# Patient Record
Sex: MALE | Race: WHITE | NOT HISPANIC OR LATINO | Employment: OTHER | URBAN - METROPOLITAN AREA
[De-identification: names, ages, dates, MRNs, and addresses within clinical notes are randomized per-mention and may not be internally consistent; named-entity substitution may affect disease eponyms.]

---

## 2018-05-08 ENCOUNTER — OFFICE VISIT CONVERTED (OUTPATIENT)
Dept: CARDIOLOGY | Facility: CLINIC | Age: 73
End: 2018-05-08
Attending: NURSE PRACTITIONER

## 2018-11-16 ENCOUNTER — OFFICE VISIT CONVERTED (OUTPATIENT)
Dept: CARDIOLOGY | Facility: CLINIC | Age: 73
End: 2018-11-16
Attending: NURSE PRACTITIONER

## 2019-03-06 ENCOUNTER — HOSPITAL ENCOUNTER (OUTPATIENT)
Dept: GENERAL RADIOLOGY | Facility: HOSPITAL | Age: 74
Discharge: HOME | End: 2019-03-06

## 2019-04-03 ENCOUNTER — HOSPITAL ENCOUNTER (OUTPATIENT)
Dept: GENERAL RADIOLOGY | Facility: HOSPITAL | Age: 74
Discharge: HOME | End: 2019-04-03

## 2019-04-04 ENCOUNTER — HOSPITAL ENCOUNTER (OUTPATIENT)
Dept: GENERAL RADIOLOGY | Facility: HOSPITAL | Age: 74
Discharge: HOME | End: 2019-04-04
Attending: PHYSICIAN ASSISTANT

## 2019-05-02 ENCOUNTER — HOSPITAL ENCOUNTER (OUTPATIENT)
Dept: CT IMAGING | Facility: HOSPITAL | Age: 74
Discharge: HOME | End: 2019-05-02

## 2019-05-02 LAB
CREAT BLD-MCNC: 1.3 MG/DL (ref 0.6–1.4)
GFR SERPLBLD BASED ON 1.73 SQ M-ARVRAT: 54 ML/MIN/{1.73_M2}

## 2019-05-21 ENCOUNTER — OFFICE VISIT CONVERTED (OUTPATIENT)
Dept: CARDIOLOGY | Facility: CLINIC | Age: 74
End: 2019-05-21
Attending: NURSE PRACTITIONER

## 2019-05-21 ENCOUNTER — CONVERSION ENCOUNTER (OUTPATIENT)
Dept: CARDIOLOGY | Facility: CLINIC | Age: 74
End: 2019-05-21
Attending: INTERNAL MEDICINE

## 2019-06-07 ENCOUNTER — OFFICE VISIT CONVERTED (OUTPATIENT)
Dept: PULMONOLOGY | Facility: CLINIC | Age: 74
End: 2019-06-07
Attending: INTERNAL MEDICINE

## 2019-07-17 ENCOUNTER — HOSPITAL ENCOUNTER (OUTPATIENT)
Dept: GENERAL RADIOLOGY | Facility: HOSPITAL | Age: 74
Discharge: HOME | End: 2019-07-17
Attending: OTOLARYNGOLOGY

## 2019-07-17 LAB
ANION GAP SERPL CALC-SCNC: 17 MMOL/L (ref 8–19)
APTT BLD: 24.8 S (ref 22.2–34.2)
BASOPHILS # BLD AUTO: 0.03 10*3/UL (ref 0–0.2)
BASOPHILS NFR BLD AUTO: 0.3 % (ref 0–3)
BUN SERPL-MCNC: 17 MG/DL (ref 5–25)
BUN/CREAT SERPL: 13 {RATIO} (ref 6–20)
CALCIUM SERPL-MCNC: 9.3 MG/DL (ref 8.7–10.4)
CHLORIDE SERPL-SCNC: 101 MMOL/L (ref 99–111)
CONV ABS IMM GRAN: 0.04 10*3/UL (ref 0–0.2)
CONV CO2: 26 MMOL/L (ref 22–32)
CONV IMMATURE GRAN: 0.4 % (ref 0–1.8)
CREAT UR-MCNC: 1.35 MG/DL (ref 0.7–1.2)
DEPRECATED RDW RBC AUTO: 43.3 FL (ref 35.1–43.9)
EOSINOPHIL # BLD AUTO: 0.6 10*3/UL (ref 0–0.7)
EOSINOPHIL # BLD AUTO: 6.3 % (ref 0–7)
ERYTHROCYTE [DISTWIDTH] IN BLOOD BY AUTOMATED COUNT: 12.9 % (ref 11.6–14.4)
GFR SERPLBLD BASED ON 1.73 SQ M-ARVRAT: 52 ML/MIN/{1.73_M2}
GLUCOSE SERPL-MCNC: 127 MG/DL (ref 70–99)
HBA1C MFR BLD: 13.7 G/DL (ref 14–18)
HCT VFR BLD AUTO: 42.1 % (ref 42–52)
INR PPP: 0.9 (ref 2–3)
LYMPHOCYTES # BLD AUTO: 2.09 10*3/UL (ref 1–5)
MCH RBC QN AUTO: 29.8 PG (ref 27–31)
MCHC RBC AUTO-ENTMCNC: 32.5 G/DL (ref 33–37)
MCV RBC AUTO: 91.5 FL (ref 80–96)
MONOCYTES # BLD AUTO: 0.66 10*3/UL (ref 0.2–1.2)
MONOCYTES NFR BLD AUTO: 7 % (ref 3–10)
NEUTROPHILS # BLD AUTO: 6.03 10*3/UL (ref 2–8)
NEUTROPHILS NFR BLD AUTO: 63.9 % (ref 30–85)
NRBC CBCN: 0 % (ref 0–0.7)
OSMOLALITY SERPL CALC.SUM OF ELEC: 293 MOSM/KG (ref 273–304)
PLATELET # BLD AUTO: 180 10*3/UL (ref 130–400)
PMV BLD AUTO: 10.8 FL (ref 9.4–12.4)
POTASSIUM SERPL-SCNC: 4.1 MMOL/L (ref 3.5–5.3)
PROTHROMBIN TIME: 9.6 S (ref 9.4–12)
RBC # BLD AUTO: 4.6 10*6/UL (ref 4.7–6.1)
SODIUM SERPL-SCNC: 140 MMOL/L (ref 135–147)
VARIANT LYMPHS NFR BLD MANUAL: 22.1 % (ref 20–45)
WBC # BLD AUTO: 9.45 10*3/UL (ref 4.8–10.8)

## 2019-07-23 ENCOUNTER — HOSPITAL ENCOUNTER (OUTPATIENT)
Dept: PERIOP | Facility: HOSPITAL | Age: 74
Setting detail: HOSPITAL OUTPATIENT SURGERY
Discharge: HOME | End: 2019-07-23
Attending: OTOLARYNGOLOGY

## 2019-07-23 LAB — GLUCOSE BLD-MCNC: 182 MG/DL (ref 70–99)

## 2019-08-07 ENCOUNTER — OFFICE VISIT CONVERTED (OUTPATIENT)
Dept: PULMONOLOGY | Facility: CLINIC | Age: 74
End: 2019-08-07
Attending: INTERNAL MEDICINE

## 2019-09-04 ENCOUNTER — HOSPITAL ENCOUNTER (OUTPATIENT)
Dept: SLEEP MEDICINE | Facility: HOSPITAL | Age: 74
Discharge: HOME | End: 2019-09-04
Attending: PSYCHIATRY & NEUROLOGY

## 2019-09-06 ENCOUNTER — OFFICE VISIT CONVERTED (OUTPATIENT)
Dept: CARDIOLOGY | Facility: CLINIC | Age: 74
End: 2019-09-06
Attending: INTERNAL MEDICINE

## 2019-12-03 ENCOUNTER — OFFICE VISIT CONVERTED (OUTPATIENT)
Dept: CARDIOLOGY | Facility: CLINIC | Age: 74
End: 2019-12-03
Attending: NURSE PRACTITIONER

## 2020-01-20 ENCOUNTER — HOSPITAL ENCOUNTER (OUTPATIENT)
Dept: GENERAL RADIOLOGY | Facility: HOSPITAL | Age: 75
Discharge: HOME | End: 2020-01-20
Attending: GENERAL PRACTICE

## 2020-02-12 ENCOUNTER — OFFICE VISIT CONVERTED (OUTPATIENT)
Dept: PULMONOLOGY | Facility: CLINIC | Age: 75
End: 2020-02-12
Attending: INTERNAL MEDICINE

## 2020-04-20 ENCOUNTER — HOSPITAL ENCOUNTER (OUTPATIENT)
Dept: OTHER | Facility: HOSPITAL | Age: 75
Discharge: HOME | End: 2020-04-20
Attending: OTOLARYNGOLOGY

## 2020-04-22 LAB
EYE OR EAR CULTURE: ABNORMAL
EYE OR EAR CULTURE: ABNORMAL

## 2020-06-12 ENCOUNTER — OFFICE VISIT CONVERTED (OUTPATIENT)
Dept: CARDIOLOGY | Facility: CLINIC | Age: 75
End: 2020-06-12
Attending: NURSE PRACTITIONER

## 2020-09-09 ENCOUNTER — HOSPITAL ENCOUNTER (OUTPATIENT)
Dept: SLEEP MEDICINE | Facility: HOSPITAL | Age: 75
Discharge: HOME | End: 2020-09-09
Attending: PSYCHIATRY & NEUROLOGY

## 2020-09-23 ENCOUNTER — HOSPITAL ENCOUNTER (OUTPATIENT)
Dept: ULTRASOUND IMAGING | Facility: HOSPITAL | Age: 75
Discharge: HOME | End: 2020-09-23
Attending: NURSE PRACTITIONER

## 2020-10-29 ENCOUNTER — HOSPITAL ENCOUNTER (OUTPATIENT)
Dept: URGENT CARE | Facility: CLINIC | Age: 75
Discharge: HOME | End: 2020-10-29

## 2021-01-07 ENCOUNTER — HOSPITAL ENCOUNTER (OUTPATIENT)
Dept: OTHER | Facility: HOSPITAL | Age: 76
Discharge: HOME | End: 2021-01-07
Attending: OTOLARYNGOLOGY

## 2021-01-11 LAB
CASPOFUNGIN SUSC ISLT: 0.25
EYE OR EAR CULTURE: ABNORMAL
FLUCONAZOLE SUSC ISLT: <=0.5
MICAFUNGIN [SUSCEPTIBILITY]: 0.5
VORICONAZOLE SUSC ISLT: <=0.12

## 2021-02-25 ENCOUNTER — HOSPITAL ENCOUNTER (OUTPATIENT)
Dept: GENERAL RADIOLOGY | Facility: HOSPITAL | Age: 76
Discharge: HOME | End: 2021-02-25
Attending: OTOLARYNGOLOGY

## 2021-02-25 ENCOUNTER — HOSPITAL ENCOUNTER (OUTPATIENT)
Dept: LAB | Facility: HOSPITAL | Age: 76
Discharge: HOME | End: 2021-02-25
Attending: NURSE PRACTITIONER

## 2021-02-25 LAB
ANION GAP SERPL CALC-SCNC: 15 MMOL/L (ref 8–19)
APPEARANCE UR: CLEAR
BASOPHILS # BLD AUTO: 0.03 10*3/UL (ref 0–0.2)
BASOPHILS NFR BLD AUTO: 0.3 % (ref 0–3)
BILIRUB UR QL: NEGATIVE
BUN SERPL-MCNC: 15 MG/DL (ref 5–25)
BUN/CREAT SERPL: 12 {RATIO} (ref 6–20)
CALCIUM SERPL-MCNC: 9.6 MG/DL (ref 8.7–10.4)
CHLORIDE SERPL-SCNC: 102 MMOL/L (ref 99–111)
COLOR UR: YELLOW
CONV ABS IMM GRAN: 0.07 10*3/UL (ref 0–0.2)
CONV CO2: 25 MMOL/L (ref 22–32)
CONV COLLECTION SOURCE (UA): NORMAL
CONV CREATININE URINE, RANDOM: 39.7 MG/DL (ref 10–300)
CONV IMMATURE GRAN: 0.6 % (ref 0–1.8)
CONV UROBILINOGEN IN URINE BY AUTOMATED TEST STRIP: 0.2 {EHRLICHU}/DL (ref 0.1–1)
CREAT UR-MCNC: 1.23 MG/DL (ref 0.7–1.2)
DEPRECATED RDW RBC AUTO: 42.9 FL (ref 35.1–43.9)
EOSINOPHIL # BLD AUTO: 0.38 10*3/UL (ref 0–0.7)
EOSINOPHIL # BLD AUTO: 3.4 % (ref 0–7)
ERYTHROCYTE [DISTWIDTH] IN BLOOD BY AUTOMATED COUNT: 13.3 % (ref 11.6–14.4)
GFR SERPLBLD BASED ON 1.73 SQ M-ARVRAT: 58 ML/MIN/{1.73_M2}
GLUCOSE SERPL-MCNC: 132 MG/DL (ref 70–99)
GLUCOSE UR QL: NEGATIVE MG/DL
HCT VFR BLD AUTO: 47 % (ref 42–52)
HGB BLD-MCNC: 15.4 G/DL (ref 14–18)
HGB UR QL STRIP: NEGATIVE
KETONES UR QL STRIP: NEGATIVE MG/DL
LEUKOCYTE ESTERASE UR QL STRIP: NEGATIVE
LYMPHOCYTES # BLD AUTO: 2.37 10*3/UL (ref 1–5)
LYMPHOCYTES NFR BLD AUTO: 21.3 % (ref 20–45)
MCH RBC QN AUTO: 29.3 PG (ref 27–31)
MCHC RBC AUTO-ENTMCNC: 32.8 G/DL (ref 33–37)
MCV RBC AUTO: 89.4 FL (ref 80–96)
MONOCYTES # BLD AUTO: 0.55 10*3/UL (ref 0.2–1.2)
MONOCYTES NFR BLD AUTO: 4.9 % (ref 3–10)
NEUTROPHILS # BLD AUTO: 7.72 10*3/UL (ref 2–8)
NEUTROPHILS NFR BLD AUTO: 69.5 % (ref 30–85)
NITRITE UR QL STRIP: NEGATIVE
NRBC CBCN: 0 % (ref 0–0.7)
OSMOLALITY SERPL CALC.SUM OF ELEC: 289 MOSM/KG (ref 273–304)
PH UR STRIP.AUTO: 7 [PH] (ref 5–8)
PHOSPHATE SERPL-MCNC: 2.8 MG/DL (ref 2.4–4.5)
PLATELET # BLD AUTO: 201 10*3/UL (ref 130–400)
PMV BLD AUTO: 11 FL (ref 9.4–12.4)
POTASSIUM SERPL-SCNC: 4.1 MMOL/L (ref 3.5–5.3)
PROT UR QL: NEGATIVE MG/DL
PROT UR-MCNC: <4 MG/DL
PTH-INTACT SERPL-MCNC: 41.1 PG/ML (ref 11.1–79.5)
RBC # BLD AUTO: 5.26 10*6/UL (ref 4.7–6.1)
SODIUM SERPL-SCNC: 138 MMOL/L (ref 135–147)
SP GR UR: 1.01 (ref 1–1.03)
WBC # BLD AUTO: 11.12 10*3/UL (ref 4.8–10.8)

## 2021-03-10 ENCOUNTER — OFFICE VISIT CONVERTED (OUTPATIENT)
Dept: PULMONOLOGY | Facility: CLINIC | Age: 76
End: 2021-03-10
Attending: SPECIALIST

## 2021-03-19 ENCOUNTER — OFFICE VISIT CONVERTED (OUTPATIENT)
Dept: CARDIOLOGY | Facility: CLINIC | Age: 76
End: 2021-03-19
Attending: NURSE PRACTITIONER

## 2021-03-19 ENCOUNTER — CONVERSION ENCOUNTER (OUTPATIENT)
Dept: CARDIOLOGY | Facility: CLINIC | Age: 76
End: 2021-03-19

## 2021-04-30 ENCOUNTER — HOSPITAL ENCOUNTER (OUTPATIENT)
Dept: GENERAL RADIOLOGY | Facility: HOSPITAL | Age: 76
Discharge: HOME | End: 2021-04-30
Attending: NURSE PRACTITIONER

## 2021-05-04 ENCOUNTER — OFFICE VISIT CONVERTED (OUTPATIENT)
Dept: PULMONOLOGY | Facility: CLINIC | Age: 76
End: 2021-05-04
Attending: INTERNAL MEDICINE

## 2021-05-13 NOTE — PROGRESS NOTES
Progress Note      Patient Name: Abdi Gomez   Patient ID: [id number]   Sex: Male   YOB: 1945    Primary Care Provider: Sarika BRAUN   Referring Provider: Sarika BRAUN    Visit Date: June 12, 2020    Provider: APARNA Decker   Location: [facility]   Location Address: [address] [place] [address], [facility]   JONNA Oliveira  [id number]   Location Phone: [phone]          Chief Complaint  · Follow-up on his coronary artery disease       History Of Present Illness  REFERRING PROVIDER: Jaimie BRAUN   Abdi Gomez is a 75 year old male who denies any chest pain or pressure. No palpitations, shortness of breath, swelling, dizziness, syncope, PND or orthopnea. Cardiac-wise he is feeling very well. He has lost about 3 pounds since his last visit. He admits he does not get any regular exercise, but he said his cholesterols are much better and diabetes is controlled. Blood pressure when he checked it this morning was 136/76, and he said it is in a good range at home, but he did not bring the readings.   PAST MEDICAL HISTORY: 1) Coronary artery disease, status post angioplasty and stent placement in 1999 (details not available); 2) Hypertension; 3) Hyperlipidemia; 4) Diabetes mellitus; 5) Chronic sinus problems.   FAMILY HISTORY: Negative for diabetes mellitus, hypertension, or heart disease.   PSYCHOSOCIAL HISTORY: Denies mood changes or depression. Denies alcohol use. Previously smoked, but quit.   CURRENT MEDICATIONS: include Famotidine 20 mg 2 tablets daily; Metformin 500 mg b.i.d.; Cetirizine 18 mg daily; Singulair 10 mg daily; Flonase; Trelegy; Ventolin; Vitamin B12 1000 mg daily; Vitamin D 2000 units daily; Olmesartan/HCTZ 40/12.5 mg daily; ASA 81 mg daily; Rosuvastatin 10 mg every other day; Ezetimibe 10 mg every other day. The dosage and frequency of the medications were reviewed with the patient.       Review of Systems  · Cardiovascular  o Denies  o :  "palpitations (fast, fluttering, or skipping beats), swelling (feet, ankles, hands), shortness of breath while walking or lying flat, chest pain or angina pectoris   · Respiratory  o Denies  o : chronic or frequent cough, asthma or wheezing      Vitals  Date Time BP Position Site L\R Cuff Size HR RR TEMP (F) WT  HT  BMI kg/m2 BSA m2 O2 Sat HC       06/12/2020 09:32 /86 Sitting    56 - R   271lbs 0oz 5'  11\" 37.8 2.48     06/12/2020 09:32 /88 Sitting                     Physical Examination  · Constitutional  o Appearance  o : Awake, alert, in no acute distress, obese male.  · Eyes  o Conjunctivae  o : Conjunctivae normal.  · Ears, Nose, Mouth and Throat  o Oral Cavity  o :   § Oral Mucosa  § : Normal.  · Neck  o Jugular Veins  o : No JVD. Good carotid upstroke. No bruits noted.  · Respiratory  o Respiratory  o : Good respiratory effort. Clear to percussion and auscultation.  · Cardiovascular  o Heart  o : PMI is normal. S1, S2 normal. No S3. No S4. Negative systolic/diastolic murmur.  o Peripheral Vascular System  o :   § Extremities  § : Good femoral and pedal pulses. No pedal edema.  · Gastrointestinal  o Abdominal Examination  o : Soft with central obesity. Nontender. No tenderness or masses felt. No hepatosplenomegaly. Abdominal aorta is not palpable. Bowel sounds heard in all four quadrants.     We are trying to obtain labs from his PCP.           Assessment     1.  Hypertension, uncertain control.  2.  Coronary artery disease with previous angioplasty and stent, without angina.  3.  Hyperlipidemia, unknown control.  4.  Myalgias from statins stable.       Plan     1.  Instructed him to do a blood pressure log, and we will adjust hypertensive medications if needed.  2.  Will follow up in 6 months with labs from his PCP.    Brook zavala/ren           This note was transcribed by Josephine Huitron.  ren/lucas  The above service was transcribed by Josephine Huitron, and I attest to the accuracy of the note.  " JF                 Electronically Signed by: Josephine Huitron-, -Author on June 17, 2020 10:07:11 AM  Electronically Co-signed by: APARNA Decker -Reviewer on June 23, 2020 01:38:47 PM

## 2021-05-14 VITALS
HEIGHT: 71 IN | SYSTOLIC BLOOD PRESSURE: 144 MMHG | WEIGHT: 284 LBS | BODY MASS INDEX: 39.76 KG/M2 | DIASTOLIC BLOOD PRESSURE: 82 MMHG | HEART RATE: 98 BPM

## 2021-05-14 NOTE — PROGRESS NOTES
Progress Note      Patient Name: Abdi Gomez   Patient ID: [id number]   Sex: Male   YOB: 1945    Primary Care Provider: Sarika BRAUN   Referring Provider: Sarika BRAUN    Visit Date: March 19, 2021    Provider: APARNA Decker   Location: [facility]   Location Address: [address] [place] [address], [address]   JONNA Oliveira  [id number]   Location Phone: [phone]          Chief Complaint     Evaluate his coronary artery disease.       History Of Present Illness  REFERRING PROVIDER: Sarika BRAUN   Abdi Gomez is a 75 year old /White male who denies any chest pain or pressure. No palpitations, shortness of breath, swelling, dizziness, syncope, PND, or orthopnea. Cardiac-wise, he is feeling very well. Home blood pressures range between 123/70 to 132/70 when he takes his medications. It is higher in the morning with the highest being 158/90 and that is before his meds. He was on a beta-blocker at one time, but he continues to have allergy shots on a weekly basis and they do not want him on a beta-blocker. He has not had a COVID vaccine yet and contemplated not getting it, but now, he has changed his mind. He is looking for a place to put his name on the list. He has gained 13 pounds since his last visit, and he admits his diet has been poor over the wintertime.   PAST MEDICAL HISTORY: 1) Coronary artery disease, status post angioplasty and stent placement in 1999 (details not available); 2) Hypertension; 3) Hyperlipidemia; 4) Diabetes mellitus; 5) Chronic sinus problems.   PSYCHOSOCIAL HISTORY: Previously smoked, but quit. Denies alcohol use.   CURRENT MEDICATIONS: Glipizide 2.5 mg b.i.d.; Trelegy daily; rosuvastatin 10 mg every other day; olmesartan 40 mg daily; hydrochlorothiazide 12.5 mg every other day; aspirin 81 mg daily; ezetimibe 10 mg every other day; fluticasone nasal spray; montelukast 10 mg daily; vitamin B12 daily; vitamin D daily; famotidine 20 mg  "b.i.d.      ALLERGIES: Keflex.       Review of Systems  · Cardiovascular  o Denies  o : palpitations (fast, fluttering, or skipping beats), swelling (feet, ankles, hands), shortness of breath while walking or lying flat, chest pain or angina pectoris   · Respiratory  o Denies  o : chronic or frequent cough      Vitals  Date Time BP Position Site L\R Cuff Size HR RR TEMP (F) WT  HT  BMI kg/m2 BSA m2 O2 Sat FR L/min FiO2 HC       03/19/2021 10:10 /82 Sitting    98 - R   284lbs 0oz 5'  11\" 39.61 2.54       03/19/2021 10:10 /80 Sitting    96 - R                   Physical Examination  · Constitutional  o Appearance  o : Awake, alert, obese male, in no acute distress.  · Eyes  o Conjunctivae  o : Conjunctivae normal.  · Ears, Nose, Mouth and Throat  o Oral Cavity  o :   § Oral Mucosa  § : Normal.  · Neck  o Jugular Veins  o : No JVD. Good carotid upstroke. No bruits noted.  · Respiratory  o Respiratory  o : Good respiratory effort. Clear to percussion and auscultation.  · Cardiovascular  o Heart  o : PMI is normal. S1, S2 normal. No S3. No S4. Negative systolic/diastolic murmur.  o Peripheral Vascular System  o :   § Extremities  § : Good femoral and pedal pulses. No pedal edema.  · Gastrointestinal  o Abdominal Examination  o : Soft with central obesity. Nontender. No tenderness or masses felt. No hepatosplenomegaly. Abdominal aorta is not palpable. Bowel sounds heard in all four quadrants.   · Labs  o Labs  o : [facility], total cholesterol 147, triglycerides 104, HDL 37, LDL up to 89.          Assessment     1.  Hypertension with degree of white coat syndrome and good readings at home.  2.  Hyperlipidemia with LDL worsening.  3.  Coronary artery disease with previous angioplasty and stent without angina.  4.  Diabetes mellitus.       Plan     1.  Counseled regarding weight loss and low-fat, low-cholesterol diet to help with his LDL.  If does not improve,       we will consider adding " Nexletol to his rosuvastatin and Zetia.  2.  We are trying to investigate what happened to the olmesartan, more for secondary prevention but also        better blood pressure control, and restart it in the future.    3.  Ideally, we would like to have a beta-blocker in view of his slightly fast heart rates, but cannot at this time        because of the allergy shots.    4.  Continue hydrochlorothiazide in view of hypertension.  5.  Follow up in 6-8 months with labs or earlier if needed.    ADDENDUM:  Patient is taking olmesartan 40 mg once a day.        APARNA Russo  JF:vm             Electronically Signed by: Radha Garcia-, Other -Author on March 31, 2021 02:19:30 PM  Electronically Co-signed by: APARNA Decker -Reviewer on April 1, 2021 09:08:42 AM

## 2021-05-15 VITALS
WEIGHT: 270 LBS | BODY MASS INDEX: 37.8 KG/M2 | DIASTOLIC BLOOD PRESSURE: 96 MMHG | SYSTOLIC BLOOD PRESSURE: 150 MMHG | HEART RATE: 46 BPM | HEIGHT: 71 IN

## 2021-05-15 VITALS
WEIGHT: 279 LBS | HEART RATE: 50 BPM | HEIGHT: 71 IN | BODY MASS INDEX: 39.06 KG/M2 | DIASTOLIC BLOOD PRESSURE: 88 MMHG | SYSTOLIC BLOOD PRESSURE: 152 MMHG

## 2021-05-15 VITALS
HEART RATE: 56 BPM | WEIGHT: 271 LBS | BODY MASS INDEX: 37.94 KG/M2 | SYSTOLIC BLOOD PRESSURE: 154 MMHG | DIASTOLIC BLOOD PRESSURE: 86 MMHG | HEIGHT: 71 IN

## 2021-05-15 VITALS
WEIGHT: 273 LBS | BODY MASS INDEX: 38.22 KG/M2 | HEIGHT: 71 IN | DIASTOLIC BLOOD PRESSURE: 80 MMHG | HEART RATE: 80 BPM | SYSTOLIC BLOOD PRESSURE: 150 MMHG

## 2021-05-16 VITALS
BODY MASS INDEX: 38.22 KG/M2 | WEIGHT: 273 LBS | SYSTOLIC BLOOD PRESSURE: 132 MMHG | DIASTOLIC BLOOD PRESSURE: 90 MMHG | HEART RATE: 80 BPM | HEIGHT: 71 IN

## 2021-05-16 VITALS
DIASTOLIC BLOOD PRESSURE: 96 MMHG | HEART RATE: 88 BPM | SYSTOLIC BLOOD PRESSURE: 160 MMHG | HEIGHT: 71 IN | WEIGHT: 274 LBS | BODY MASS INDEX: 38.36 KG/M2

## 2021-05-19 ENCOUNTER — HOSPITAL ENCOUNTER (OUTPATIENT)
Dept: SLEEP MEDICINE | Facility: HOSPITAL | Age: 76
Discharge: HOME | End: 2021-05-19
Attending: PSYCHIATRY & NEUROLOGY

## 2021-05-23 ENCOUNTER — TRANSCRIBE ORDERS (OUTPATIENT)
Dept: PULMONOLOGY | Facility: CLINIC | Age: 76
End: 2021-05-23

## 2021-05-23 DIAGNOSIS — J44.9 CHRONIC OBSTRUCTIVE PULMONARY DISEASE, UNSPECIFIED COPD TYPE (HCC): Primary | ICD-10-CM

## 2021-05-28 VITALS
WEIGHT: 265 LBS | SYSTOLIC BLOOD PRESSURE: 146 MMHG | DIASTOLIC BLOOD PRESSURE: 88 MMHG | BODY MASS INDEX: 38.84 KG/M2 | HEIGHT: 71 IN | WEIGHT: 273.12 LBS | DIASTOLIC BLOOD PRESSURE: 86 MMHG | RESPIRATION RATE: 14 BRPM | TEMPERATURE: 98.7 F | BODY MASS INDEX: 38.24 KG/M2 | HEIGHT: 71 IN | OXYGEN SATURATION: 98 % | WEIGHT: 277.43 LBS | TEMPERATURE: 98.7 F | SYSTOLIC BLOOD PRESSURE: 151 MMHG | OXYGEN SATURATION: 95 % | TEMPERATURE: 98.3 F | HEART RATE: 74 BPM | HEART RATE: 95 BPM | BODY MASS INDEX: 37.1 KG/M2 | HEART RATE: 94 BPM | DIASTOLIC BLOOD PRESSURE: 82 MMHG | RESPIRATION RATE: 14 BRPM | SYSTOLIC BLOOD PRESSURE: 176 MMHG | OXYGEN SATURATION: 95 % | RESPIRATION RATE: 14 BRPM | HEIGHT: 71 IN

## 2021-05-28 VITALS
WEIGHT: 280 LBS | TEMPERATURE: 98.6 F | HEIGHT: 71 IN | DIASTOLIC BLOOD PRESSURE: 81 MMHG | BODY MASS INDEX: 39.2 KG/M2 | SYSTOLIC BLOOD PRESSURE: 156 MMHG | HEART RATE: 92 BPM | RESPIRATION RATE: 16 BRPM | OXYGEN SATURATION: 96 %

## 2021-05-28 VITALS
HEIGHT: 71 IN | WEIGHT: 280 LBS | SYSTOLIC BLOOD PRESSURE: 150 MMHG | OXYGEN SATURATION: 99 % | TEMPERATURE: 98 F | BODY MASS INDEX: 39.2 KG/M2 | RESPIRATION RATE: 18 BRPM | HEART RATE: 85 BPM | DIASTOLIC BLOOD PRESSURE: 83 MMHG

## 2021-05-28 NOTE — PROGRESS NOTES
Patient: IMANI ARELLANO     Acct: [id number]     Report: #[id number]  UNIT #: [id number]     : 1945    Encounter Date:2019  PRIMARY CARE: JOSE CHRIS  ***Signed***  --------------------------------------------------------------------------------------------------------------------  Chief Complaint      Encounter Date      2019            Primary Care Provider            larry norman            Referring Provider            larry norman            Patient Complaint      Patient is complaining of      abnormal chest xray            VITALS      Height 5 ft 11 in / 180.34 cm      Weight 277 lbs 7 oz / 125.311473 kg      BSA 2.42 m2      BMI 38.7 kg/m2      Temperature 98.7 F / 37.06 C - Oral      Pulse 74      Respirations 14      Blood Pressure 176/82 Sitting, Right Arm      Pulse Oximetry 95%, roomair      Initial Exhaled Nitrous Oxide      Date:  2019      Exhaled Nitrous Oxide Results:  49            HPI      The patient is a very pleasant 74 year old obese  male former cigarette    smoker who quit in  for 45 pack years here for progressive dyspnea.  She has    had progressive shortness of breath for the last three years. It is worse with     exertion and relieved with rest.  He also had pneumonia in early  which made    things worse.  He has now recovered back to his baseline. He can walk about 600     feet or up one flight of steps before having to stop because of shortness of     breath. It is moderate in severity, worse with exertion and relieved with rest.     He has an intermittent cough that is dry with no sputum production or     hemoptysis. He does have some wheezing. All of his symptoms are worse when his     allergies are exacerbated.  He takes Singulair and Zyrtec and still has     occasional nasal congestion, but denies any itchy-scratchy throat, watery eyes     or nasal congestion.  He has seen Dr. Hopper who is planning on doing sinus surgery     as well as allergy testing. He did have a chest CT done recently showing severe     apical emphysema with some bibasilar pulmonary fibrosis.  PFTs were done showing    a mixed obstructive and restrictive lung defect consistent with moderate COPD,     obesity and pulmonary fibrosis.  He did previously work for [employer] and was     exposed to occupational inhalants, chemicals and fumes for about 25 years. She     is able to perform her ADLs without difficulty.  Denies any swollen glands or     lymph nodes of the head and neck.  Denies any nausea, vomiting, fevers, chills,     headaches, chest pain or hemoptysis.            I have personally reviewed the review of systems, past family, social, surgical     and medical histories and I agree with the findings.            ROS      Constitutional:  Denies: Fatigue, Fever, Weight gain, Weight loss, Chills,     Insomnia, Other      Respiratory/Breathing:  Complains of: Shortness of air, Wheezing, Cough; Denies:    Hemoptysis, Pleuritic pain, Other      Endocrine:  Denies: Polydipsia, Polyuria, Heat/cold intolerance, Diabetes, Other      Eyes:  Denies: Blurred vision, Vision Changes, Other      Ears, nose, mouth, throat:  Denies: Mouth lesions, Thrush, Throat pain,     Hoarseness, Allergies/Hay Fever, Post Nasal Drip, Headaches, Recent Head Injury,    Nose Bleeding, Neck Stiffness, Thyroid Mass, Hearing Loss, Ear Fullness, Dry     Mouth, Nasal or Sinus Pain, Dry Lips, Nasal discharge, Nasal congestion, Other      Cardiovascular:  Denies: Palpitations, Syncope, Claudication, Chest Pain, Wake     up Gasping for air, Leg Swelling, Irregular Heart Rate, Cyanosis, Dyspnea on     Exertion, Other      Gastrointestinal:  Denies: Nausea, Constipation, Diarrhea, Abdominal pain,     Vomiting, Difficulty Swallowing, Reflux/Heartburn, Dysphagia, Jaundice,     Bloating, Melena, Bloody stools, Other      Genitourinary:  Denies: Urinary frequency, Incontinence, Hematuria, Urgency,      Nocturia, Dysuria, Testicular problems, Other      Musculoskeletal:  Denies: Joint Pain, Joint Stiffness, Joint Swelling, Myalgias,    Other      Hematologic/lymphatic:  DENIES: Lymphadenopathy, Bruising, Bleeding tendencies,     Other      Neurological:  Denies: Headache, Numbness, Weakness, Seizures, Other      Psychiatric:  Denies: Anxiety, Appropriate Effect, Depression, Other      Sleep:  No: Excessive daytime sleep, Morning Headache?, Snoring, Insomnia?, Stop    breathing at sleep?, Other      Integumentary:  Denies: Rash, Dry skin, Skin Warm to Touch, Other      Immunologic/Allergic:  Denies: Latex allergy, Seasonal allergies, Asthma,     Urticaria, Eczema, Other      Immunization status:  No: Up to date            FAMILY/SOCIAL/MEDICAL HX      Surgical History:  Yes: Bowel Surgery (COLONOSCOPY WITH POLYP REMOVAL), Head     Surgery (BILAT CATARACT WITH IMPLANTS), Oral Surgery (T   (RIGHT TKR)      Diabetes - Family Hx:  Grandparent      Cancer/Type - Family Hx:  Father (lung)      Is Father Still Living?:  No      Is Mother Still Living?:  No       Family History:  Yes      Social History:  No Tobacco Use, No Alcohol Use, No Recreational Drug use      Smoking status:  Former smoker (1.5-2 ppd x 30 y quit 1999)      Medical History:  Yes: Arthritis (GENERALIZED ), Diabetes (TYPE II NIDDM,     DOESN'T CHECK OFTEN), Hemorrhoids/Rectal Prob (ACID REFLUX ), High Blood     Pressure (MED CONTROLLED), Shortness Of Breath (SLEEP APNEA ); No: Blood     Disease, Chemotherapy/Cancer, Deafness or Ringing Ears, Miscellaneous     Medical/oth      Psychiatric History      none            PREVENTION      Hx Influenza Vaccination:  Yes      Date Influenza Vaccine Given:  Nov 1, 2018      2 or More Falls Past Year?:  No      Fall Past Year with Injury?:  No      Hx Pneumococcal Vaccination:  No      Encouraged to follow-up with:  PCP regarding preventative exams.      Chart initiated by      katerin/ ma           "  ALLERGIES/MEDICATIONS      Allergies:        Coded Allergies:             CEFAZOLIN (Verified  Allergy, Unknown, HIVES, 6/7/19)           CEPHALEXIN (Verified  Allergy, Unknown, HIVES, 6/7/19)           CEPHALOSPORINS (Verified  Allergy, Unknown, HIVES, 6/7/19)           HYDROCODONE (Verified  Adverse Reaction, Unknown, \"MAKES ME MEAN\", 6/7/19)      Medications    Last Reconciled on 6/7/19 09:02 by VINICIO FIERRO MD      Fluticasone/Umeclidin/Vilanter (Trelegy Ellipta 100-62.5-25) 1 Each Blst.w.dev      1 PUFF INH RTQDAY, #1 INH         Prov: VINICIO FIERRO         6/7/19       Ezetimide (Zetia) 10 Mg Tablet      10 MG PO QDAY, #30 TAB 0 Refills         Reported         6/7/19       Metoprolol Tartrate (Metoprolol Tartrate*) 25 Mg Tablet      12.5 MG PO QDAY, #30 TAB 0 Refills         Reported         6/7/19       Aspirin EC (Aspirin EC) 81 Mg Tablet.dr      81 MG PO QDAY, #30 TAB 0 Refills         Reported         6/7/19       MDI-Albuterol (Ventolin HFA) 18 Gm Hfa.aer.ad      2 PUFFS INH Q4H PRN for SHORTNESS OF BREATH, #1 INH 0 Refills         Reported         6/7/19       Raudel-Fluticasone (Fluticasone 50 mcg) 16 Gm Spray.susp      2 PUFFS NARE EACH QDAY, #1 BOTTLE 0 Refills         Reported         6/7/19       Montelukast Sodium (Singulair*) 10 Mg Tablet      10 MG PO QDAY, #30 TAB 0 Refills         Reported         6/7/19       Cetirizine Hcl (ZyrTEC) 10 Mg Tablet      10 MG PO QDAY, #30 TAB 0 Refills         Reported         6/7/19       Atorvastatin (Atorvastatin) 20 Mg Tablet      20 MG PO HS, #30 TAB 0 Refills         Reported         1/23/17       Metformin Hcl (Metformin Hcl) 500 Mg Tablet      1000 MG PO BID, #120 TAB 0 Refills         Reported         1/23/17       Olmesartan/Hydrochlorothiazide (Benicar Hct 40/12.5 Mg) 1 Tab Tablet      1 TAB PO QDAY         Reported         11/13/13       Famotidine (Famotidine) 20 Mg Tablet      20 MG PO BID, TAB         Reported         11/13/13      Current " Medications      Current Medications Reviewed 19            EXAM      Vital Signs Reviewed.      General:  WDWN, Alert, NAD.      HEENT: PERRL, EOMI.  OP, nares clear, no sinus tenderness.      Neck: Supple, no JVD, no thyromegaly.      Lymph: No axillary, cervical, supraclavicular lymphadenopathy noted bilaterally.      Chest: Good aeration, clear to auscultation bilaterally, tympanic to percussion     bilaterally, no work of breathing noted.      CV: RRR, no MGR, pulses 2+, equal.        Abd: Obese, soft, NT, ND, +BS, no HSM.      EXT: No clubbing, no cyanosis, no edema, no joint tenderness.        Neuro:  A  Skin: No rashes or lesions.      Vtials      Vitals:             Height 5 ft 11 in / 180.34 cm           Weight 277 lbs 7 oz / 125.402549 kg           BSA 2.42 m2           BMI 38.7 kg/m2           Temperature 98.7 F / 37.06 C - Oral           Pulse 74           Respirations 14           Blood Pressure 176/82 Sitting, Right Arm           Pulse Oximetry 95%, roomair            REVIEW      Results Reviewed      PCCS Results Reviewed?:  Yes Prev Lab Results, Yes Prev Radiology Results, Yes     [facility]      Lab Results      I reviewed office notes from referring provider.  I reviewed chest CT and chest     x-rays, both from 2019. I personally reviewed PFTs from 2019.      Radiographic Results               [facility]                PACS RADIOLOGY REPORT            Patient: IMANI ARELLANO   Acct: #[id number]   Report: #[id number]            UNIT #: [id number]    DOS: 19 1008      INSURANCE:MEDICARE PART A   LOCATION:CT     : 1945            PROVIDERS      ADMITTING:     ATTENDING: CHRISTINA CARDONA      FAMILY:  CHRISTINA CARDONA   ORDERING:  CHRISTINA CARDONA         OTHER:    DICTATING:  Van Kaur MD            REQ #:[id number]   EXAM:CHW - CT CHEST with CONTRAST      REASON FOR EXAM:  R91.8      REASON FOR VISIT:   R91.8            *******Signed******         PROCEDURE:   CT CHEST W/ CONTRAST             COMPARISON:   [facility], CR, CHEST PA/AP   4/03/2019, 10:21.        [facility], CT, CHEST W/ CONTRAST, 4/06/2015, 7:39.             INDICATIONS:   abn chest xray, hx recent pneumonia             TECHNIQUE:   After obtaining the patient's consent, CT images were obtained with    non-ionic       intravenous contrast material.               PROTOCOL:     Standard imaging protocol performed                RADIATION:     DLP: 846mGy*cm          Automated exposure control was utilized to minimize radiation dose.       CONTRAST:   100cc Isovue 370 I.V.      LABS:     eGFR: 54ml/min/1.73m2             FINDINGS:          There is extensive bilateral predominantly upper lobe centrilobular emphysema.     There is mild       prominence of the axial pulmonary interstitium in the upper lobes.  There is     also mild prominence       of the subpleural pulmonary interstitium in the left upper lobe.  More     inferiorly there is       prominence of the axial and subpleural pulmonary interstitium in the lung bases     with minimal       subpleural honeycombing posteriorly in the right lower lobe and to a greater     degree posteriorly in       the region of the superior segment of the left lower lobe.  There is bilateral     hilar adenopathy as       well as mediastinal adenopathy.  There is a lymph node just to the right of the     lower portion of       the trachea measuring 2.4 x 1.5 cm in dimension which is unchanged from the     previous study.  There       are multiple bilateral hilar lymph nodes some of which measure up to 2.7 cm in     greatest dimension       and are not felt to be significantly changed.  Other smaller subcentimeter     mediastinal lymph nodes       are stable.  There are no suspicious appearing pulmonary parenchymal nodules.      There are       hypertrophic degenerative  changes of the thoracic spine.             CONCLUSION:                1. Overall, no significant change when compared to the previous CT scan of     2015.             2. Findings consistent with predominantly upper lobe emphysema.  Changes     predominantly in the lung       bases are more suggestive of chronic pulmonary fibrosis.             3. Stable mediastinal and hilar adenopathy most likely reactive or post reactive    in nature given       the appearance of the lungs.                GIANNI PADILLA MD             Electronically Signed and Approved By: GIANNI PADILLA MD on 2019 at 12:25                           Until signed, this is an unconfirmed preliminary report that may contain      errors and is subject to change.                                              SCHJ1:      D:19 1225      PFT Results      Patient: ABDI ARELLANOA   Acct: #[id number]   Report: #[id number]            UNIT #: [id number]    DOS:       LOCATION:CT     : 1945            PROVIDERS      ADMITTING:     FAMILY:  CHRISTINA CARDONA         OTHER:       DICTATING:  VINICIO FIERRO MD            REASON FOR VISIT:  R91.8            *******Signed******                                    [facility]                          Health Information Management Services                            [place], [place]  [address]               __________________________________________________________________________             Patient Name:                   Attending Physician:      Abdi Arellano             Patient Visit # MR #            Admit Date  Disch Date     Location      [id number]    [id number]      2019                 CT- -             Date of Birth      1945      __________________________________________________________________________      08 - DIAGNOSTIC REPORT             PULMONARY FUNCTION TEST             DATE OF STUDY:      2019.              SPIROMETRY:      Moderate airflow obstruction present.      Post bronchodilator FEV1 is 2.24 L, 68% predicted.      No bronchodilator response present.      Flow volume loop shows mixed defect.             LUNG VOLUMES:      Moderate restrictive lung defect is present.      Total lung capacity is 4.96 L, 68% predicted.             DIFFUSION CAPACITY:      Diffusion capacity is moderately reduced at 49% predicted.             OVERALL IMPRESSION:      1.  Moderate mixed obstructive and restrictive lung defect without          bronchodilator response present.      2.  Diffusion capacity is moderately reduced.      3.  Could be seen in interstitial lung disease overlapping with emphysema or          potentially pulmonary edema.  Please clinically correlate.             To be electronically signed in [email or web address]      [id number] VINICIO FIERRO M.D.             AM:rt      D:  05/06/2019 00:10      T:  05/06/2019 08:52      #[id number]             Until signed, this is an unconfirmed preliminary report that may contain      errors and is subject to change.                   Until signed, this is an unconfirmed preliminary report that may contain      errors and is subject to change.                     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            FILOMENA (obstructive sleep apnea) - G47.33            Notes      New Medications      * CETIRIZINE HCL (ZyrTEC) 10 MG TABLET: 10 MG PO QDAY #30      * MONTELUKAST SODIUM (Singulair*) 10 MG TABLET: 10 MG PO QDAY #30      *  Raudel-Fluticasone (Fluticasone 50 mcg) 16 GM SPRAY.SUSP: 2 PUFFS NARE EACH QDAY       #1      * MDI-Albuterol (Ventolin HFA) 18 GM HFA.AER.AD: 2 PUFFS INH Q4H PRN SHORTNESS       OF BREATH #1      * Aspirin EC 81 MG TABLET.DR: 81 MG PO QDAY #30      * Metoprolol Tartrate 25 MG TABLET: 12.5 MG PO QDAY #30      * Ezetimide (Zetia) 10 MG TABLET: 10 MG PO QDAY #30      * Fluticasone/Umeclidin/Vilanter (Trelegy Ellipta 100-62.5-25) 1 EACH       BLST.W.DEV: 1 PUFF INH RTQDAY #1      Discontinued Medications      * oxyCODONE-Acetaminophen 5-325 MG 1 EACH TABLET: 1-2 TAB PO Q4-6H PRN PAIN #30      * oxyCODONE-Acetaminophen 5-325 Mg 1 EACH TABLET: 1-2 TAB PO Q4-6H PRN PAIN #60      * Azithromycin Z-Tommy (Zithromax Z-Tommy) 250 MG TABLET: 250 MG PO ASDIR #1         Instructions: Take 500 mg (two tablets) by mouth the first day, then 250 mg        (one tablet) daily until all taken.      * Promethazine Hcl (Phenergan*) 25 MG SUPP.RECT: 25 MG RECTAL Q4-6H PRN NAUSEA       AND/OR VOMITING #2      New Diagnostics      * Alpha 1 Antitrypsin , Month         Dx: Emphysema lung - J43.9      New Office Procedures      * Prevnar-13, As Soon As Possible         Pneumoc 13-Alejandra Conj-Dip CRm/Pf (Prevnar 13 Syringe) 0.5 ML SYRINGE: 0.5        MILLILITER INTRAMUSCULARLY Qty 1 SYRINGE         Dx: Emphysema lung - J43.9      IMPRESSION:      1.  Dyspnea on exertion.      2. Cough.      3.  Wheeze.      4.  Seasonal allergies, well-controlled.      5. Chronic sinusitis.      6. Obesity, BMI 30.7.      7. Moderate COPD with FEV1 68% of predicted.      8.  Restrictive lung defect secondary to chronic pulmonary fibrosis and obesity.      9. Chronic pulmonary fibrosis.               PLAN:      1.  I performed exhale nitric oxide testing in the office today.  Level was 49     indicative of a moderate to high degree of eosinophilic airway inflammation.      2. Continue Trelegy as patient had significant benefit with this.  Trelegy one     puff daily  recommended. Inhaler education provided today.      3.  PFTs and chest CT are consistent with combined pulmonary fibrosis with     emphysema.  Luckily the patient has stopped smoking.      4. We will do six minute walk test in the office today.      5. We will check alpha 1 antitrypsin level and genotype.      6. Not a candidate for lung cancer screening.      7. Continue Singulair and Zyrtec and agree with allergy testing from Dr. Hopper.       8.  Agree with evaluation for possible sinus surgery by Dr. Hopper.      9. Four minutes were spent today doing diet and exercise counseling.      Recommended 30 minutes of daily exercise as well as an 1800 calorie a day low     fat diet.      10. He is up-to-date with flu vaccine.  We will give Prevnar today and Pneumovax    one year.      11. Follow up with me in 2-3 months.            Patient Education      ACO BMI High above 25:  Counseling Given, Encouraged weight loss, Encourage     dietary changes      Patient Education Provided:  COPD, How to use an Inhaler                 Disclaimer: Converted document may not contain table formatting or lab diagrams. Please see [employer] for the authenticated document.

## 2021-05-28 NOTE — PROGRESS NOTES
Patient: IMANI ARELLANO     Acct: [id number]     Report: #[id number]  UNIT #: [id number]     : 1945    Encounter Date:03/10/2021  PRIMARY CARE: JOSE CHRIS  ***Signed***  --------------------------------------------------------------------------------------------------------------------  Chief Complaint      Encounter Date      Mar 10, 2021            Primary Care Provider      JOSE CHRIS            Referring Provider      JOSE CHRIS            Patient Complaint      Patient is complaining of      PT here for 1 year F/U Med refill. Hypertension, COPD, Emphysema, Pulmonary     Fibrosis, Dyspnea, FILOMENA            VITALS      Height 5 ft 11 in / 180.34 cm      Weight 280 lbs  / 127.777896 kg      BSA 2.43 m2      BMI 39.1 kg/m2      Temperature 98.6 F / 37 C - Temporal      Pulse 92      Respirations 16      Blood Pressure 156/81 Sitting, Left Arm      Pulse Oximetry 96%, room air      Initial Exhaled Nitrous Oxide      Date:  2019            HPI      PT here for 1 year F/U Med refill. Hypertension, COPD, Emphysema, Pulmonary     Fibrosis, Dyspnea, FILOMENA       CT scan of the chest report:      FINDINGS:          There is extensive bilateral predominantly upper lobe centrilobular emphysema.     There is mild       prominence of the axial pulmonary interstitium in the upper lobes.  There is     also mild prominence       of the subpleural pulmonary interstitium in the left upper lobe.  More     inferiorly there is       prominence of the axial and subpleural pulmonary interstitium in the lung bases     with minimal       subpleural honeycombing posteriorly in the right lower lobe and to a greater     degree posteriorly in       the region of the superior segment of the left lower lobe.  There is bilateral     hilar adenopathy as       well as mediastinal adenopathy.  There is a lymph node just to the right of the     lower portion of       the trachea measuring 2.4 x 1.5 cm in dimension  which is unchanged from the     previous study.  There       are multiple bilateral hilar lymph nodes some of which measure up to 2.7 cm in     greatest dimension       and are not felt to be significantly changed.  Other smaller subcentimeter     mediastinal lymph nodes       are stable.  There are no suspicious appearing pulmonary parenchymal nodules.      There are       hypertrophic degenerative changes of the thoracic spine.       CONCLUSION:          1. Overall, no significant change when compared to the previous CT scan of     4/6/2015.       2. Findings consistent with predominantly upper lobe emphysema.  Changes     predominantly in the lung       bases are more suggestive of chronic pulmonary fibrosis.       3. Stable mediastinal and hilar adenopathy most likely reactive or post reactive    in nature given the appearance of the lungs.                 GIANNI PADILLA MD             Electronically Signed and Approved By: GIANNI PADILLA MD on 5/02/2019 at 12:25             ========    ================================================================================      ======================              PULMONARY FUNCTION TEST             DATE OF STUDY:      05/02/2019.             SPIROMETRY:      Moderate airflow obstruction present.      Post bronchodilator FEV1 is 2.24 L, 68% predicted.      No bronchodilator response present.      Flow volume loop shows mixed defect.             LUNG VOLUMES:      Moderate restrictive lung defect is present.      Total lung capacity is 4.96 L, 68% predicted.             DIFFUSION CAPACITY:      Diffusion capacity is moderately reduced at 49% predicted.             OVERALL IMPRESSION:      1.  Moderate mixed obstructive and restrictive lung defect without          bronchodilator response present.      2.  Diffusion capacity is moderately reduced.      3.  Could be seen in interstitial lung disease overlapping with emphysema or          potentially pulmonary edema.   Please clinically correlate.       To be electronically signed in [facility]      [id number] VINICIO FIERRO M.D.       AM:rt      D:  05/06/2019 00:10      T:  05/06/2019 08:52      Patient is very happy with the present medical therapy and not having any     problem. He had a Ramiro done in 2019 which is 36. He is on the CPAP since 2013     and follows with the appropriate physician. He is a retired      no pets. And a former smoker. Denies any fever chills sweating or any exposure     to anybody who is sick around him. No recent travel history.      Patient present medical management, family history, review of the systems and     social history is reviewed and as documented            ROS      Constitutional:  Denies: Fatigue, Fever, Weight gain, Weight loss, Chills,     Insomnia, Other      Respiratory/Breathing:  Denies: Shortness of air, Wheezing, Cough, Hemoptysis,     Pleuritic pain, Other      Endocrine:  Denies: Polydipsia, Polyuria, Heat/cold intolerance, Diabetes, Other      Eyes:  Denies: Blurred vision, Vision Changes, Other      Ears, nose, mouth, throat:  Denies: Congestion, Dysphagia, Hearing Changes, Nose    Bleeding, Nasal Discharge, Throat pain, Tinnitus, Other      Cardiovascular:  Denies: Chest Pain, Exertional dyspnea, Peripheral Edema,     Palpitations, Syncope, Wake up Gasping for air, Orthopnea, Tachycardia, Other      Gastrointestinal:  Denies: Abdominal pain/cramping, Bloody stools, Constipation,    Diarrhea, Melena, Nausea, Vomiting, Other      Genitourinary:  Denies: Dysuria, Urinary frequency, Incontinence, Hematuria,     Urgency, Other      Musculoskeletal:  Denies: Joint Pain, Joint Stiffness, Joint Swelling, Myalgias,    Other      Hematologic/lymphatic:  DENIES: Lymphadenopathy, Bruising, Bleeding tendencies,     Other      Neurologic:  Denies: Headache, Numbness, Weakness, Seizures, Other      Psychiatric:  Denies: Anxiety, Appropriate Effect, Depression, Other      Sleep:  " No: Excessive daytime sleep, Morning Headache?, Snoring, Insomnia?, Stop    breathing at sleep?, Other      Integumentary:  Denies: Rash, Dry skin, Skin Warm to Touch, Other            FAMILY/SOCIAL/MEDICAL HX      Surgical History:  Yes: Bowel Surgery (COLONOSCOPY WITH POLYP REMOVAL), Head     Surgery (BILAT CATARACT WITH IMPLANTS/ sinus), Oral Surgery (T   Surgery (RIGHT TKR)      Diabetes - Family Hx:  Grandparent      Cancer/Type - Family Hx:  Father      Smoking status:  Former smoker (1.5-2ppd x30 yrs quit 1999)      Medical History:  Yes: Arthritis (GENERALIZED ), Diabetes (TYPE II NIDDM, BG     RUNS AROUND 130-136 IN AM), Hemorrhoids/Rectal Prob (ACID REFLUX ), High Blood     Pressure (ON MEDICATIONS ); No: Blood Disease, Chemotherapy/Cancer, Deafness or     Ringing Ears, Shortness Of Breath, Sinus Trouble, Miscellaneous Medical/oth      Psychiatric History      None            PREVENTION      Hx Influenza Vaccination:  No      Date Influenza Vaccine Given:  Nov 1, 2019      Influenza Vaccine Declined:  No      2 or More Falls in Past Year?:  No      Fall Past Year with Injury?:  No      Hx Pneumococcal Vaccination:  Yes      Encouraged to follow-up with:  PCP regarding preventative exams.      Chart initiated by      Rosa Mora MA            ALLERGIES/MEDICATIONS      Allergies:        Coded Allergies:             CEFAZOLIN (Verified  Allergy, Unknown, HIVES, 3/10/21)           CEPHALEXIN (Verified  Allergy, Unknown, HIVES, 3/10/21)           CEPHALOSPORINS (Verified  Allergy, Unknown, HIVES, 3/10/21)           HYDROCODONE (Verified  Adverse Reaction, Unknown, \"MAKES ME MEAN\", 3/10/21)                  PT REPORTED HE CAN TAKE TYLENOL      Medications    Last Reconciled on 2/12/20 09:36 by VINICIO FIERRO MD      Aspirin Chew (Aspirin Baby) 81 Mg Tab.chew      81 MG PO QDAY, #30 TAB.CHEW 0 Refills         Reported         3/10/21       Hctz (hydroCHLOROthiazide) 12.5 Mg Capsule      12.5 MG PO QDAY, " #30 CAP 0 Refills         Reported         3/10/21       MDI-Albuterol (Ventolin HFA) 18 Gm Hfa.aer.ad      2 PUFFS INH Q6H PRN for SHORTNESS OF BREATH, #1 MDI 0 Refills         Reported         3/10/21       Famotidine (Famotidine) 20 Mg Tablet      20 MG PO BID for 30 Days, #60 TAB 0 Refills         Reported         3/10/21       Montelukast Sodium (Montelukast*) 10 Mg Tablet      10 MG PO QDAY, TAB         Reported         3/10/21       Ezetimibe (Zetia) 10 Mg Tablet      10 MG PO QDAY, #30 TAB 0 Refills         Reported         3/10/21       Raudel-Fluticasone (Fluticasone 50 mcg) 16 Gm Spray.susp      1 PUFFS NARE EACH QDAY, #16 GM 0 Refills         Reported         3/10/21       Fluticasone/Umeclidin/Vilanter (Trelegy Ellipta 100-62.5-25) 1 Each Blst.w.dev      1 PUFF INH QDAY for 30 Days, #1 INH         Prov: VINICIO FIERRO         3/3/21       Ergocalciferol (Vitamin D2) 50,000 Unit Capsule      85701 UNITS PO Q7D, #4 CAP         Reported         10/29/20       Cyanocobalamin (Vitamin B-12*) 500 Mcg Tablet      500 MCG PO QDAY, TAB         Reported         10/29/20       glipiZIDE (glipiZIDE) 10 Mg Tablet      10 MG PO QDAY, #30 TAB 0 Refills         Reported         10/29/20       Rosuvastatin Calcium (Crestor*) 10 Mg Tablet      10 MG PO HS, #30 TAB 0 Refills         Reported         7/18/19       Olmesartan/Hydrochlorothiazide (Benicar Hct 40/12.5 Mg) 1 Tab Tablet      1 TAB PO QDAY         Reported         11/13/13      Current Medications      Current Medications Reviewed 3/10/21            EXAM      Vitals      Vitals:             Height 5 ft 11 in / 180.34 cm           Weight 280 lbs  / 127.361640 kg           BSA 2.43 m2           BMI 39.1 kg/m2           Temperature 98.6 F / 37 C - Temporal           Pulse 92           Respirations 16           Blood Pressure 156/81 Sitting, Left Arm           Pulse Oximetry 96%, room air            Constitutional      General appearance:  Comfortable            Eyes       Conjunctiva:  Bilateral: Normal            Neck      JVP:  Normal      Trachea:  Midline            Respiratory      Respiratory effort:  normal      Auscultation:  Bilateral: Normal            Cardiovascular      Rhythm:  regular      Heart sounds:  NORMAL: S1, S2            Gastrointestinal      Abdomen description:  Normal      Hepatosplenomegaly:  none      Mass:  None            Skin      General color:  Normal            Lymphatic      Bilateral: None            Psychiatric      Normal      Alert and Oriented x3            Assessment      Notes      Present medical history, medication is reviewed and discussed with the patient.     Patient also had a history of obstructive sleep apnea and uses CPAP.      Will renew the present medical therapy.      We will get the spirometry and follow-up in 1 year with the primary team     physician Dr. Jensen.      New Medications      * Raudel-Fluticasone (Fluticasone 50 mcg) 16 GM SPRAY.SUSP: 1 PUFFS NARE EACH QDAY       #16      * Ezetimibe (Zetia) 10 MG TABLET: 10 MG PO QDAY #30      * MONTELUKAST SODIUM (Montelukast*) 10 MG TABLET: 10 MG PO QDAY      * Famotidine 20 MG TABLET: 20 MG PO BID 30 Days #60      * MDI-Albuterol (Ventolin HFA) 18 GM HFA.AER.AD: 2 PUFFS INH Q6H PRN SHORTNESS       OF BREATH #1      * HCTZ (hydroCHLOROthiazide) 12.5 MG CAPSULE: 12.5 MG PO QDAY #30      * Aspirin Chew (Aspirin Baby) 81 MG TAB.CHEW: 81 MG PO QDAY #30      Renewed Medications      * Fluticasone/Umeclidin/Vilanter (Trelegy Ellipta 100-62.5-25) 1 EACH       BLST.W.DEV:         1 PUFF INH QDAY 30 Days #1      New Diagnostics      * PFT-Comp, PrePost,DLCO,BodyBox         Dx: COPD (chronic obstructive pulmonary disease) - J44.9            Electronically signed by NICKOLAS JOHNS  03/11/2021 14:18       Disclaimer: Converted document may not contain table formatting or lab diagrams. Please see [employer] for the authenticated document.

## 2021-05-28 NOTE — PROGRESS NOTES
Patient: IMANI ARELLANO     Acct: [id number]     Report: #[id number]  UNIT #: [id number]     : 1945    Encounter Date:2021  PRIMARY CARE: JOSE CHRIS  ***Signed***  --------------------------------------------------------------------------------------------------------------------  Chief Complaint      Encounter Date      May 4, 2021            Primary Care Provider      JOSE CHRIS            Referring Provider      JOSE CHRIS            Patient Complaint      Patient is complaining of      PT here for F/U, FILOMENA, Emphysema, Pulmonary Fibrosis, COPD            VITALS      Height 5 ft 11 in / 180.34 cm      Weight 280 lbs  / 127.049417 kg      BSA 2.43 m2      BMI 39.1 kg/m2      Temperature 98 F / 36.67 C - Tympanic      Pulse 85      Respirations 18      Blood Pressure 150/83 Sitting, Right Arm      Pulse Oximetry 99%, room air      Initial Exhaled Nitrous Oxide      Date:  2019            HPI      The patient is a 76 year old  male with chronic obstructive pulmonary     disease here for follow up today. He is doing well on trelegy ellipta inhaler.     He had a chronic obstructive pulmonary disease exacerbation last week and had     unchanged xrays and is currently on a Z-pack. Rescue inhaler use is about once a    day. He gets short of breath walking about 700 feet. Dyspnea is moderate in     severity, worse with exertion, improved with rest. He denies any coughing,     wheezing, headaches and hemoptysis, chest pain or weight loss. He is able to     perform his ADL's without difficulty and denies any swollen glands in his lymph     nodes, head or neck.            I personally reviewed Review of Systems, family, social, surgical and medical     history and agree with their findings.            ROS      Constitutional:  Complains of: Fatigue; Denies: Fever, Weight gain, Weight loss,    Chills, Insomnia, Other      Respiratory/Breathing:  Complains of: Shortness of  air (with ambulation);     Denies: Wheezing, Cough, Hemoptysis, Pleuritic pain, Other      Endocrine:  Denies: Polydipsia, Polyuria, Heat/cold intolerance, Diabetes, Other      Eyes:  Denies: Blurred vision, Vision Changes, Other      Ears, nose, mouth, throat:  Denies: Congestion, Dysphagia, Hearing Changes, Nose    Bleeding, Nasal Discharge, Throat pain, Tinnitus, Other      Cardiovascular:  Denies: Chest Pain, Exertional dyspnea, Peripheral Edema,     Palpitations, Syncope, Wake up Gasping for air, Orthopnea, Tachycardia, Other      Gastrointestinal:  Denies: Abdominal pain/cramping, Bloody stools, Constipation,    Diarrhea, Melena, Nausea, Vomiting, Other      Genitourinary:  Denies: Dysuria, Urinary frequency, Incontinence, Hematuria,     Urgency, Other      Musculoskeletal:  Denies: Joint Pain, Joint Stiffness, Joint Swelling, Myalgias,    Other      Hematologic/lymphatic:  DENIES: Lymphadenopathy, Bruising, Bleeding tendencies,     Other      Neurologic:  Denies: Headache, Numbness, Weakness, Seizures, Other      Psychiatric:  Denies: Anxiety, Appropriate Effect, Depression, Other      Sleep:  No: Excessive daytime sleep, Morning Headache?, Snoring, Insomnia?, Stop    breathing at sleep?, Other      Integumentary:  Denies: Rash, Dry skin, Skin Warm to Touch, Other            FAMILY/SOCIAL/MEDICAL HX      Surgical History:  Yes: Bowel Surgery (COLONOSCOPY WITH POLYP REMOVAL), Head     Surgery (BILAT CATARACT WITH IMPLANTS/ sinus), Oral Surgery (T   Surgery (RIGHT TKR)      Diabetes - Family Hx:  Grandparent      Cancer/Type - Family Hx:  Father      Is Father Still Living?:  No      Is Mother Still Living?:  No      Social History:  No Tobacco Use, No Alcohol Use, No Recreational Drug use      Smoking status:  Former smoker (1.5-2ppd x30 yrs quit 1999)      Anticoagulation Therapy:  Yes (aspirin)      Antibiotic Prophylaxis:  Yes      Medical History:  Yes: Arthritis (GENERALIZED ), Diabetes (TYPE II NIDDM, BG  "    RUNS AROUND 130-136 IN AM), Hemorrhoids/Rectal Prob (ACID REFLUX ), High Blood     Pressure (ON MEDICATIONS ); No: Blood Disease, Chemotherapy/Cancer, Deafness or     Ringing Ears, Shortness Of Breath, Sinus Trouble, Miscellaneous Medical/oth      Psychiatric History      None            PREVENTION      Hx Influenza Vaccination:  No      Date Influenza Vaccine Given:  Nov 1, 2019      Influenza Vaccine Declined:  No      2 or More Falls in Past Year?:  No      Fall Past Year with Injury?:  No      Hx Pneumococcal Vaccination:  Yes      Encouraged to follow-up with:  PCP regarding preventative exams.      Chart initiated by      Ariane Borja CMA            ALLERGIES/MEDICATIONS      Allergies:        Coded Allergies:             CEFAZOLIN (Verified  Allergy, Unknown, HIVES, 5/4/21)           CEPHALEXIN (Verified  Allergy, Unknown, HIVES, 5/4/21)           CEPHALOSPORINS (Verified  Allergy, Unknown, HIVES, 5/4/21)           HYDROCODONE (Verified  Adverse Reaction, Unknown, \"MAKES ME MEAN\", 5/4/21)                  PT REPORTED HE CAN TAKE TYLENOL      Medications    Last Reconciled on 5/4/21 12:17 by VINICIO FIERRO MD      Fluticasone/Umeclidin/Vilanter (Trelegy Ellipta 100-62.5-25) 1 Each Blst.w.dev      1 PUFF INH QDAY for 30 Days, #1 INH 11 Refills         Prov: VINICIO FIERRO         5/4/21       Aspirin Chew (Aspirin Baby) 81 Mg Tab.chew      81 MG PO QDAY, #30 TAB.CHEW 0 Refills         Reported         3/10/21       Hctz (hydroCHLOROthiazide) 12.5 Mg Capsule      12.5 MG PO QDAY, #30 CAP 0 Refills         Reported         3/10/21       MDI-Albuterol (Ventolin HFA) 18 Gm Hfa.aer.ad      2 PUFFS INH Q6H PRN for SHORTNESS OF BREATH, #1 MDI 0 Refills         Reported         3/10/21       Famotidine (Famotidine) 20 Mg Tablet      20 MG PO BID for 30 Days, #60 TAB 0 Refills         Reported         3/10/21       Montelukast Sodium (Montelukast*) 10 Mg Tablet      10 MG PO QDAY, TAB         Reported         3/10/21 "       Ezetimibe (Zetia) 10 Mg Tablet      10 MG PO QDAY, #30 TAB 0 Refills         Reported         3/10/21       Raudel-Fluticasone (Fluticasone 50 mcg) 16 Gm Spray.susp      1 PUFFS NARE EACH QDAY, #16 GM 0 Refills         Reported         3/10/21       Ergocalciferol (Vitamin D2) 50,000 Unit Capsule      18458 UNITS PO Q7D, #4 CAP         Reported         10/29/20       Cyanocobalamin (Vitamin B-12*) 500 Mcg Tablet      500 MCG PO QDAY, TAB         Reported         10/29/20       glipiZIDE (glipiZIDE) 10 Mg Tablet      10 MG PO QDAY, #30 TAB 0 Refills         Reported         10/29/20       Rosuvastatin Calcium (Crestor*) 10 Mg Tablet      10 MG PO HS, #30 TAB 0 Refills         Reported         7/18/19       Olmesartan/Hydrochlorothiazide (Benicar Hct 40/12.5 Mg) 1 Tab Tablet      1 TAB PO QDAY         Reported         11/13/13      Current Medications      Current Medications Reviewed 5/4/21            EXAM      Vital Signs Reviewed      Gen: WDWN, Alert, NAD.        HEENT:  PERRL, EOMI.  OP, nares clear, no sinus tenderness.      Chest:  Good aeration, clear to auscultation bilaterally, tympanic to percussion    bilaterally, no work of breathing noted.      CV:  RRR, no MGR, pulses 2+, equal.      Abd:  Soft, NT, ND, + BS, no HSM. Obese.       EXT:  No clubbing, no cyanosis, no edema.       Neuro:  A  Skin: No rashes or lesions.      Vitals      Vitals:             Height 5 ft 11 in / 180.34 cm           Weight 280 lbs  / 127.432881 kg           BSA 2.43 m2           BMI 39.1 kg/m2           Temperature 98 F / 36.67 C - Tympanic           Pulse 85           Respirations 18           Blood Pressure 150/83 Sitting, Right Arm           Pulse Oximetry 99%, room air            REVIEW      Results Reviewed      PCCS Results Reviewed?:  Yes Prev Radiology Results, Yes Previous Mecial Records      Lab Results      I personally reviewed Dr. Coronado's last office visit notes.      Radiographic Results                [facility] Diagnostic Img                PACS RADIOLOGY REPORT            Patient: IMANI ARELLANO   Acct: #[id number]   Report: #[id number]            UNIT #: [id number]    DOS: 21 1105      INSURANCE:MEDICARE PART A   LOCATION:[facility]     : 1945            PROVIDERS      ADMITTING:     ATTENDING: JOSE CHRIS      FAMILY:  JOSE CHRIS   ORDERING:  JOSE CHRIS         OTHER: VINICIO FIERRO   DICTATING:  SABRINA SANDOVAL MD            REQ #:[id number]   EXAM:CXR2 - CHEST 2V AP PA LAT      REASON FOR EXAM:        REASON FOR VISIT:  DYSPNEA            *******Signed******         PROCEDURE:   CHEST AP/PA AND LATERAL             [facility]ST [facility]/AP   11:18.             INDICATIONS:   SHORTNESS OF BREATH FOR 2 MONTHS, COUGH, COPD, ASTHMA             FINDINGS:         Heart size within normal limits.  No dense consolidation.  Interstitial     coarsening is similar.  No       pleural fluid or pneumothorax.             CONCLUSION:   No acute change from 2020.             Mild interstitial coarsening is similar to the previous study and favored to     reflect chronic       changes.              SABRINA SANDOVAL MD             Electronically Signed and Approved By: SABRINA SANDOVAL MD on 2021 at 11:55                               Until signed, this is an unconfirmed preliminary report that may contain      errors and is subject to change.                                              KARANER:      D:21 1155            Assessment      Notes      Renewed Medications      * Fluticasone/Umeclidin/Vilanter (Trelegy Ellipta 100-62.5-25) 1 EACH       BLST.W.DEV: 1 PUFF INH QDAY 30 Days #1      ASSESSMENT:      1. Chronic dyspnea at baseline.       2. Acute exacerbation of chronic obstructive pulmonary disease clinically     improved with azithromycin.       3. Pulmonary fibrosis with emphysema. FEV1 61% and doing well on  trelegy ellipta    inhaler. Chronic obstructive pulmonary disease assessment test score is now     improved to 5 today signifying great control of underlying disease on current     therapies.       4. Known obesity with BMI 39.1.            PLAN:      1. Finish Z-pack.       2. Continue trelegy ellipta inhaler with albuterol as needed.       3. Pulmonary fibrosis due to years of occupational exposures, no indication for     anti-fibrotics, it has been stable for years.       4. Not eligible for lung cancer screening.       5. Continue Singulair, Zyrtec and allergy immunotherapy.       6.  I spent 4 minutes discussing diet and exercise counseling. I recommend 30     minutes of daily exercise as well as 1800 calorie low fat diet.      7. Up to date with  flu, Prevnar and Pneumovax and COVID-19 vaccine.       8. See me annually.            Patient Education      ACO BMI High above 25:  Counseling Given, Encouraged weight loss, Encourage     dietary changes            Electronically signed by VINICIO FIERRO  05/05/2021 16:28       Disclaimer: Converted document may not contain table formatting or lab diagrams. Please see [employer] for the authenticated document.

## 2021-05-28 NOTE — PROGRESS NOTES
Patient: IMANI ARELLANO     Acct: [id number]     Report: #[id number]  UNIT #: [id number]     : 1945    Encounter Date:2019  PRIMARY CARE: JOSE CHRIS  ***Signed***  --------------------------------------------------------------------------------------------------------------------  Chief Complaint      Encounter Date      Aug 7, 2019            Primary Care Provider            larry norman            Referring Provider            larry norman            Patient Complaint      Patient is complaining of      f/u emphysema            VITALS      Height 5 ft 11 in / 180.34 cm      Weight 273 lbs 2 oz / 123.772167 kg      BSA 2.41 m2      BMI 38.1 kg/m2      Temperature 98.3 F / 36.83 C - Oral      Pulse 95      Respirations 14      Blood Pressure 151/86 Sitting, Right Arm      Pulse Oximetry 95%, roomair      Initial Exhaled Nitrous Oxide      Date:  2019      Exhaled Nitrous Oxide Results:  49            Repeated Exhaled Nitrous Oxide      Date:  Aug 7, 2019      Repeated Nitrous Oxide Results:  36            HPI      The patient is a very pleasant 74 year old obese  male former cigarette    smoker here for follow up.  Since last visit, Dr. Hopper has done his sinus surgery    and he actually feels that this helped his breathlessness and congestion some.      He can walk about 1000 feet before having to stop because of shortness of     breath.  He can go up two flights of steps without stopping.  It is mild to     moderate in severity, worse with exertion, relieved with rest. He denies any     coughing, wheezing, headache, chest pain or hemoptysis.  He is on Singulair and     Zyrtec.  He denies any recent itchy-scratchy throat, watery eyes or nasal     congestion. He is on Trelegy for his COPD and does have some mild atelectasis     and some possible pulmonary fibrosis at the bases.  He has a history of     occupational exposure working at [employer], was exposed to  occupational     inhalants  chemicals and fumes for over 30 years which explains the pulmonary     fibrosis.  He is able to perform his ADLs without difficulty.  He denies any     swollen glands or lymph nodes of the head and neck.            I have personally reviewed the review of systems, past family, social, surgical     and medical histories and I agree with the findings.            ROS      Constitutional:  Denies: Fatigue, Fever, Weight gain, Weight loss, Chills,     Insomnia, Other      Respiratory/Breathing:  Complains of: Shortness of air; Denies: Wheezing, Cough,    Hemoptysis, Pleuritic pain, Other      Endocrine:  Denies: Polydipsia, Polyuria, Heat/cold intolerance, Diabetes, Other      Eyes:  Denies: Blurred vision, Vision Changes, Other      Ears, nose, mouth, throat:  Denies: Mouth lesions, Thrush, Throat pain,     Hoarseness, Allergies/Hay Fever, Post Nasal Drip, Headaches, Recent Head Injury,    Nose Bleeding, Neck Stiffness, Thyroid Mass, Hearing Loss, Ear Fullness, Dry     Mouth, Nasal or Sinus Pain, Dry Lips, Nasal discharge, Nasal congestion, Other      Cardiovascular:  Denies: Palpitations, Syncope, Claudication, Chest Pain, Wake     up Gasping for air, Leg Swelling, Irregular Heart Rate, Cyanosis, Dyspnea on     Exertion, Other      Gastrointestinal:  Denies: Nausea, Constipation, Diarrhea, Abdominal pain,     Vomiting, Difficulty Swallowing, Reflux/Heartburn, Dysphagia, Jaundice,     Bloating, Melena, Bloody stools, Other      Genitourinary:  Denies: Urinary frequency, Incontinence, Hematuria, Urgency,     Nocturia, Dysuria, Testicular problems, Other      Hematologic/lymphatic:  DENIES: Lymphadenopathy, Bruising, Bleeding tendencies,     Other      Neurological:  Denies: Headache, Numbness, Weakness, Seizures, Other      Psychiatric:  Denies: Anxiety, Appropriate Effect, Depression, Other      Sleep:  No: Excessive daytime sleep, Morning Headache?, Snoring, Insomnia?, Stop    breathing at  "sleep?, Other      Integumentary:  Denies: Rash, Dry skin, Skin Warm to Touch, Other      Immunologic/Allergic:  Denies: Latex allergy, Seasonal allergies, Asthma,     Urticaria, Eczema, Other      Immunization status:  No: Up to date            FAMILY/SOCIAL/MEDICAL HX      Surgical History:  Yes: Bowel Surgery (COLONOSCOPY WITH POLYP REMOVAL), Head     Surgery (BILAT CATARACT WITH IMPLANTS/ sinus), Oral Surgery (T   Surgery (RIGHT TKR)      Diabetes - Family Hx:  Grandparent      Cancer/Type - Family Hx:  Father      Is Father Still Living?:  No      Is Mother Still Living?:  No       Family History:  Yes      Social History:  No Tobacco Use, No Alcohol Use, No Recreational Drug use      Smoking status:  Former smoker      Medical History:  Yes: Arthritis (GENERALIZED ), Diabetes (TYPE II NIDDM, BG     RUNS AROUND 130-136 IN AM), Hemorrhoids/Rectal Prob (ACID REFLUX ), High Blood     Pressure (ON MEDICATIONS ); No: Blood Disease, Chemotherapy/Cancer, Deafness or     Ringing Ears, Shortness Of Breath, Sinus Trouble, Miscellaneous Medical/oth      Psychiatric History      none            PREVENTION      Hx Influenza Vaccination:  Yes      Date Influenza Vaccine Given:  Nov 1, 2018      2 or More Falls Past Year?:  No      Fall Past Year with Injury?:  No      Hx Pneumococcal Vaccination:  Yes      Encouraged to follow-up with:  PCP regarding preventative exams.      Chart initiated by      katerin/ ma            ALLERGIES/MEDICATIONS      Allergies:        Coded Allergies:             CEFAZOLIN (Verified  Allergy, Unknown, HIVES, 8/7/19)           CEPHALEXIN (Verified  Allergy, Unknown, HIVES, 8/7/19)           CEPHALOSPORINS (Verified  Allergy, Unknown, HIVES, 8/7/19)           HYDROCODONE (Verified  Adverse Reaction, Unknown, \"MAKES ME MEAN\", 8/7/19)                  PT REPORTED HE CAN TAKE TYLENOL      Medications    Last Reconciled on 8/7/19 10:11 by VINICIO FIERRO MD      Ofloxacin (Floxin 0.3% Otic " Soln) 5 Ml Drops      5 DROPS EAR EACH BID, #1 BOTTLE 3 Refills         Prov: ALEJANDRINA Hopper         7/23/19       Azithromycin (Zithromax Z-Tommy) 250 Mg Tablet      250 MG PO ASDIR, #1 PACKET         Prov: ALEJANDRINA Hopper         7/23/19       Sodium Chloride (Ocean Nasal Spray) 104 Ml Spray      2 SPRAYS NARE EACH Q1H WA, #1 BOTTLE 5 Refills         Prov: ALEJANDRINA Hopper         7/23/19       oxyCODONE-Acetaminophen 7.5-325 Mg (oxyCODONE-Acetaminophen 7.5-325 Mg) 1 Each     Tablet      1 TAB PO Q4-6H PRN for PAIN, #40 TAB 0 Refills         Prov: ALEJANDRINA Hopper         7/23/19       Promethazine Hcl (Phenergan*) 25 Mg Supp.rect      25 MG RECTAL Q4H PRN for NAUSEA AND/OR VOMITING, #2 SUPP 0 Refills         Prov: ALEJANDRINA Hopper         7/23/19       Rosuvastatin Calcium (Crestor*) 10 Mg Tablet      10 MG PO HS, #30 TAB 0 Refills         Reported         7/18/19       Fluticasone/Umeclidin/Vilanter (Trelegy Ellipta 100-62.5-25) 1 Each Blst.w.dev      1 PUFF INH RTQDAY, #1 INH 3 Refills         Prov: MICHAELAJACOBVINICIO CASTILLO         7/1/19       Ezetimide (Zetia) 10 Mg Tablet      10 MG PO HS, #30 TAB 0 Refills         Reported         6/7/19       Metoprolol Tartrate (Metoprolol Tartrate) 25 Mg Tablet      12.5 MG PO QDAY, #30 TAB 0 Refills         Reported         6/7/19       MDI-Albuterol (Ventolin HFA) 18 Gm Hfa.aer.ad      2 PUFFS INH Q4H PRN for SHORTNESS OF BREATH, #1 INH 0 Refills         Reported         6/7/19       Montelukast Sodium (Singulair*) 10 Mg Tablet      10 MG PO HS, #30 TAB 0 Refills         Reported         6/7/19       Metformin Hcl (Metformin Hcl) 500 Mg Tablet      500 MG PO BID, #120 TAB 0 Refills         Reported         1/23/17       Olmesartan/Hydrochlorothiazide (Benicar Hct 40/12.5 Mg) 1 Tab Tablet      1 TAB PO QDAY         Reported         11/13/13       Famotidine (Famotidine) 20 Mg Tablet      20 MG PO BID, TAB         Reported         11/13/13      Current Medications      Current Medications Reviewed 8/7/19             EXAM      Vital Signs Reviewed.      General:  WDWN, Alert, NAD.      HEENT: PERRL, EOMI.  OP, nares clear, no sinus tenderness.      Neck: Supple, no JVD, no thyromegaly.      Chest: Good aeration, clear to auscultation bilaterally, tympanic to percussion     bilaterally, no work of breathing noted.      CV: RRR, no MGR, pulses 2+, equal.        Abd: Obese, soft, NT, ND, +BS, no HSM.      EXT: No clubbing, no cyanosis, no edema.        Neuro:  A  Skin: No rashes or lesions.      Vtials      Vitals:             Height 5 ft 11 in / 180.34 cm           Weight 273 lbs 2 oz / 123.256728 kg           BSA 2.41 m2           BMI 38.1 kg/m2           Temperature 98.3 F / 36.83 C - Oral           Pulse 95           Respirations 14           Blood Pressure 151/86 Sitting, Right Arm           Pulse Oximetry 95%, roomair            REVIEW      Results Reviewed      PCCS Results Reviewed?:  Yes Prev Lab Results, Yes Prev Radiology Results, Yes     Previous [facility] Records      Lab Results      I reviewed my last office note. I reviewed alpha 1 antitrypsin with normal     genotype MM.  I reviewed labs from 07/2019 showing 600 peripheral eosinophils as    well as no evidence of chronic hypercapnic respiratory failure.  I also reviewed    operative notes from Dr. Hopper in 07/2019.  I also reviewed the patient's pat    hology report from sinus surgery showing fragments of respiratory mucosa with     fibrosis and chronic inflammation as well as sinonasal polyps. Pathology reports    do show a number of eosinophils in some of the samples.            Assessment      IMPRESSION:      1.  Dyspnea on exertion, improved.      2.  Cough, improved.      3.  Wheeze, improved.      4.  Chronic sinusitis, improved after sinus surgery.      5.  Combined pulmonary fibrosis and emphysema.      6. Pulmonary fibrosis secondary to occupational exposures to inhaled chemicals     while working at [employer].  Appears chronic and stable.      7.   Moderate COPD, FEV1 68% of predicted. Doing well on Trelegy with COPD     assessment test score 6 today.      8.  Obesity with BMI 38.1.               PLAN:      1.  Appreciate Dr. Hopper doing sinus surgery and patient is having clinical     improvement in sinus and respiratory disease.      2.  Continue Trelegy.        3. Occupational exposures are likely the etiology of the patient's stable     pulmonary fibrosis.  No indication to treat at this time, just monitor.      4. Alpha 1 antitrypsin testing unremarkable.      5. No indication for oxygen.      6. No indication for lung cancer screening.      7. Continue Singulair, Zyrtec and allergy immunotherapy workup per Dr. Hopper.      8. Encouraged diet and weight loss.      9.  Up-to-date with flu vaccine and Prevnar.  Pneumovax will be due in early     2020.        10.  I performed exhale nitric oxide testing in the office today.  Level was     decreased from 49 to 36 showing an improvement in eosinophilic airway     inflammation, likely a result of his surgery for chronic sinusitis.        10. Follow up with me in six months.                 Disclaimer: Converted document may not contain table formatting or lab diagrams. Please see [employer] for the authenticated document.

## 2021-05-28 NOTE — PROGRESS NOTES
Patient: IMANI ARELLANO     Acct: [id number]     Report: #[id number]  UNIT #: [id number]     : 1945    Encounter Date:2020  PRIMARY CARE: JOSE CHRIS  ***Signed***  --------------------------------------------------------------------------------------------------------------------  Chief Complaint      Encounter Date      2020            Primary Care Provider            larry norman            Referring Provider            larry norman            Patient Complaint      Patient is complaining of      f/u emphysema            VITALS      Height 5 ft 11 in / 180.34 cm      Weight 265 lbs 0 oz / 120.048486 kg      BSA 2.38 m2      BMI 37.0 kg/m2      Temperature 98.7 F / 37.06 C - Oral      Pulse 94      Respirations 14      Blood Pressure 146/88 Sitting, Right Arm      Pulse Oximetry 98%, roomair      Initial Exhaled Nitrous Oxide      Date:  2019      Exhaled Nitrous Oxide Results:  49            HPI      The patient is a very pleasant 74 year old  male former cigarettes     smoker here for follow up today.             Since his last office visit he has been doing well, he is on trelegy ellipta     inhaler, Singulair and zyrtec. He has had no sinus issues. He gets short of     breath walking about 1500 feet and can go up about 2 flights of steps without     stopping. He denies any coughing, wheezing, headaches and hemoptysis or chest     pain. He does have some chronic pulmonary fibrosis secondary to exposures     working for [employer] for about 30 years. He is able to perform his ADL's     without difficulty and denies any swollen glands in his lymph nodes, head or     neck. He denies any itchy, scratchy throat and watery eyes and nasal congestion.                 I personally reviewed Review of Systems, family, social, surgical and medical     history and agree with their findings.            ROS      Constitutional:  Denies: Fatigue, Fever, Weight gain,  Weight loss, Chills,     Insomnia, Other      Respiratory/Breathing:  Denies: Shortness of air, Wheezing, Cough, Hemoptysis,     Pleuritic pain, Other      Endocrine:  Denies: Polydipsia, Polyuria, Heat/cold intolerance, Diabetes, Other      Eyes:  Denies: Blurred vision, Vision Changes, Other      Ears, nose, mouth, throat:  Denies: Mouth lesions, Thrush, Throat pain,     Hoarseness, Allergies/Hay Fever, Post Nasal Drip, Headaches, Recent Head Injury,    Nose Bleeding, Neck Stiffness, Thyroid Mass, Hearing Loss, Ear Fullness, Dry     Mouth, Nasal or Sinus Pain, Dry Lips, Nasal discharge, Nasal congestion, Other      Cardiovascular:  Denies: Palpitations, Syncope, Claudication, Chest Pain, Wake     up Gasping for air, Leg Swelling, Irregular Heart Rate, Cyanosis, Dyspnea on     Exertion, Other      Gastrointestinal:  Denies: Nausea, Constipation, Diarrhea, Abdominal pain,     Vomiting, Difficulty Swallowing, Reflux/Heartburn, Dysphagia, Jaundice,     Bloating, Melena, Bloody stools, Other      Genitourinary:  Denies: Urinary frequency, Incontinence, Hematuria, Urgency,     Nocturia, Dysuria, Testicular problems, Other      Musculoskeletal:  Denies: Joint Pain, Joint Stiffness, Joint Swelling, Myalgias,    Other      Hematologic/lymphatic:  DENIES: Lymphadenopathy, Bruising, Bleeding tendencies,     Other      Neurological:  Denies: Headache, Numbness, Weakness, Seizures, Other      Psychiatric:  Denies: Anxiety, Appropriate Effect, Depression, Other      Sleep:  No: Excessive daytime sleep, Morning Headache?, Snoring, Insomnia?, Stop    breathing at sleep?, Other      Integumentary:  Denies: Rash, Dry skin, Skin Warm to Touch, Other      Immunologic/Allergic:  Denies: Latex allergy, Seasonal allergies, Asthma,     Urticaria, Eczema, Other      Immunization status:  No: Up to date            FAMILY/SOCIAL/MEDICAL HX      Surgical History:  Yes: Bowel Surgery (COLONOSCOPY WITH POLYP REMOVAL), Head     Surgery (BILAT  "CATARACT WITH IMPLANTS/ sinus), Oral Surgery (T   Surgery (RIGHT TKR)      Diabetes - Family Hx:  Grandparent      Cancer/Type - Family Hx:  Father      Is Father Still Living?:  No      Is Mother Still Living?:  No       Family History:  Yes      Social History:  No Tobacco Use, No Alcohol Use, No Recreational Drug use      Smoking status:  Former smoker      Medical History:  Yes: Arthritis (GENERALIZED ), Diabetes (TYPE II NIDDM, BG     RUNS AROUND 130-136 IN AM), Hemorrhoids/Rectal Prob (ACID REFLUX ), High Blood     Pressure (ON MEDICATIONS ); No: Blood Disease, Chemotherapy/Cancer, Deafness or     Ringing Ears, Shortness Of Breath, Sinus Trouble, Miscellaneous Medical/oth      Psychiatric History      none            PREVENTION      Hx Influenza Vaccination:  Yes      Date Influenza Vaccine Given:  Nov 1, 2019      Hx Pneumococcal Vaccination:  Yes      Encouraged to follow-up with:  PCP regarding preventative exams.      Chart initiated by      jose j solo/ ma            ALLERGIES/MEDICATIONS      Allergies:        Coded Allergies:             CEFAZOLIN (Verified  Allergy, Unknown, HIVES, 8/7/19)           CEPHALEXIN (Verified  Allergy, Unknown, HIVES, 8/7/19)           CEPHALOSPORINS (Verified  Allergy, Unknown, HIVES, 8/7/19)           HYDROCODONE (Verified  Adverse Reaction, Unknown, \"MAKES ME MEAN\", 8/7/19)                  PT REPORTED HE CAN TAKE TYLENOL      Medications    Last Reconciled on 2/12/20 09:36 by VINICIO FIERRO MD      Fluticasone/Umeclidin/Vilanter (Trelegy Ellipta 100-62.5-25) 1 Each Blst.w.dev      1 PUFF INH RTQDAY, #1 INH 5 Refills         Prov: VINICIO FIERRO         11/27/19       Ofloxacin (Floxin 0.3% Otic Soln) 5 Ml Drops      5 DROPS EAR EACH BID, #1 BOTTLE 3 Refills         Prov: ALEJANDRINA Hopper         7/23/19       Azithromycin (Zithromax Z-Tommy) 250 Mg Tablet      250 MG PO ASDIR, #1 PACKET         Prov: ALEJANDRINA Hopper         7/23/19       Sodium Chloride (Ocean Nasal Spray) 104 Ml " Spray      2 SPRAYS NARE EACH Q1H WA, #1 BOTTLE 5 Refills         Prov: ALEJANDRINA Hopper         7/23/19       oxyCODONE-Acetaminophen 7.5-325 Mg (oxyCODONE-Acetaminophen 7.5-325 Mg) 1 Each     Tablet      1 TAB PO Q4-6H PRN for PAIN, #40 TAB 0 Refills         Prov: ALEJANDRINA Hopper         7/23/19       Promethazine Hcl (Phenergan*) 25 Mg Supp.rect      25 MG RECTAL Q4H PRN for NAUSEA AND/OR VOMITING, #2 SUPP 0 Refills         Prov: ALEJANDRINA Hopper         7/23/19       Rosuvastatin Calcium (Crestor*) 10 Mg Tablet      10 MG PO HS, #30 TAB 0 Refills         Reported         7/18/19       Ezetimide (Zetia) 10 Mg Tablet      10 MG PO HS, #30 TAB 0 Refills         Reported         6/7/19       Metoprolol Tartrate (Metoprolol Tartrate) 25 Mg Tablet      12.5 MG PO QDAY, #30 TAB 0 Refills         Reported         6/7/19       MDI-Albuterol (Ventolin HFA) 18 Gm Hfa.aer.ad      2 PUFFS INH Q4H PRN for SHORTNESS OF BREATH, #1 INH 0 Refills         Reported         6/7/19       Montelukast Sodium (Singulair*) 10 Mg Tablet      10 MG PO HS, #30 TAB 0 Refills         Reported         6/7/19       metFORMIN HCl (metFORMIN HCl) 500 Mg Tablet      500 MG PO BID, #120 TAB 0 Refills         Reported         1/23/17       Olmesartan/Hydrochlorothiazide (Benicar Hct 40/12.5 Mg) 1 Tab Tablet      1 TAB PO QDAY         Reported         11/13/13       Famotidine (Famotidine) 20 Mg Tablet      20 MG PO BID, TAB         Reported         11/13/13      Current Medications      Current Medications Reviewed 2/12/20            EXAM      Vital Signs Reviewed.      General:  WDWN, Alert, NAD.      HEENT: PERRL, EOMI.  OP, nares clear, no sinus tenderness.      Neck: Supple, no JVD, no thyromegaly.      Chest: Good aeration, clear to auscultation bilaterally, tympanic to percussion     bilaterally, no work of breathing noted.      CV: RRR, no MGR, pulses 2+, equal.        Abd: Obese, soft, NT, ND, +BS, no HSM.      EXT: No clubbing, no cyanosis, no edema.         Neuro:  A  Skin: No rashes or lesions.      Vtials      Vitals:             Height 5 ft 11 in / 180.34 cm           Weight 265 lbs 0 oz / 120.454171 kg           BSA 2.38 m2           BMI 37.0 kg/m2           Temperature 98.7 F / 37.06 C - Oral           Pulse 94           Respirations 14           Blood Pressure 146/88 Sitting, Right Arm           Pulse Oximetry 98%, roomair            REVIEW      Results Reviewed      PCCS Results Reviewed?:  Yes Prev Radiology Results, Yes Previous [facility] Records      Radiographic Results               [facility]                PACS RADIOLOGY REPORT            Patient: IMANI ARELLANO   Acct: #[id number]   Report: #[id number]            UNIT #: [id number]    DOS: 20 1107      INSURANCE:MEDICARE PART A   LOCATION:[facility]     : 1945            PROVIDERS      ADMITTING:     ATTENDING: TRISTIN ELIZABETH      FAMILY:  CHRISTINA CARDONA   ORDERING:  TRISTIN ELIZABETH         OTHER:    DICTATING:  Fidencio Emanuel MD, IV            REQ #:[id number]   EXAM:CXR2 - CHEST 2V AP PA LAT      REASON FOR EXAM:  ACUTE BRONCHITIS/SHORTNESS OF BREATH/WHEEZING      REASON FOR VISIT:  J20.8/R06.02/R06.2            *******Signed******         PROCEDURE:   CHEST AP/PA AND LATERAL             COMPARISON:   [facility], CR, CHEST PA/AP   2018, 9:03.        [facility], CR, CHEST PA/AP   [facility], CR, CHEST PA/AP          INDICATIONS:   SHORTNESS OF BREATH WITH COUGH             FINDINGS:                The lungs are well-expanded. The heart and pulmonary vasculature are within     normal limits. No       pleural effusions are identified. There are no active appearing infiltrates.             IMPRESSION: No active disease.             FIDENCIO EMANUEL MD             Electronically Signed and Approved By: FIDENCIO EMAUNEL MD on 2020 at 11:36                            Until signed, this is an unconfirmed preliminary report that may contain      errors and is subject to change.                                              LINDENJE:      D:01/20/20 1136            Assessment      Pulmonary fibrosis - J84.10            FILOMENA (obstructive sleep apnea) - G47.33            Notes      New Office Procedures      * Pneumovax-23, As Soon As Possible         Pneumococcal Vaccine Polyvalen (Pneumovax-23) 25 MCG/0.5 ML VIAL: 25        MICROGRAM INTRAMUSCULARLY Qty 25 MCG         Dx: Pulmonary fibrosis - J84.10      IMPRESSION:      1. Chronic dyspnea.       2. Cough resolved.       3. Chronic sinusitis improved after sinus surgery.       4. Component of pulmonary fibrosis with emphysema. FEV1 61% on trelegy ellipta     inhaler. Chronic obstructive pulmonary disease assessment test score is 2 today     and the patient is doing great.       5. Obesity with BMI 37.0.            PLAN:      1. Continue to follow up with Dr. Hopper after sinus surgery.       2. Continue trelegy ellipta inhaler with albuterol as needed.       3. Pulmonary fibrosis stable likely secondary to to years of occupational     exposures. No indication for antifibrotics.       4. No indication for lung cancer screening.       5. Continue Singulair and zyrtec and allergy immunotherapy.       6.  I spent 5 minutes discussing diet and exercise counseling. I recommend 30     minutes of daily exercise as well as 1800 calorie low fat diet.        7. Up to date with flu and Prevnar, I will give him Pneumovax today.       8. Follow up with us annually.            Patient Education      ACO BMI High above 25:  Counseling Given, Encouraged weight loss, Encourage     dietary changes            Electronically signed by VINICIO FIERRO  02/19/2020 16:03       Disclaimer: Converted document may not contain table formatting or lab diagrams. Please see [employer] for the authenticated document.

## 2021-06-03 NOTE — CONSULTS
Patient: IMANI GOMEZ     Acct: [id number]     Report: #[id number]  MR #:  [id number]     DOS: 2021 0726     : 1945  DICTATING: Mery Badillo  ***Signed***  --------------------------------------------------------------------------------------------------------------------                              [employer]                          [place], [place]  [address]           __________________________________________________________________________         Patient Name:                   Attending Physician:    Imani Gomez M.D.         Patient Visit # MR #            Admit Date  Disch Date     Location    [id number]    [id number]      2021                 SLEEP- -         Date of Birth    1945    __________________________________________________________________________    0814 - SLEEP FOLLOW UP VISIT         DATE OF SERVICE:  2021         HISTORY:    The patient is a 76 year-old male who is returning for a follow-up visit.         REASON FOR FOLLOW-UP VISIT:    Obstructive sleep apnea.      The patient is needing a new CPAP machine.         HISTORY OF PRESENT ILLNESS:    He has severe obstructive sleep apnea diagnosed in  with an AHI of 86.2    events per hour total sleep time.         He has been treated with CPAP and has been compliant with CPAP use.  He has    benefited from CPAP treatment in that his apneic episodes and snoring have    improved.  The patient called recently because he says that his current CPAP    machine is not working as well.  It is not giving him the regular air    pressure that he is used to.  In addition, when he was seen last year and the    CPAP machine was adjusted to 16 cm water, because of residual events, it did    not re-set.         He has tried to use his CPAP machine as best he could.  He says that he wakes    up with a  dry mouth even if his humidifier is set at 5.         He is currently using a full face mask.  Denies any problem with device    settings.  His [employer] company sends him his supplies on a fairly regular basis.         He gets approximately 8 hours of sleep during the night.  He mostly stays    asleep once he is asleep.         REVIEW OF SYSTEMS:    No significant weight changes since last follow-up visit.  No recent cardiac    complaints.  No history of stroke.  No recent hospitalizations or surgeries,    injuries to the nose or surgeries to the nose/throat area.         REVIEW OF SYSTEMS:    No significant weight changes.  He has allergies.  He gets allergy shots once    a week.         PAST MEDICAL HISTORY:    Positive for:    1. Diabetes.    2. Hypertension.    3. Allergies.    4. Coronary artery disease (stents in 1999).         CURRENT MEDICATIONS:    1. Glipizide.    2. Trelegy.    3. Rosuvastatin.    4. Olmesartan.    5. Fluticasone.    6. Ezetimibe.    7. Montelukast.    8. Vitamin B12.    9. Vitamin D.    10. Famotidine.    11. Ventolin inhaler.    12. Aspirin.    13. Hydrochlorothiazide.         ALLERGIES:    Keflex.         SOCIAL HISTORY:    Does not smoke or consume alcohol.  Caffeine intake:  Two cups of coffee    during the day.         COMPLIANCE INFORMATION:    Compliance information was reviewed from 02/18/2021 to 05/18/2021 showing    100% days with device usage.  Average usage for days used:  8 hours 4    minutes.  Percent of days with usage equal to or greater than 4 hours:    98.9%.  Average AHI is 8.9.  Current pressure is 15 cm water.  Average    vibratory snore index 103.6 (the patient reports that his wife tells him that    he is still snoring even with his CPAP unit/mask on).         PHYSICAL EXAMINATION:    GENERAL:  He is a friendly, well-developed, well-nourished, cooperative male,    who did not appear to be in any acute distress.    VITAL SIGNS:  Blood pressure 141/79, heart rate 97 per  minute, O2 saturation    95% on room air, temperature 97.1 degrees Fahrenheit, weight 275 pounds,    height 5 feet 11 inches, BMI 38.    NECK:  Short neck.  No masses noted.  No carotid bruits.    HEART:  Regular rate and rhythm.    LUNGS:  Clear to auscultation.  His breathing is also audible.  He has    bilateral basilar crackles.    NEURO/PSYCH:  He is alert.  He is oriented.  His responses are coherent and    relevant.  Mood and affect appeared appropriate.  [place] score 2/24.         IMPRESSION:    Obstructive sleep apnea.  The patient has benefited and is compliant with    CPAP use.  The patient is requesting a new CPAP machine as his current one    doesn't appear to be working as well.         PLAN:    1. I will order a new CPAP machine for him.  In the meantime, while waiting    for a new CPAP unit, we will try to adjust (again) his CPAP settings to 16-20    cm water pressure for residual events and vibratory snores.    2. He is going to return for face-to-face compliance visit after he gets a    new CPAP unit.         To be electronically signed in [facility]    [id number] MERY PIERSON M.D.         EG:robbin    D:  05/20/2021 21:29    T:  05/22/2021 07:03    #[id number]         CC: SLEEP LAB        APARNA NINO         Until signed, this is an unconfirmed preliminary report that may contain    errors and is subject to change.         Electronically signed by Mery Pierson  05/23/2021 20:09     Disclaimer: Converted hospital document may not contain table formatting or lab diagrams. Please see [facility] for authenticated document.

## 2021-07-07 RX ORDER — ROSUVASTATIN CALCIUM 10 MG/1
TABLET, COATED ORAL
Qty: 15 TABLET | Refills: 7 | Status: SHIPPED | OUTPATIENT
Start: 2021-07-07 | End: 2022-05-31 | Stop reason: ALTCHOICE

## 2021-08-17 ENCOUNTER — OFFICE VISIT (OUTPATIENT)
Dept: SLEEP MEDICINE | Facility: HOSPITAL | Age: 76
End: 2021-08-17

## 2021-08-17 VITALS
WEIGHT: 274 LBS | SYSTOLIC BLOOD PRESSURE: 158 MMHG | HEIGHT: 71 IN | OXYGEN SATURATION: 100 % | TEMPERATURE: 98.1 F | HEART RATE: 89 BPM | DIASTOLIC BLOOD PRESSURE: 90 MMHG | BODY MASS INDEX: 38.36 KG/M2

## 2021-08-17 DIAGNOSIS — G47.33 OSA (OBSTRUCTIVE SLEEP APNEA): Primary | ICD-10-CM

## 2021-08-17 PROCEDURE — G0463 HOSPITAL OUTPT CLINIC VISIT: HCPCS

## 2021-08-18 NOTE — PROGRESS NOTES
"  [facility]    SLEEP CLINIC FOLLOW UP PROGRESS NOTE.    Abdi Gomez  1945  76 y.o.  male      PCP: Jaimie Stallworth APRN      Date of visit: 8/17/2021    Patient is returning today for follow-up  Reason for follow-up visit: Obstructive sleep apnea and face-to-face compliance    HPI:  This is a 76 y.o. years old patient who has a history of obstructive sleep apnea.  He is here is here for a face-to-face compliance follow-up.  He recently obtained a new CPAP machine. Sleep apnea is severe in  with a AHI of 86.2/hr. sleep study was done in 2013 patient is using positive airway pressure therapy with CPAP and the symptoms of snoring, non-restorative sleep and daytime excessive sleepiness have improved significantly on the therapy.   He normally  goes to bed at 10 PM and wakes up at 6:30 AM getting approximately 8 hours of sleep during the night.  He mostly stays asleep.  Feels refreshed after waking up.  Patient also denies headaches and nasal congestion.   He is using his CPAP machine regularly.  He denies any problems with device settings.  The only problem he has with the new unit is that it \" kicks on too quickly\".    Mediactions and allergies are reviewed by me and documented in the encounter.     SOCIAL ( habits pertaining to sleep medicine)  History of smoking:No   History of alcohol use: 0 per week  Caffeine use: 1 cup of coffee    REVIEW OF SYSTEMS:   [place] Sleepiness Scale :Total score: 0   Nsaal congestion: No  Dry mouth/nose: No  Post nasal drip; no  Acid reflux/Heartburn: Yes  Abd bloating: No  Morining headache: No  Anxiety: No  Depression: No    Physical Exam :  CONSTITUTIONAL: He is awake and alert.  He is not in any form of distress  Vitals:    08/17/21 1100   BP: 158/90   Pulse: 89   Temp: 98.1 °F (36.7 °C)   SpO2: 100%   Weight: 124 kg (274 lb)   Height: 180.3 cm (71\")    Body mass index is 38.22 kg/m².   Neck: No masses noted.  No carotid bruit  RESP SYSTEM: Breath " sounds are normal, no wheezes or crackles  CARDIOVASULAR: Heart rate is regular without murmur. No edema  Neuropsych: Is alert and oriented.  Responses are coherent and relevant.  Mood and affect appeared appropriate.  [place] score 0/24.    Data reviewed: Smartcard download was relayed to me by Manuel as download was not available in print.  The Smart card downloaded on 8/17/2021 has been reviewed independently by me for compliance and discussed the data with the patient.   Compliance; 100%  More than 4 hr use, 100%  Average use of the device 8 hours per night  Residual AHI: 15 .6 /hr (goal < 5.0 /hr)  Mask type: Nasal  DME:.      ASSESSMENT AND PLAN:  · Obstructive sleep apnea ( G 47.33).  The symptoms of sleep apnea have improved with the device and the treatment.  Patient's compliance with the device is excellent for treatment of sleep apnea.  I have independently reviewed the smart card down load and discussed with the patient the download data and encouraged  the patient to continue to use the device.The residual AHI is acceptable. The patient is also instructed to get the supplies from the DME company and and change them on a regular basis.  A prescription for supplies has been sent to the DME company.  I have also discussed the good sleep hygiene habits and adequate amount of sleep needed for good health.  · We will adjust the ramp time to 20 minutes and start ramp pressure at 5 cmH2O.  · Obesity BMI 38.2  · Patient will return for follow-up visit in 1 year.. Patient's questions were answered.      Mery Badillo MD  8/17/2021

## 2021-08-30 RX ORDER — EZETIMIBE 10 MG/1
10 TABLET ORAL EVERY OTHER DAY
Qty: 15 TABLET | Refills: 6 | Status: SHIPPED | OUTPATIENT
Start: 2021-08-30 | End: 2022-05-31 | Stop reason: SDUPTHER

## 2021-09-07 ENCOUNTER — LAB (OUTPATIENT)
Dept: LAB | Facility: HOSPITAL | Age: 76
End: 2021-09-07

## 2021-09-07 ENCOUNTER — TRANSCRIBE ORDERS (OUTPATIENT)
Dept: LAB | Facility: HOSPITAL | Age: 76
End: 2021-09-07

## 2021-09-07 DIAGNOSIS — N18.31 CHRONIC KIDNEY DISEASE (CKD) STAGE G3A/A1, MODERATELY DECREASED GLOMERULAR FILTRATION RATE (GFR) BETWEEN 45-59 ML/MIN/1.73 SQUARE METER AND ALBUMINURIA CREATININE RATIO LESS THAN 30 MG/G (CMS/H* (HCC): ICD-10-CM

## 2021-09-07 DIAGNOSIS — N18.31 CHRONIC KIDNEY DISEASE (CKD) STAGE G3A/A1, MODERATELY DECREASED GLOMERULAR FILTRATION RATE (GFR) BETWEEN 45-59 ML/MIN/1.73 SQUARE METER AND ALBUMINURIA CREATININE RATIO LESS THAN 30 MG/G (CMS/H* (HCC): Primary | ICD-10-CM

## 2021-09-07 LAB
ALBUMIN SERPL-MCNC: 4.4 G/DL (ref 3.5–5.2)
ALBUMIN/GLOB SERPL: 1.6 G/DL
ALP SERPL-CCNC: 79 U/L (ref 39–117)
ALT SERPL W P-5'-P-CCNC: 9 U/L (ref 1–41)
ANION GAP SERPL CALCULATED.3IONS-SCNC: 10.8 MMOL/L (ref 5–15)
AST SERPL-CCNC: 16 U/L (ref 1–40)
BASOPHILS # BLD AUTO: 0.04 10*3/MM3 (ref 0–0.2)
BASOPHILS NFR BLD AUTO: 0.5 % (ref 0–1.5)
BILIRUB SERPL-MCNC: 0.8 MG/DL (ref 0–1.2)
BILIRUB UR QL STRIP: NEGATIVE
BUN SERPL-MCNC: 12 MG/DL (ref 8–23)
BUN/CREAT SERPL: 10.1 (ref 7–25)
CALCIUM SPEC-SCNC: 9.6 MG/DL (ref 8.6–10.5)
CHLORIDE SERPL-SCNC: 101 MMOL/L (ref 98–107)
CLARITY UR: CLEAR
CO2 SERPL-SCNC: 25.2 MMOL/L (ref 22–29)
COLOR UR: YELLOW
CREAT SERPL-MCNC: 1.19 MG/DL (ref 0.76–1.27)
CREAT UR-MCNC: 28.4 MG/DL
DEPRECATED RDW RBC AUTO: 41.7 FL (ref 37–54)
EOSINOPHIL # BLD AUTO: 0.46 10*3/MM3 (ref 0–0.4)
EOSINOPHIL NFR BLD AUTO: 5.2 % (ref 0.3–6.2)
ERYTHROCYTE [DISTWIDTH] IN BLOOD BY AUTOMATED COUNT: 13 % (ref 12.3–15.4)
GFR SERPL CREATININE-BSD FRML MDRD: 60 ML/MIN/1.73
GLOBULIN UR ELPH-MCNC: 2.8 GM/DL
GLUCOSE SERPL-MCNC: 151 MG/DL (ref 65–99)
GLUCOSE UR STRIP-MCNC: NEGATIVE MG/DL
HCT VFR BLD AUTO: 45.9 % (ref 37.5–51)
HGB BLD-MCNC: 15.7 G/DL (ref 13–17.7)
HGB UR QL STRIP.AUTO: NEGATIVE
IMM GRANULOCYTES # BLD AUTO: 0.04 10*3/MM3 (ref 0–0.05)
IMM GRANULOCYTES NFR BLD AUTO: 0.5 % (ref 0–0.5)
KETONES UR QL STRIP: NEGATIVE
LEUKOCYTE ESTERASE UR QL STRIP.AUTO: NEGATIVE
LYMPHOCYTES # BLD AUTO: 2.18 10*3/MM3 (ref 0.7–3.1)
LYMPHOCYTES NFR BLD AUTO: 24.8 % (ref 19.6–45.3)
MCH RBC QN AUTO: 30.2 PG (ref 26.6–33)
MCHC RBC AUTO-ENTMCNC: 34.2 G/DL (ref 31.5–35.7)
MCV RBC AUTO: 88.3 FL (ref 79–97)
MONOCYTES # BLD AUTO: 0.48 10*3/MM3 (ref 0.1–0.9)
MONOCYTES NFR BLD AUTO: 5.5 % (ref 5–12)
NEUTROPHILS NFR BLD AUTO: 5.58 10*3/MM3 (ref 1.7–7)
NEUTROPHILS NFR BLD AUTO: 63.5 % (ref 42.7–76)
NITRITE UR QL STRIP: NEGATIVE
NRBC BLD AUTO-RTO: 0 /100 WBC (ref 0–0.2)
PH UR STRIP.AUTO: 6.5 [PH] (ref 5–8)
PHOSPHATE SERPL-MCNC: 2.9 MG/DL (ref 2.5–4.5)
PLATELET # BLD AUTO: 196 10*3/MM3 (ref 140–450)
PMV BLD AUTO: 10.9 FL (ref 6–12)
POTASSIUM SERPL-SCNC: 4.2 MMOL/L (ref 3.5–5.2)
PROT SERPL-MCNC: 7.2 G/DL (ref 6–8.5)
PROT UR QL STRIP: NEGATIVE
PROT UR-MCNC: 4 MG/DL
PTH-INTACT SERPL-MCNC: 32.6 PG/ML (ref 15–65)
RBC # BLD AUTO: 5.2 10*6/MM3 (ref 4.14–5.8)
SODIUM SERPL-SCNC: 137 MMOL/L (ref 136–145)
SP GR UR STRIP: 1.01 (ref 1–1.03)
UROBILINOGEN UR QL STRIP: NORMAL
WBC # BLD AUTO: 8.78 10*3/MM3 (ref 3.4–10.8)

## 2021-09-07 PROCEDURE — 36415 COLL VENOUS BLD VENIPUNCTURE: CPT

## 2021-09-07 PROCEDURE — 84100 ASSAY OF PHOSPHORUS: CPT

## 2021-09-07 PROCEDURE — 80053 COMPREHEN METABOLIC PANEL: CPT

## 2021-09-07 PROCEDURE — 82570 ASSAY OF URINE CREATININE: CPT

## 2021-09-07 PROCEDURE — 81003 URINALYSIS AUTO W/O SCOPE: CPT

## 2021-09-07 PROCEDURE — 84156 ASSAY OF PROTEIN URINE: CPT

## 2021-09-07 PROCEDURE — 83970 ASSAY OF PARATHORMONE: CPT

## 2021-09-07 PROCEDURE — 85025 COMPLETE CBC W/AUTO DIFF WBC: CPT

## 2021-10-07 ENCOUNTER — OFFICE VISIT (OUTPATIENT)
Dept: ORTHOPEDIC SURGERY | Facility: CLINIC | Age: 76
End: 2021-10-07

## 2021-10-07 VITALS — OXYGEN SATURATION: 97 % | BODY MASS INDEX: 39.11 KG/M2 | WEIGHT: 279.4 LBS | HEART RATE: 104 BPM | HEIGHT: 71 IN

## 2021-10-07 DIAGNOSIS — M17.12 PRIMARY OSTEOARTHRITIS OF LEFT KNEE: Primary | ICD-10-CM

## 2021-10-07 DIAGNOSIS — M25.562 LEFT KNEE PAIN, UNSPECIFIED CHRONICITY: ICD-10-CM

## 2021-10-07 PROCEDURE — 20610 DRAIN/INJ JOINT/BURSA W/O US: CPT | Performed by: ORTHOPAEDIC SURGERY

## 2021-10-07 PROCEDURE — 99203 OFFICE O/P NEW LOW 30 MIN: CPT | Performed by: ORTHOPAEDIC SURGERY

## 2021-10-07 RX ORDER — LANOLIN ALCOHOL/MO/W.PET/CERES
1000 CREAM (GRAM) TOPICAL DAILY
COMMUNITY

## 2021-10-07 RX ORDER — GLIPIZIDE 2.5 MG/1
2.5 TABLET, EXTENDED RELEASE ORAL 2 TIMES DAILY
COMMUNITY

## 2021-10-07 RX ORDER — CETIRIZINE HYDROCHLORIDE 10 MG/1
10 TABLET ORAL DAILY
COMMUNITY

## 2021-10-07 RX ORDER — FAMOTIDINE 20 MG/1
20 TABLET, FILM COATED ORAL 2 TIMES DAILY
COMMUNITY

## 2021-10-07 RX ORDER — HYDROCHLOROTHIAZIDE 12.5 MG/1
12.5 CAPSULE, GELATIN COATED ORAL EVERY MORNING
COMMUNITY

## 2021-10-07 RX ORDER — ALLOPURINOL 100 MG/1
100 TABLET ORAL DAILY
COMMUNITY

## 2021-10-07 RX ORDER — ASPIRIN 81 MG/1
81 TABLET, CHEWABLE ORAL DAILY
COMMUNITY

## 2021-10-07 RX ORDER — MONTELUKAST SODIUM 10 MG/1
10 TABLET ORAL NIGHTLY
COMMUNITY

## 2021-10-07 RX ORDER — OLMESARTAN MEDOXOMIL 40 MG/1
40 TABLET ORAL DAILY
COMMUNITY
End: 2021-11-29 | Stop reason: ALTCHOICE

## 2021-10-07 RX ADMIN — LIDOCAINE HYDROCHLORIDE 5 ML: 10 INJECTION, SOLUTION INFILTRATION; PERINEURAL at 10:47

## 2021-10-07 NOTE — PROGRESS NOTES
"Chief Complaint  Initial Evaluation of the Left Knee     Subjective      Abdi Gomez presents to [facility] for an evaluation of left knee. Patient has been having left knee pain for several months. He states it was well controlled by using topical voltaren gel and taking Ibuprofen. However, in the last 3 weeks he hasn’t been able to control his pain. He has a history of a right knee arthroplasty in 2013 by Dr. Francis. He states pain along the medial joint line.     Allergies   Allergen Reactions   • Hydrocodone Irritability   • Keflex [Cephalexin] Rash        Social History     Socioeconomic History   • Marital status:      Spouse name: Not on file   • Number of children: Not on file   • Years of education: Not on file   • Highest education level: Not on file   Tobacco Use   • Smoking status: Former Smoker        Review of Systems     Objective   Vital Signs:   Pulse 104   Ht 180.3 cm (71\")   Wt 127 kg (279 lb 6.4 oz)   SpO2 97%   BMI 38.97 kg/m²       Physical Exam  Constitutional:       Appearance: Normal appearance. Patient is well-developed and normal weight.   HENT:      Head: Normocephalic.      Right Ear: Hearing and external ear normal.      Left Ear: Hearing and external ear normal.      Nose: Nose normal.   Eyes:      Conjunctiva/sclera: Conjunctivae normal.   Cardiovascular:      Rate and Rhythm: Normal rate.   Pulmonary:      Effort: Pulmonary effort is normal.      Breath sounds: No wheezing or rales.   Abdominal:      Palpations: Abdomen is soft.      Tenderness: There is no abdominal tenderness.   Musculoskeletal:      Cervical back: Normal range of motion.   Skin:     Findings: No rash.   Neurological:      Mental Status: Patient is alert and oriented to person, place, and time.   Psychiatric:         Mood and Affect: Mood and affect normal.         Judgment: Judgment normal.       Ortho Exam      LEFT KNEE: Good strength to hamstrings, quadriceps, " dorsiflexors and plantar flexors. Stable to varus/valgus stress. Negative Lachman. Dorsal Pedal Pulse 2+, posterior tibialis pulse 2+. Calf supple, non-tender, no signs of DVT. No swelling, skin discoloration or atrophy. Tender medial and lateral joint line. Crepitus with motion. Skin intact. Full weight bearing. Limping gait.       Large Joint Arthrocentesis: L knee  Date/Time: 10/7/2021 10:47 AM  Consent given by: patient  Site marked: site marked  Timeout: Immediately prior to procedure a time out was called to verify the correct patient, procedure, equipment, support staff and site/side marked as required   Supporting Documentation  Indications: pain   Procedure Details  Location: knee - L knee  Needle gauge: 21G.  Medications administered: 5 mL lidocaine 1 %; 32 mg Triamcinolone Acetonide 32 MG  Patient tolerance: patient tolerated the procedure well with no immediate complications            Imaging Results (Most Recent)     Procedure Component Value Units Date/Time    XR Knee 3 View Left [[id number]] Resulted: 10/07/21 1006     Updated: 10/07/21 1006           Result Review :       X-Ray Report:  Left knee(s) X-Ray  Indication: Evaluation of left knee pain   AP and Lateral view(s)  Findings: Joint space collapse in all 3 compartments. Bone on bone osteoarthritis.   Prior studies available for comparison: no         Assessment and Plan     DX: Left knee osteoarthritis     He is a candidate for a left total knee arthroplasty. Operative vs non-operative measures discussed. He wishes to continue conservative management at this time. A left knee steroid injection and tolerated this well.     Call or return if worsening symptoms.    Follow Up     6 weeks.      Patient was given instructions and counseling regarding his condition or for health maintenance advice. Please see specific information pulled into the AVS if appropriate.     Scribed for Abi Francis MD by Shabana Gipson.  10/07/21   10:08 EDT        I  have personally performed the services described in this document as scribed by the above individual and it is both accurate and complete. Abi Francis MD 10/08/21

## 2021-10-08 RX ORDER — LIDOCAINE HYDROCHLORIDE 10 MG/ML
5 INJECTION, SOLUTION INFILTRATION; PERINEURAL
Status: COMPLETED | OUTPATIENT
Start: 2021-10-07 | End: 2021-10-07

## 2021-11-23 ENCOUNTER — OFFICE VISIT (OUTPATIENT)
Dept: ORTHOPEDIC SURGERY | Facility: CLINIC | Age: 76
End: 2021-11-23

## 2021-11-23 VITALS — HEART RATE: 94 BPM | OXYGEN SATURATION: 95 % | BODY MASS INDEX: 39.06 KG/M2 | WEIGHT: 279 LBS | HEIGHT: 71 IN

## 2021-11-23 DIAGNOSIS — M17.12 PRIMARY LOCALIZED OSTEOARTHROSIS OF LEFT LOWER LEG: Primary | ICD-10-CM

## 2021-11-23 PROCEDURE — 99212 OFFICE O/P EST SF 10 MIN: CPT | Performed by: PHYSICIAN ASSISTANT

## 2021-11-23 NOTE — PROGRESS NOTES
"Chief Complaint  Follow-up of the Left Knee    Subjective          Abdi Gomez presents to [facility] for follow-up on left knee pain.  Patient has had pain for several months that was well controlled using Voltaren ibuprofen.  Approximately 7 to 8 weeks ago patient had severe worsened pain, no known injury.  He received a steroid injection 10/7/2021 which she states completely resolved his pain.  He was having pain in the medial aspect but this has completely improved.  In fact he states he is able to climb ladders and clean out his gutters without difficulty.  He has a history of right knee arthroplasty in 2013 by Dr. Francis.    Objective   Allergies   Allergen Reactions   • Hydrocodone Irritability   • Keflex [Cephalexin] Rash       Vital Signs:   Pulse 94   Ht 180.3 cm (71\")   Wt 127 kg (279 lb)   SpO2 95%   BMI 38.91 kg/m²       Physical Exam  Constitutional:       Appearance: Normal appearance. Patient is well-developed and normal weight.   HENT:      Head: Normocephalic.      Right Ear: Hearing and external ear normal.      Left Ear: Hearing and external ear normal.      Nose: Nose normal.   Eyes:      Conjunctiva/sclera: Conjunctivae normal.   Cardiovascular:      Rate and Rhythm: Normal rate.   Pulmonary:      Effort: Pulmonary effort is normal.      Breath sounds: No wheezing or rales.   Abdominal:      Palpations: Abdomen is soft.      Tenderness: There is no abdominal tenderness.   Musculoskeletal:      Cervical back: Normal range of motion.   Skin:     Findings: No rash.   Neurological:      Mental Status: Patient is alert and oriented to person, place, and time.   Psychiatric:         Mood and Affect: Mood and affect normal.         Judgment: Judgment normal.     Ortho Exam  Left knee: Skin intact, no tenderness or swelling, extension 0 flexion 115, sensation light touch intact and posterior tib pulses 2+, good range of motion left ankle and digits, stable to " varus valgus stress.  Result Review :            Imaging Results (Most Recent)     None                Assessment and Plan    Problem List Items Addressed This Visit        Musculoskeletal and Injuries    Primary localized osteoarthrosis of left lower leg - Primary    Current Assessment & Plan     Patient had great relief following left knee steroid injection 10/7/2021.  States he has noticed increased range of motion and is able to tolerate activity better.  Patient will follow up as needed moving forward if he has any new or worsening symptoms.               Follow Up   Return if symptoms worsen or fail to improve.  Patient Instructions   Patient had great relief following left knee steroid injection 10/7/2021.  States he has noticed increased range of motion and is able to tolerate activity better.  Patient will follow up as needed moving forward if he has any new or worsening symptoms.    Patient was given instructions and counseling regarding his condition or for health maintenance advice. Please see specific information pulled into the AVS if appropriate.

## 2021-11-23 NOTE — PATIENT INSTRUCTIONS
Patient had great relief following left knee steroid injection 10/7/2021.  States he has noticed increased range of motion and is able to tolerate activity better.  Patient will follow up as needed moving forward if he has any new or worsening symptoms.

## 2021-11-29 ENCOUNTER — OFFICE VISIT (OUTPATIENT)
Dept: CARDIOLOGY | Facility: CLINIC | Age: 76
End: 2021-11-29

## 2021-11-29 VITALS
BODY MASS INDEX: 38.5 KG/M2 | HEIGHT: 71 IN | HEART RATE: 92 BPM | WEIGHT: 275 LBS | SYSTOLIC BLOOD PRESSURE: 146 MMHG | DIASTOLIC BLOOD PRESSURE: 88 MMHG

## 2021-11-29 DIAGNOSIS — I10 ESSENTIAL HYPERTENSION: ICD-10-CM

## 2021-11-29 DIAGNOSIS — I25.10 CORONARY ARTERY DISEASE INVOLVING NATIVE CORONARY ARTERY OF NATIVE HEART WITHOUT ANGINA PECTORIS: Primary | ICD-10-CM

## 2021-11-29 DIAGNOSIS — E78.2 MIXED HYPERLIPIDEMIA: ICD-10-CM

## 2021-11-29 PROCEDURE — 99214 OFFICE O/P EST MOD 30 MIN: CPT | Performed by: INTERNAL MEDICINE

## 2021-11-29 RX ORDER — AMOXICILLIN 500 MG
1200 CAPSULE ORAL DAILY
COMMUNITY

## 2021-11-29 RX ORDER — ALBUTEROL SULFATE 90 UG/1
2 AEROSOL, METERED RESPIRATORY (INHALATION) EVERY 6 HOURS PRN
COMMUNITY
Start: 2021-09-20

## 2021-11-29 RX ORDER — LOSARTAN POTASSIUM 50 MG/1
50 TABLET ORAL DAILY
COMMUNITY
End: 2022-05-31 | Stop reason: DRUGHIGH

## 2021-11-29 RX ORDER — FLUTICASONE PROPIONATE 50 MCG
SPRAY, SUSPENSION (ML) NASAL
COMMUNITY
Start: 2021-09-30

## 2021-11-29 NOTE — ASSESSMENT & PLAN NOTE
We will check the lipid panel now and adjust the dose of rosuvastatin if needed.  Continue rosuvastatin and Zetia for now at the current dose.

## 2021-11-29 NOTE — PROGRESS NOTES
CARDIOLOGY FOLLOW-UP PROGRESS NOTE        Chief Complaint  Coronary Artery Disease, Hyperlipidemia, and Hypertension    Subjective            Abdi Gomez presents to [facility]  History of Present Illness    This is a 76-year-old male with COPD, coronary disease, diabetes mellitus, hypertension, hyperlipidemia and sleep apnea.  He is here for 6-month follow-up visit.  He denies having any new complaints.  He feels short of breath on moderate exertion which is at his baseline.  Denies any chest pain, tightness or pressure.  No pedal edema or dizziness.  Denies palpitations.  He is taking all the medicines as prescribed.       Past History:    1) Coronary artery disease, status post angioplasty and stent placement in 1999 (details not available); 2) Hypertension; 3) Hyperlipidemia; 4) Diabetes mellitus; 5) Chronic sinus problems.     Medical History:  Past Medical History:   Diagnosis Date   • Asthma    • COPD (chronic obstructive pulmonary disease) (HCC)    • Coronary artery disease    • Diabetes mellitus (HCC)    • Hyperlipidemia    • Hypertension    • Sleep apnea        Surgical History: has a past surgical history that includes Coronary stent placement and Cardiac catheterization.     Family History: family history includes Cancer in his father; Heart failure in his mother.     Social History: reports that he quit smoking about 22 years ago. His smoking use included cigarettes. He started smoking about 58 years ago. He smoked 1.00 pack per day. He has never used smokeless tobacco. He reports that he does not drink alcohol and does not use drugs.    Allergies: Hydrocodone and Keflex [cephalexin]    Current Outpatient Medications on File Prior to Visit   Medication Sig   • allopurinol (ZYLOPRIM) 100 MG tablet Take 100 mg by mouth Daily.   • aspirin 81 MG chewable tablet Chew 81 mg Daily.   • cetirizine (zyrTEC) 10 MG tablet Take 10 mg by mouth Daily.   • ezetimibe (ZETIA) 10 MG  "tablet Take 1 tablet by mouth Every Other Day.   • famotidine (PEPCID) 20 MG tablet Take 20 mg by mouth 2 (Two) Times a Day.   • fluticasone (FLONASE) 50 MCG/ACT nasal spray INSTILL 2 PUFFS INTO EACH NOSTRIL ONCE DAILY   • Fluticasone-Salmeterol (WIXELA INHUB IN) Inhale 2 (Two) Times a Day.   • glipizide (GLUCOTROL XL) 2.5 MG 24 hr tablet Take 2.5 mg by mouth 2 (Two) Times a Day.   • hydroCHLOROthiazide (MICROZIDE) 12.5 MG capsule Take 12.5 mg by mouth Every Other Day.   • losartan (COZAAR) 50 MG tablet Take 50 mg by mouth Daily.   • montelukast (SINGULAIR) 10 MG tablet Take 10 mg by mouth Every Night.   • Omega-3 Fatty Acids (fish oil) 1200 MG capsule capsule Take 1,200 mg by mouth Daily.   • rosuvastatin (CRESTOR) 10 MG tablet TAKE 1 TABLET BY MOUTH EVERY OTHER DAY   • Ventolin  (90 Base) MCG/ACT inhaler Inhale 2 puffs Every 6 (Six) Hours As Needed.   • vitamin B-12 (CYANOCOBALAMIN) 1000 MCG tablet Take 1,000 mcg by mouth Daily.   • VITAMIN D, CHOLECALCIFEROL, PO Take  by mouth Daily.   • [DISCONTINUED] Fluticasone-Umeclidin-Vilant (Trelegy Ellipta) 100-62.5-25 MCG/INH inhaler Inhale 1 puff Daily.   • [DISCONTINUED] olmesartan (BENICAR) 40 MG tablet Take 40 mg by mouth Daily.     No current facility-administered medications on file prior to visit.          Review of Systems   Respiratory: Positive for shortness of breath. Negative for cough and wheezing.    Cardiovascular: Negative for chest pain, palpitations and leg swelling.   Gastrointestinal: Negative for nausea and vomiting.   Neurological: Negative for dizziness and syncope.        Objective     /88   Pulse 92   Ht 180.3 cm (71\")   Wt 125 kg (275 lb)   BMI 38.35 kg/m²       Physical Exam    General : Alert, awake, no acute distress  Neck : Supple, no carotid bruit, no jugular venous distention  CVS : Regular rate and rhythm, no murmur, rubs or gallops  Lungs: Clear to auscultation bilaterally, no crackles or rhonchi  Abdomen: Soft, " nontender, bowel sounds heard in all 4 quadrants  Extremities: Warm, well-perfused, no pedal edema    Result Review :     The following data was reviewed by: Sanford Nguyen MD on 11/29/2021:    CMP    CMP 2/25/21 9/7/21   Glucose 132 (A) 151 (A)   BUN 15 12   Creatinine 1.23 (A) 1.19   eGFR Non African Am  60 (A)   Sodium 138 137   Potassium 4.1 4.2   Chloride 102 101   Calcium 9.6 9.6   Albumin  4.40   Total Bilirubin  0.8   Alkaline Phosphatase  79   AST (SGOT)  16   ALT (SGPT)  9   (A) Abnormal value            CBC    CBC 2/25/21 9/7/21   WBC 11.12 (A) 8.78   RBC 5.26 5.20   Hemoglobin 15.4 15.7   Hematocrit 47.0 45.9   MCV 89.4 88.3   MCH 29.3 30.2   MCHC 32.8 (A) 34.2   RDW 13.3 13.0   Platelets 201 196   (A) Abnormal value                     Data reviewed: Cardiology studies        Echocardiogram done on 5/21/2019 showed    1.  Normal left ventricular systolic function with an estimated LV ejection fraction of 55-60%.  2.  No hemodynamically significant valvular abnormalities.  3.  No evidence of pulmonary artery hypertension.                  Assessment and Plan        Diagnoses and all orders for this visit:    1. Coronary artery disease involving native coronary artery of native heart without angina pectoris (Primary)  Assessment & Plan:  He is currently stable with no angina.  LV function is preserved.  Continue aspirin, statin at the current dose.  He is not on a beta-blocker since he is taking allergy shots.    Orders:  -     Lipid Panel; Future  -     Comprehensive Metabolic Panel; Future    2. Essential hypertension  Assessment & Plan:  Blood pressure reasonably well controlled.  Olmesartan was changed to losartan from [facility] recently.  Continue losartan and hydrochlorothiazide at the current dose.      3. Mixed hyperlipidemia  Assessment & Plan:  We will check the lipid panel now and adjust the dose of rosuvastatin if needed.  Continue rosuvastatin and Zetia for now at the current  dose.    Orders:  -     Lipid Panel; Future  -     Comprehensive Metabolic Panel; Future            Follow Up     Return in about 6 months (around 5/29/2022) for Next scheduled follow up with Brook BARUN .    Patient was given instructions and counseling regarding his condition or for health maintenance advice. Please see specific information pulled into the AVS if appropriate.

## 2021-11-29 NOTE — ASSESSMENT & PLAN NOTE
Blood pressure reasonably well controlled.  Olmesartan was changed to losartan from [facility] recently.  Continue losartan and hydrochlorothiazide at the current dose.

## 2021-11-29 NOTE — ASSESSMENT & PLAN NOTE
He is currently stable with no angina.  LV function is preserved.  Continue aspirin, statin at the current dose.  He is not on a beta-blocker since he is taking allergy shots.

## 2021-12-20 ENCOUNTER — PATIENT MESSAGE (OUTPATIENT)
Dept: CARDIOLOGY | Facility: CLINIC | Age: 76
End: 2021-12-20

## 2022-01-10 ENCOUNTER — PATIENT MESSAGE (OUTPATIENT)
Dept: CARDIOLOGY | Facility: CLINIC | Age: 77
End: 2022-01-10

## 2022-01-19 DIAGNOSIS — I25.10 CORONARY ARTERY DISEASE INVOLVING NATIVE CORONARY ARTERY OF NATIVE HEART WITHOUT ANGINA PECTORIS: ICD-10-CM

## 2022-01-19 DIAGNOSIS — E78.2 MIXED HYPERLIPIDEMIA: ICD-10-CM

## 2022-01-28 ENCOUNTER — PATIENT MESSAGE (OUTPATIENT)
Dept: CARDIOLOGY | Facility: CLINIC | Age: 77
End: 2022-01-28

## 2022-02-14 ENCOUNTER — TELEPHONE (OUTPATIENT)
Dept: ORTHOPEDIC SURGERY | Facility: CLINIC | Age: 77
End: 2022-02-14

## 2022-02-14 NOTE — TELEPHONE ENCOUNTER
Caller: JINA ARELLANO    Relationship to patient: [facility] ([facility])    Best call back number: [phone]    Chief complaint: LEFT KNEE PAIN    Type of visit: INJ    Requested date: AS SOON AS ELIGIBLE     If rescheduling, when is the original appointment:      Additional notes:

## 2022-02-22 ENCOUNTER — OFFICE VISIT (OUTPATIENT)
Dept: ORTHOPEDIC SURGERY | Facility: CLINIC | Age: 77
End: 2022-02-22

## 2022-02-22 VITALS — HEIGHT: 71 IN | WEIGHT: 270 LBS | BODY MASS INDEX: 37.8 KG/M2

## 2022-02-22 DIAGNOSIS — M17.12 PRIMARY OSTEOARTHRITIS OF LEFT KNEE: Primary | ICD-10-CM

## 2022-02-22 PROCEDURE — 20610 DRAIN/INJ JOINT/BURSA W/O US: CPT | Performed by: ORTHOPAEDIC SURGERY

## 2022-02-22 RX ORDER — TRIAMCINOLONE ACETONIDE 40 MG/ML
40 INJECTION, SUSPENSION INTRA-ARTICULAR; INTRAMUSCULAR
Status: COMPLETED | OUTPATIENT
Start: 2022-02-22 | End: 2022-02-22

## 2022-02-22 RX ORDER — BUPIVACAINE HYDROCHLORIDE 2.5 MG/ML
5 INJECTION, SOLUTION INFILTRATION; PERINEURAL
Status: COMPLETED | OUTPATIENT
Start: 2022-02-22 | End: 2022-02-22

## 2022-02-22 RX ADMIN — TRIAMCINOLONE ACETONIDE 40 MG: 40 INJECTION, SUSPENSION INTRA-ARTICULAR; INTRAMUSCULAR at 14:10

## 2022-02-22 RX ADMIN — BUPIVACAINE HYDROCHLORIDE 5 ML: 2.5 INJECTION, SOLUTION INFILTRATION; PERINEURAL at 14:10

## 2022-02-22 NOTE — PROGRESS NOTES
"Chief Complaint  Follow-up of the Left Knee     Subjective      Abdi Gomez presents to [facility] for a follow-up of left knee. Patient has left knee osteoarthritis. He gets injections in the knee that has given him relief of his pain. He hasn't had any recent injury or trauma. No new complaints today.     Allergies   Allergen Reactions   • Hydrocodone Irritability   • Keflex [Cephalexin] Rash        Social History     Socioeconomic History   • Marital status:    Tobacco Use   • Smoking status: Former Smoker     Packs/day: 1.00     Types: Cigarettes     Start date:      Quit date:      Years since quittin.1   • Smokeless tobacco: Never Used   Vaping Use   • Vaping Use: Never used   Substance and Sexual Activity   • Alcohol use: Never   • Drug use: Never   • Sexual activity: Defer        Review of Systems     Objective   Vital Signs:   Ht 180.3 cm (71\")   Wt 122 kg (270 lb)   BMI 37.66 kg/m²       Physical Exam  Constitutional:       Appearance: Normal appearance. Patient is well-developed and normal weight.   HENT:      Head: Normocephalic.      Right Ear: Hearing and external ear normal.      Left Ear: Hearing and external ear normal.      Nose: Nose normal.   Eyes:      Conjunctiva/sclera: Conjunctivae normal.   Cardiovascular:      Rate and Rhythm: Normal rate.   Pulmonary:      Effort: Pulmonary effort is normal.      Breath sounds: No wheezing or rales.   Abdominal:      Palpations: Abdomen is soft.      Tenderness: There is no abdominal tenderness.   Musculoskeletal:      Cervical back: Normal range of motion.   Skin:     Findings: No rash.   Neurological:      Mental Status: Patient is alert and oriented to person, place, and time.   Psychiatric:         Mood and Affect: Mood and affect normal.         Judgment: Judgment normal.       Ortho Exam      LEFT KNEE: Full extension. Flexion to 115 degrees. Good strength to hamstrings, quadriceps, " dorsiflexors and plantar flexors. Stable to varus/valgus stress. Stable anterior and posterior drawer. No swelling, skin discoloration or atrophy. Sensation grossly intact. Neurovascular intact.  Tender joint lines.       Large Joint Arthrocentesis: L knee  Date/Time: 2/22/2022 2:10 PM  Consent given by: patient  Site marked: site marked  Timeout: Immediately prior to procedure a time out was called to verify the correct patient, procedure, equipment, support staff and site/side marked as required   Supporting Documentation  Indications: pain   Procedure Details  Location: knee - L knee  Needle gauge: 21G.  Medications administered: 5 mL bupivacaine 0.25 %; 40 mg triamcinolone acetonide 40 MG/ML  Patient tolerance: patient tolerated the procedure well with no immediate complications            Imaging Results (Most Recent)     None           Result Review :         No results found.           Assessment and Plan     DX: Left knee osteoarthritis     Discussed treatment plans with the patient. A left knee steroid injection given, he tolerated this well.     Call or return if worsening symptoms.    Follow Up     PRN.       Patient was given instructions and counseling regarding his condition or for health maintenance advice. Please see specific information pulled into the AVS if appropriate.     Scribed for Abi Francis MD by Shabana Gipson.  02/22/22   14:07 EST        I have personally performed the services described in this document as scribed by the above individual and it is both accurate and complete. Abi Francis MD 02/22/22

## 2022-03-09 DIAGNOSIS — J44.9 CHRONIC OBSTRUCTIVE PULMONARY DISEASE, UNSPECIFIED COPD TYPE: Primary | ICD-10-CM

## 2022-03-10 ENCOUNTER — HOSPITAL ENCOUNTER (OUTPATIENT)
Dept: RESPIRATORY THERAPY | Facility: HOSPITAL | Age: 77
Discharge: HOME | End: 2022-03-10
Admitting: SPECIALIST

## 2022-03-10 DIAGNOSIS — J44.9 CHRONIC OBSTRUCTIVE PULMONARY DISEASE, UNSPECIFIED COPD TYPE: ICD-10-CM

## 2022-03-10 LAB
ARTERIAL PATENCY WRIST A: POSITIVE
BASE EXCESS BLDA CALC-SCNC: 0.6 MMOL/L (ref -2–2)
BDY SITE: ABNORMAL
COHGB MFR BLD: 3.7 % (ref 0–1.5)
FHHB: 3.7 % (ref 0–5)
HCO3 BLDA-SCNC: 23.2 MMOL/L (ref 22–26)
HGB BLDA-MCNC: 16.3 G/DL (ref 13.8–16.4)
LACTATE BLDA-SCNC: ABNORMAL MMOL/L
METHGB BLD QL: 0.2 % (ref 0–1.5)
MODALITY: ABNORMAL
OXYHGB MFR BLDV: 92.4 % (ref 94–99)
PCO2 BLDA: 32.3 MM HG (ref 35–45)
PH BLDA: 7.47 PH UNITS (ref 7.35–7.45)
PO2 BLDA: 79.8 MM HG (ref 80–100)
SAO2 % BLDCOA: 96.1 % (ref 95–99)

## 2022-03-10 PROCEDURE — 94060 EVALUATION OF WHEEZING: CPT

## 2022-03-10 PROCEDURE — 94726 PLETHYSMOGRAPHY LUNG VOLUMES: CPT

## 2022-03-10 PROCEDURE — 94726 PLETHYSMOGRAPHY LUNG VOLUMES: CPT | Performed by: INTERNAL MEDICINE

## 2022-03-10 PROCEDURE — 94729 DIFFUSING CAPACITY: CPT | Performed by: INTERNAL MEDICINE

## 2022-03-10 PROCEDURE — 82805 BLOOD GASES W/O2 SATURATION: CPT | Performed by: SPECIALIST

## 2022-03-10 PROCEDURE — 94729 DIFFUSING CAPACITY: CPT

## 2022-03-10 PROCEDURE — 83050 HGB METHEMOGLOBIN QUAN: CPT | Performed by: SPECIALIST

## 2022-03-10 PROCEDURE — 82375 ASSAY CARBOXYHB QUANT: CPT | Performed by: SPECIALIST

## 2022-03-10 PROCEDURE — 94060 EVALUATION OF WHEEZING: CPT | Performed by: INTERNAL MEDICINE

## 2022-03-10 PROCEDURE — 36600 WITHDRAWAL OF ARTERIAL BLOOD: CPT | Performed by: SPECIALIST

## 2022-03-10 RX ORDER — ALBUTEROL SULFATE 2.5 MG/3ML
2.5 SOLUTION RESPIRATORY (INHALATION) ONCE
Status: COMPLETED | OUTPATIENT
Start: 2022-03-10 | End: 2022-03-10

## 2022-03-10 RX ADMIN — ALBUTEROL SULFATE 2.5 MG: 2.5 SOLUTION RESPIRATORY (INHALATION) at 14:19

## 2022-03-16 ENCOUNTER — TRANSCRIBE ORDERS (OUTPATIENT)
Dept: ADMINISTRATIVE | Facility: HOSPITAL | Age: 77
End: 2022-03-16

## 2022-03-16 DIAGNOSIS — J32.9 CHRONIC SINUSITIS, UNSPECIFIED LOCATION: Primary | ICD-10-CM

## 2022-04-06 ENCOUNTER — HOSPITAL ENCOUNTER (OUTPATIENT)
Dept: CT IMAGING | Facility: HOSPITAL | Age: 77
Discharge: HOME | End: 2022-04-06
Admitting: OTOLARYNGOLOGY

## 2022-04-06 DIAGNOSIS — J32.9 CHRONIC SINUSITIS, UNSPECIFIED LOCATION: ICD-10-CM

## 2022-04-06 PROCEDURE — 70486 CT MAXILLOFACIAL W/O DYE: CPT

## 2022-04-12 ENCOUNTER — OFFICE VISIT (OUTPATIENT)
Dept: PULMONOLOGY | Facility: CLINIC | Age: 77
End: 2022-04-12

## 2022-04-12 VITALS
HEART RATE: 86 BPM | TEMPERATURE: 98.7 F | HEIGHT: 71 IN | RESPIRATION RATE: 16 BRPM | DIASTOLIC BLOOD PRESSURE: 91 MMHG | BODY MASS INDEX: 40.08 KG/M2 | WEIGHT: 286.3 LBS | SYSTOLIC BLOOD PRESSURE: 165 MMHG | OXYGEN SATURATION: 97 %

## 2022-04-12 DIAGNOSIS — F17.201 TOBACCO ABUSE, IN REMISSION: ICD-10-CM

## 2022-04-12 DIAGNOSIS — J84.10 PULMONARY FIBROSIS: Primary | ICD-10-CM

## 2022-04-12 DIAGNOSIS — E66.9 OBESITY (BMI 30-39.9): ICD-10-CM

## 2022-04-12 PROCEDURE — 99214 OFFICE O/P EST MOD 30 MIN: CPT | Performed by: INTERNAL MEDICINE

## 2022-04-12 NOTE — PROGRESS NOTES
Primary Care Provider  Jaimie Stallworth APRN   Referring Provider  No ref. provider found      Patient Complaint  Follow-up (1 year follow up) and Shortness of Breath (With ambulation)      Subjective          Abdi Gomez presents to [facility]      History of Presenting Illness  Abdi Gomez is a 77 y.o. 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  Since January he has had progressive worsening shortness of breath.  He states his insurance changed and [facility]  Instead of Trelegy he is on Wixela only.  He also has gained about 25 pounds over the last few months.  PFTs were done showing no change in FEV1 and diffuse capacity but decreased total lung capacity foresaw capacity consistent with increased weight gain versus worsening restriction/pulmonary fibrosis.  He now get short of breath walking about 4 to 500 feet.  His dyspnea is moderate severity, worse with activity and relieved with rest.  Otherwise the patient is been doing well denies any complaints.  He does state that when he leans forward the air gets cut out from him and also he has new orthopnea.  Denies any leg swelling or PND.  He denies coughing, wheezing, headaches, chest pain, weight loss or hemoptysis. Denies fevers, chills and night sweats. He is able to perform ADLs without difficulties and denies any swollen glands/lymph nodes in the head or neck.    I have personally reviewed the review of systems, past family, social, medical and surgical histories; and agree with their findings.      Review of Systems  Constitutional symptoms:  Denied complaints   Ear, nose, throat: Denied complaints  Cardiovascular: Orthopnea, otherwise Denied complaints  Respiratory: Dyspnea, otherwise denied complaints  Gastrointestinal: Denied complaints  Musculoskeletal: Denied complaints        Family History   Problem Relation Age of Onset    • Heart failure Mother    • Cancer Father         Passed with lung cancer        Social History     Socioeconomic History   • Marital status:    Tobacco Use   • Smoking status: Former Smoker     Packs/day: 1.00     Types: Cigarettes     Start date: 1963     Quit date: 1999     Years since quittin.2   • Smokeless tobacco: Never Used   Vaping Use   • Vaping Use: Never used   Substance and Sexual Activity   • Alcohol use: Never   • Drug use: Never   • Sexual activity: Not Currently     Partners: Female        Past Medical History:   Diagnosis Date   • Asthma    • COPD (chronic obstructive pulmonary disease) ([facility])    • Coronary artery disease    • Diabetes mellitus ([facility])    • Hyperlipidemia    • Hypertension    • Pneumonia    • Primary central sleep apnea    • Sleep apnea    • Sleep apnea, obstructive         Immunization History   Administered Date(s) Administered   • COVID-19 (DOREEN) 2021   • Influenza LAIV (Nasal) 2018, 10/02/2019   • Pneumococcal Polysaccharide (PPSV23) 2013         Allergies   Allergen Reactions   • Hydrocodone Irritability   • Keflex [Cephalexin] Rash          Current Outpatient Medications:   •  allopurinol (ZYLOPRIM) 100 MG tablet, Take 100 mg by mouth Daily., Disp: , Rfl:   •  aspirin 81 MG chewable tablet, Chew 81 mg Daily., Disp: , Rfl:   •  cetirizine (zyrTEC) 10 MG tablet, Take 10 mg by mouth Daily., Disp: , Rfl:   •  ezetimibe (ZETIA) 10 MG tablet, Take 1 tablet by mouth Every Other Day., Disp: 15 tablet, Rfl: 6  •  famotidine (PEPCID) 20 MG tablet, Take 20 mg by mouth 2 (Two) Times a Day., Disp: , Rfl:   •  fluticasone (FLONASE) 50 MCG/ACT nasal spray, INSTILL 2 PUFFS INTO EACH NOSTRIL ONCE DAILY, Disp: , Rfl:   •  Fluticasone-Salmeterol (WIXELA INHUB IN), Inhale 2 (Two) Times a Day., Disp: , Rfl:   •  glipizide (GLUCOTROL XL) 2.5 MG 24 hr tablet, Take 2.5 mg by mouth 2 (Two) Times a Day., Disp: , Rfl:   •  hydroCHLOROthiazide (MICROZIDE) 12.5  "MG capsule, Take 12.5 mg by mouth Every Other Day., Disp: , Rfl:   •  losartan (COZAAR) 50 MG tablet, Take 50 mg by mouth Daily., Disp: , Rfl:   •  montelukast (SINGULAIR) 10 MG tablet, Take 10 mg by mouth Every Night., Disp: , Rfl:   •  Omega-3 Fatty Acids (fish oil) 1200 MG capsule capsule, Take 1,200 mg by mouth Daily., Disp: , Rfl:   •  rosuvastatin (CRESTOR) 10 MG tablet, TAKE 1 TABLET BY MOUTH EVERY OTHER DAY, Disp: 15 tablet, Rfl: 7  •  Ventolin  (90 Base) MCG/ACT inhaler, Inhale 2 puffs Every 6 (Six) Hours As Needed., Disp: , Rfl:   •  vitamin B-12 (CYANOCOBALAMIN) 1000 MCG tablet, Take 1,000 mcg by mouth Daily., Disp: , Rfl:   •  VITAMIN D, CHOLECALCIFEROL, PO, Take  by mouth Daily., Disp: , Rfl:   •  tiotropium bromide monohydrate (SPIRIVA RESPIMAT) 2.5 MCG/ACT aerosol solution inhaler, Inhale 2 puffs Daily., Disp: 1 each, Rfl: 11    Current Facility-Administered Medications:   •  pneumococcal conj. 13-valent (PREVNAR-13) vaccine 0.5 mL, 0.5 mL, Intramuscular, Once, James Jensen MD         Objective     Vital Signs:   /91 (BP Location: Right arm, Patient Position: Sitting, Cuff Size: Large Adult)   Pulse 86   Temp 98.7 °F (37.1 °C) (Tympanic)   Resp 16   Ht 180.3 cm (71\")   Wt 130 kg (286 lb 4.8 oz)   SpO2 97%   BMI 39.93 kg/m²     Physical Exam  Vital Signs Reviewed   WDWN, Alert, NAD.    HEENT:  PERRL, EOMI.  OP, nares clear  Neck:  Supple, no JVD, no thyromegaly  Chest:  good aeration, rhonchi Velcro-like crackles at bases bilaterally, tympanic to percussion bilaterally, no work of breathing noted  CV: RRR, no MGR, pulses 2+, equal.  Abd:  Soft, NT, ND, + BS, no HSM, obese  EXT:  no clubbing, no cyanosis, no edema  Neuro:  A&Ox3, CN grossly intact, no focal deficits.  Skin: No rashes or lesions noted       Result Review :   I have personally reviewed our last office notes.  Also pursued PFTs showing restrictive lung defect.  Compared to 2019 FEV1 and diffusing capacity are " unchanged but FVC and total lung capacity has significantly dropped.  CBC and CMP with no peripheral eosinophilia and no evidence of chronic hypercapnic respiratory failure.             Assessment and Plan      Patient Active Problem List   Diagnosis   • Primary localized osteoarthrosis of left lower leg   • Coronary artery disease involving native coronary artery of native heart without angina pectoris   • Essential hypertension   • Mixed hyperlipidemia   • Obesity (BMI 30-39.9)   • Tobacco abuse, in remission   • Pulmonary fibrosis (HCC)       Impression:  Chronic dyspnea, slowly worsening.  Question secondary to worsening pulmonary fibrosis versus weight gain versus patient no longer being on triple inhaler therapy  Combined pulmonary fibrosis with emphysema.  FEV1 61% predicted.  Has worsening symptoms since his pharmacy dropped his LAMA.  He needs to have this added again.  COPD assessment test score is 20 signifying poor disease control  Obesity with BMI 39.9  Occupational exposures to multiple chemicals and inhalants  Tobacco use of cigarettes in remission    Plan:  I reviewed the patient's PFTs and I think his weight gain is likely the culprit for his worsening dyspnea.  Other considerations are the patient is no longer on a LAMA and would benefit from being on triple inhaler therapy.  We will also need to make sure that his pulmonary fibrosis has not worsened.  Check high-resolution noncontrast chest CT now  Continue Wixela and add Spiriva Respimat 2.5 mcg 2 puffs daily.  Inhaler education provided today.  Continue Singulair and Zyrtec  Diet and exercise counseling provided today.  Recommended 30 minutes daily exercise well is an 1800 -calorie/day diet.  Patient verbalized understanding.  Total time counseling the patient today was 4 minutes  Smoking status: Former smoker.  Quit smoking in 1999  Vaccination status: Up-to-date with flu, Pneumovax and COVID-19 vaccinations.  We will give Prevnar  today  Medications personally reviewed    Follow Up   Return in about 3 months (around 7/12/2022).  Patient was given instructions and counseling regarding his condition or for health maintenance advice. Please see specific information pulled into the AVS if appropriate.     Electronically signed by James Jensen MD, 04/12/22, 3:45 PM EDT.

## 2022-05-03 ENCOUNTER — HOSPITAL ENCOUNTER (OUTPATIENT)
Dept: CT IMAGING | Facility: HOSPITAL | Age: 77
Discharge: HOME | End: 2022-05-03
Admitting: INTERNAL MEDICINE

## 2022-05-03 DIAGNOSIS — J84.10 PULMONARY FIBROSIS: ICD-10-CM

## 2022-05-03 PROCEDURE — 71250 CT THORAX DX C-: CPT

## 2022-05-20 RX ORDER — TIOTROPIUM BROMIDE INHALATION SPRAY 3.12 UG/1
2 SPRAY, METERED RESPIRATORY (INHALATION)
Qty: 2 EACH | Refills: 0 | COMMUNITY
Start: 2022-05-20

## 2022-05-25 PROBLEM — I25.10 CORONARY ARTERY DISEASE INVOLVING NATIVE CORONARY ARTERY OF NATIVE HEART WITHOUT ANGINA PECTORIS: Chronic | Status: ACTIVE | Noted: 2021-11-29

## 2022-05-25 NOTE — PROGRESS NOTES
Chief Complaint  Coronary Artery Disease, Hypertension, Hyperlipidemia, and Shortness of Breath    Subjective        Abdi Gomez presents to [facility] CARDIOLOG[facility]  He is a 77-year-old white male who comes in to evaluate his coronary disease, hypertension and hyperlipidemia.  Complains of shortness of breath.  Seems like it is worse lately.  He recently went to the pulmonologist and was told his shortness of breath was due to his weight gain.  He is up 11 pound since he was last here.  He is also unable to get any exercise due to this of back problems.  Also complains of some pedal edema recently from having his feet in a dependent position while he sitting in his chair. Denies chest pain, shortness  palpitations,  dizziness, syncope, PND, and orthopnea.        Past History:    Past Medical History:   Diagnosis Date   • Asthma    • COPD (chronic obstructive pulmonary disease) (HCC)    • Coronary artery disease    • Coronary artery disease involving native coronary artery of native heart without angina pectoris 11/29/2021     Status post angioplasty and stent placement in 1999 (details not available)   • Diabetes mellitus (HCC)    • Hyperlipidemia    • Hypertension    • Pneumonia 2019   • Primary central sleep apnea    • Sleep apnea    • Sleep apnea, obstructive         Family History: family history includes Cancer in his father; Heart failure in his mother.     Social History: reports that he quit smoking about 23 years ago. His smoking use included cigarettes. He started smoking about 59 years ago. He has a 60.00 pack-year smoking history. He has never used smokeless tobacco. He reports that he does not drink alcohol and does not use drugs.    Allergies: Hydrocodone and Keflex [cephalexin]    Past Surgical History:   Procedure Laterality Date   • CARDIAC CATHETERIZATION     • CORONARY STENT PLACEMENT            Current Outpatient Medications:   •  allopurinol (ZYLOPRIM) 100 MG tablet, Take  100 mg by mouth Daily., Disp: , Rfl:   •  aspirin 81 MG chewable tablet, Chew 81 mg Daily., Disp: , Rfl:   •  cetirizine (zyrTEC) 10 MG tablet, Take 10 mg by mouth Daily., Disp: , Rfl:   •  ezetimibe (ZETIA) 10 MG tablet, Take 10 mg by mouth Daily., Disp: , Rfl:   •  famotidine (PEPCID) 20 MG tablet, Take 20 mg by mouth 2 (Two) Times a Day., Disp: , Rfl:   •  fluticasone (FLONASE) 50 MCG/ACT nasal spray, INSTILL 2 PUFFS INTO EACH NOSTRIL ONCE DAILY, Disp: , Rfl:   •  Fluticasone-Salmeterol (WIXELA INHUB IN), Inhale 2 (Two) Times a Day., Disp: , Rfl:   •  glipizide (GLUCOTROL XL) 2.5 MG 24 hr tablet, Take 2.5 mg by mouth 2 (Two) Times a Day., Disp: , Rfl:   •  hydroCHLOROthiazide (MICROZIDE) 12.5 MG capsule, Take 12.5 mg by mouth Every Other Day., Disp: , Rfl:   •  montelukast (SINGULAIR) 10 MG tablet, Take 10 mg by mouth Every Night., Disp: , Rfl:   •  Omega-3 Fatty Acids (fish oil) 1200 MG capsule capsule, Take 1,200 mg by mouth Daily., Disp: , Rfl:   •  rosuvastatin (CRESTOR) 20 MG tablet, Take 10 mg by mouth 2 (Two) Times a Day., Disp: , Rfl:   •  tiotropium bromide monohydrate (Spiriva Respimat) 2.5 MCG/ACT aerosol solution inhaler, Inhale 2 puffs Daily., Disp: 2 each, Rfl: 0  •  Ventolin  (90 Base) MCG/ACT inhaler, Inhale 2 puffs Every 6 (Six) Hours As Needed., Disp: , Rfl:   •  vitamin B-12 (CYANOCOBALAMIN) 1000 MCG tablet, Take 1,000 mcg by mouth Daily., Disp: , Rfl:   •  VITAMIN D, CHOLECALCIFEROL, PO, Take  by mouth Daily., Disp: , Rfl:   •  losartan (Cozaar) 100 MG tablet, Take 1 tablet by mouth Daily., Disp: 90 tablet, Rfl: 3     Medications Discontinued During This Encounter   Medication Reason   • tiotropium bromide monohydrate (SPIRIVA RESPIMAT) 2.5 MCG/ACT aerosol solution inhaler Duplicate order   • rosuvastatin (CRESTOR) 10 MG tablet Discontinued by another clinician   • ezetimibe (ZETIA) 10 MG tablet Duplicate order   • losartan (COZAAR) 50 MG tablet Dose adjustment        Review of Systems  "  Constitutional: Negative for fatigue.   Respiratory: Positive for shortness of breath. Negative for cough.    Cardiovascular: Positive for leg swelling. Negative for chest pain and palpitations.   Neurological: Negative for dizziness.   All other systems reviewed and are negative.       Objective     Physical Exam  Constitutional:       General: He is not in acute distress.     Appearance: He is obese.   Neck:      Vascular: No carotid bruit.   Cardiovascular:      Rate and Rhythm: Normal rate and regular rhythm.      Heart sounds: Normal heart sounds.   Pulmonary:      Effort: Pulmonary effort is normal.      Breath sounds: Normal breath sounds.      Comments: Increase AP diameter  Abdominal:      Comments: Central obesity   Musculoskeletal:      Right lower leg: No edema.      Left lower leg: No edema.   Neurological:      Mental Status: He is alert.       /82   Pulse 94   Ht 182.9 cm (72\")   Wt 130 kg (286 lb)   BMI 38.79 kg/m²       Vitals:    05/31/22 0917   BP: 152/82   Pulse: 94       Result Review :         The following data was reviewed by: APARNA Barbosa on 05/31/2022:        CMP    CMP 9/7/21   Glucose 151 (A)   BUN 12   Creatinine 1.19   eGFR Non African Am 60 (A)   Sodium 137   Potassium 4.2   Chloride 101   Calcium 9.6   Albumin 4.40   Total Bilirubin 0.8   Alkaline Phosphatase 79   AST (SGOT) 16   ALT (SGPT) 9   (A) Abnormal value            CBC w/diff    CBC w/Diff 9/7/21   WBC 8.78   RBC 5.20   Hemoglobin 15.7   Hematocrit 45.9   MCV 88.3   MCH 30.2   MCHC 34.2   RDW 13.0   Platelets 196   Neutrophil Rel % 63.5   Immature Granulocyte Rel % 0.5   Lymphocyte Rel % 24.8   Monocyte Rel % 5.5   Eosinophil Rel % 5.2   Basophil Rel % 0.5                 Labs from PCP on February 28, 2022 hemoglobin A1c 6.8, BUN 13, creatinine is 1.0, total cholesterol is 128, HDL 45, triglycerides 131, LDL 56           Assessment and Plan    Diagnoses and all orders for this visit:    1. Coronary " artery disease involving native coronary artery of native heart without angina pectoris (Primary)  Assessment & Plan:  Without angina.  Continue aspirin 81 mg.      2. Essential hypertension  Assessment & Plan:  Needs tighter control.  Increase losartan to 100 mg a day.  Continue hydrochlorothiazide 12.5 every other day.  He will do a blood pressure log and if still elevated will consider increasing his hydrochlorothiazide.    Orders:  -     losartan (Cozaar) 100 MG tablet; Take 1 tablet by mouth Daily.  Dispense: 90 tablet; Refill: 3    3. Mixed hyperlipidemia  Assessment & Plan:  At goal.  Continue rosuvastatin 20 mg and Zetia 10 mg.      4. Shortness of breath  Assessment & Plan:  Increasing shortness of breath.  We will do an echocardiogram to evaluate.    Orders:  -     Adult Transthoracic Echo Complete W/ Cont if Necessary Per Protocol; Future          Follow Up     Return in about 9 months (around 2/28/2023) for with Dr. Nguyen.    Patient was given instructions and counseling regarding his condition or for health maintenance advice. Please see specific information pulled into the AVS if appropriate.       APARNA Russo  05/31/22 07:22 EDT

## 2022-05-31 ENCOUNTER — OFFICE VISIT (OUTPATIENT)
Dept: CARDIOLOGY | Facility: CLINIC | Age: 77
End: 2022-05-31

## 2022-05-31 VITALS
BODY MASS INDEX: 38.74 KG/M2 | HEART RATE: 94 BPM | SYSTOLIC BLOOD PRESSURE: 152 MMHG | WEIGHT: 286 LBS | HEIGHT: 72 IN | DIASTOLIC BLOOD PRESSURE: 82 MMHG

## 2022-05-31 DIAGNOSIS — R06.02 SHORTNESS OF BREATH: Chronic | ICD-10-CM

## 2022-05-31 DIAGNOSIS — E78.2 MIXED HYPERLIPIDEMIA: Chronic | ICD-10-CM

## 2022-05-31 DIAGNOSIS — I10 ESSENTIAL HYPERTENSION: Chronic | ICD-10-CM

## 2022-05-31 DIAGNOSIS — I25.10 CORONARY ARTERY DISEASE INVOLVING NATIVE CORONARY ARTERY OF NATIVE HEART WITHOUT ANGINA PECTORIS: Primary | Chronic | ICD-10-CM

## 2022-05-31 PROCEDURE — 99214 OFFICE O/P EST MOD 30 MIN: CPT | Performed by: NURSE PRACTITIONER

## 2022-05-31 RX ORDER — EZETIMIBE 10 MG/1
10 TABLET ORAL DAILY
COMMUNITY

## 2022-05-31 RX ORDER — LOSARTAN POTASSIUM 100 MG/1
100 TABLET ORAL DAILY
Qty: 90 TABLET | Refills: 3 | Status: SHIPPED | OUTPATIENT
Start: 2022-05-31 | End: 2023-03-06 | Stop reason: SDUPTHER

## 2022-05-31 RX ORDER — ROSUVASTATIN CALCIUM 10 MG/1
10 TABLET, COATED ORAL DAILY
COMMUNITY
End: 2023-03-06 | Stop reason: SINTOL

## 2022-05-31 NOTE — PATIENT INSTRUCTIONS
Increase losartan 50 mg to 2 tablets a day until your bottle is done and then I will send in a prescription for the new dosage of 100 mg 1 tablet a day

## 2022-05-31 NOTE — ASSESSMENT & PLAN NOTE
Needs tighter control.  Increase losartan to 100 mg a day.  Continue hydrochlorothiazide 12.5 every other day.  He will do a blood pressure log and if still elevated will consider increasing his hydrochlorothiazide.

## 2022-06-02 DIAGNOSIS — E78.2 MIXED HYPERLIPIDEMIA: ICD-10-CM

## 2022-06-02 DIAGNOSIS — I25.10 CORONARY ARTERY DISEASE INVOLVING NATIVE CORONARY ARTERY OF NATIVE HEART WITHOUT ANGINA PECTORIS: ICD-10-CM

## 2022-06-16 ENCOUNTER — TELEPHONE (OUTPATIENT)
Dept: CARDIOLOGY | Facility: CLINIC | Age: 77
End: 2022-06-16

## 2022-07-12 ENCOUNTER — OFFICE VISIT (OUTPATIENT)
Dept: PULMONOLOGY | Facility: CLINIC | Age: 77
End: 2022-07-12

## 2022-07-12 VITALS
DIASTOLIC BLOOD PRESSURE: 77 MMHG | RESPIRATION RATE: 20 BRPM | WEIGHT: 279.5 LBS | HEIGHT: 71 IN | TEMPERATURE: 98.2 F | SYSTOLIC BLOOD PRESSURE: 136 MMHG | BODY MASS INDEX: 39.13 KG/M2 | HEART RATE: 103 BPM | OXYGEN SATURATION: 96 %

## 2022-07-12 DIAGNOSIS — F17.201 TOBACCO ABUSE, IN REMISSION: ICD-10-CM

## 2022-07-12 DIAGNOSIS — R06.00 DYSPNEA, UNSPECIFIED TYPE: ICD-10-CM

## 2022-07-12 DIAGNOSIS — J43.9 PULMONARY EMPHYSEMA, UNSPECIFIED EMPHYSEMA TYPE: ICD-10-CM

## 2022-07-12 DIAGNOSIS — J84.10 PULMONARY FIBROSIS: Primary | ICD-10-CM

## 2022-07-12 DIAGNOSIS — G47.33 OSA (OBSTRUCTIVE SLEEP APNEA): ICD-10-CM

## 2022-07-12 PROCEDURE — 99214 OFFICE O/P EST MOD 30 MIN: CPT | Performed by: NURSE PRACTITIONER

## 2022-07-12 NOTE — PROGRESS NOTES
Primary Care Provider  Jaimie Stallworth APRN     Referring Provider  No ref. provider found     Chief Complaint  Pulmonary fibrosis, Shortness of Breath, and Follow-up (3 month follow up)    Subjective          History of Presenting Illness  Patient is a 77 y.o. 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  Patient states that since last visit his breathing is at baseline.  Patient states that he does get short of breath that is worse with exertion and when walking and experiencing hip pain, moderate in severity, and improved with rest.  Patient states that he is taking Wixela and Spiriva every day as prescribed and uses albuterol inhaler as needed.  [facility]  Patient states that he also has obstructive sleep apnea and [facility].  Patient denies any morning headaches or excessive daytime sleepiness.  Patient states that he is not had take any antibiotics or steroids since last office visit and denies any hospitalizations since last office visit.  Patient had a high-resolution chest CT scan completed on 5/3/2022.  Report states stable CT of the chest.  There is unchanged mild pulmonary fibrosis in the lung bases and unchanged upper lung predominant emphysema.  No evidence of new or increasing interstitial lung disease.  No evidence of acute cardiopulmonary process. Patient denies fever, chills, night sweats, swollen glands in the head and neck, unintentional weight loss, hemoptysis, purulent sputum production, dysphagia, chest pain, palpitations, chest tightness, abdominal pain, nausea, vomiting, and diarrhea.  Patient also denies any myalgias, changes in sense of taste and/or smell, sore throat, any other coronavirus or flu-like symptoms.  Patient denies any leg swelling, orthopnea, paroxysmal nocturnal dyspnea.  Patient is able to perform activities of daily living.        Review of Systems   Constitutional:  Negative for activity change, appetite change, chills, diaphoresis, fatigue, fever, unexpected weight gain and unexpected weight loss.        Negative for Insomnia   HENT: Negative for congestion (Nasal), mouth sores, nosebleeds, postnasal drip, sore throat, swollen glands and trouble swallowing.         Negative for Thrush  Negative for Hoarseness  Negative for Allergies/Hay Fever  Negative for Recent Head injury  Negative for Ear Fullness  Negative for Nasal or Sinus pain  Negative for Dry lips  Negative for Nasal discharge   Respiratory: Positive for shortness of breath. Negative for apnea, cough, chest tightness and wheezing.         Negative for Hemoptysis  Negative for Pleuritic pain   Cardiovascular: Negative for chest pain, palpitations and leg swelling.        Negative for Claudication  Negative for Cyanosis  Negative for Dyspnea on exertion   Gastrointestinal: Negative for abdominal pain, diarrhea, nausea, vomiting and GERD.   Musculoskeletal: Negative for joint swelling and myalgias.        Negative for Joint pain  Negative for Joint stiffness   Skin: Negative for color change, dry skin, pallor and rash.   Neurological: Negative for syncope, weakness and headache.   Hematological: Negative for adenopathy. Does not bruise/bleed easily.        Family History   Problem Relation Age of Onset   • Heart failure Mother    • Cancer Father         Passed with lung cancer        Social History     Socioeconomic History   • Marital status:    Tobacco Use   • Smoking status: Former Smoker     Packs/day: 1.50     Years: 40.00     Pack years: 60.00     Types: Cigarettes     Start date: 1963     Quit date: 1999     Years since quittin.5   • Smokeless tobacco: Never Used   Vaping Use   • Vaping Use: Never used   Substance and Sexual Activity   • Alcohol use: Never   • Drug use: Never   • Sexual activity: Not Currently     Partners: Female        Past Medical History:   Diagnosis Date   • Asthma    •  COPD (chronic obstructive pulmonary disease) ([facility])    • Coronary artery disease    • Coronary artery disease involving native coronary artery of native heart without angina pectoris 11/29/2021     Status post angioplasty and stent placement in 1999 (details not available)   • Diabetes mellitus ([facility])    • Hyperlipidemia    • Hypertension    • Pneumonia 2019   • Primary central sleep apnea    • Sleep apnea    • Sleep apnea, obstructive         Immunization History   Administered Date(s) Administered   • COVID-19 (DOREEN) 04/08/2021   • Influenza LAIV (Nasal) 11/06/2018, 10/02/2019   • Pneumococcal Conjugate 13-Valent (PCV13) 04/12/2022   • Pneumococcal Polysaccharide (PPSV23) 11/20/2013       Allergies   Allergen Reactions   • Hydrocodone Irritability   • Keflex [Cephalexin] Rash          Current Outpatient Medications:   •  allopurinol (ZYLOPRIM) 100 MG tablet, Take 100 mg by mouth Daily., Disp: , Rfl:   •  aspirin 81 MG chewable tablet, Chew 81 mg Daily., Disp: , Rfl:   •  cetirizine (zyrTEC) 10 MG tablet, Take 10 mg by mouth Daily., Disp: , Rfl:   •  ezetimibe (ZETIA) 10 MG tablet, Take 10 mg by mouth Daily., Disp: , Rfl:   •  famotidine (PEPCID) 20 MG tablet, Take 20 mg by mouth 2 (Two) Times a Day., Disp: , Rfl:   •  fluticasone (FLONASE) 50 MCG/ACT nasal spray, INSTILL 2 PUFFS INTO EACH NOSTRIL ONCE DAILY, Disp: , Rfl:   •  Fluticasone-Salmeterol (WIXELA INHUB IN), Inhale 2 (Two) Times a Day., Disp: , Rfl:   •  glipizide (GLUCOTROL XL) 2.5 MG 24 hr tablet, Take 2.5 mg by mouth 2 (Two) Times a Day., Disp: , Rfl:   •  hydroCHLOROthiazide (MICROZIDE) 12.5 MG capsule, Take 12.5 mg by mouth Every Other Day., Disp: , Rfl:   •  losartan (Cozaar) 100 MG tablet, Take 1 tablet by mouth Daily., Disp: 90 tablet, Rfl: 3  •  montelukast (SINGULAIR) 10 MG tablet, Take 10 mg by mouth Every Night., Disp: , Rfl:   •  Omega-3 Fatty Acids (fish oil) 1200 MG capsule capsule, Take 1,200 mg by mouth Daily., Disp: , Rfl:   •   "rosuvastatin (CRESTOR) 20 MG tablet, Take 10 mg by mouth 2 (Two) Times a Day., Disp: , Rfl:   •  tiotropium bromide monohydrate (Spiriva Respimat) 2.5 MCG/ACT aerosol solution inhaler, Inhale 2 puffs Daily., Disp: 2 each, Rfl: 0  •  Ventolin  (90 Base) MCG/ACT inhaler, Inhale 2 puffs Every 6 (Six) Hours As Needed., Disp: , Rfl:   •  vitamin B-12 (CYANOCOBALAMIN) 1000 MCG tablet, Take 1,000 mcg by mouth Daily., Disp: , Rfl:   •  VITAMIN D, CHOLECALCIFEROL, PO, Take  by mouth Daily., Disp: , Rfl:      Objective     Physical Exam  Vital Signs:   WDWN, Alert, NAD.    HEENT:  PERRL, EOMI.  OP, nares clear, no sinus tenderness  Neck:  Supple, no JVD, no thyromegaly.  Lymph: no axillary, cervical, supraclavicular lymphadenopathy noted bilaterally  Chest:  good aeration, Velcro-like crackles at bases bilaterally, tympanic to percussion bilaterally, no work of breathing noted  CV: RRR, no MGR, pulses 2+, equal.  Abd:  Soft, NT, ND, + BS, no HSM  EXT:  no clubbing, no cyanosis, no edema, no joint tenderness  Neuro:  A&Ox3, CN grossly intact, no focal deficits.  Skin: No rashes or lesions noted.    /77 (BP Location: Left arm, Patient Position: Sitting, Cuff Size: Large Adult)   Pulse 103   Temp 98.2 °F (36.8 °C) (Tympanic)   Resp 20   Ht 180.3 cm (71\")   Wt 127 kg (279 lb 8 oz)   SpO2 96% Comment: room air  BMI 38.98 kg/m²         Result Review :   I have reviewed Dr. Jensen's last office visit note.  Reviewed chest CT report dated from 5/30/2022.  See scanned report.    Procedures:         Assessment and Plan      Assessment:  1.  Combined pulmonary fibrosis with emphysema.  FEV1 of 61% predicted.  Patient on triple inhaler therapy.  Pulmonary fibrosis stable on last chest CT scan.  2.  Chronic dyspnea, at baseline.  3.  Occupational exposures to multiple chemicals inhalants.  4.  Obesity with BMI of 38.9.  5.  Obstructive sleep apnea.  Patient [facility].  6.  Tobacco abuse of " cigarettes in remission.  Not eligible for lung cancer screening as patient reports that he quit smoking 1999.      Plan:  1.  Continue Wixela and Spiriva every day as prescribed and rinse mouth out after each use.  2.  Continue albuterol inhaler as needed.  3.  Continue Singulair.  4.  Will order pulmonary function test and 6-minute walk test to be completed in March 2023.  Order placed today.  5.  Continue CPAP at night and with naps at current settings and clean mask and tubing daily.  [facility].  6.  Will repeat chest CT scan in May 2023.  Order placed today.  7.  Vaccination status: patient reports they are up-to-date with flu, pneumonia, and Covid vaccines.  Patient is advised to continue to follow CDC recommendations such as social distancing wearing a mask and washing hands for at least 20 seconds.  8.  Smoking status: Patient is a former cigarette smoker.  Not eligible for lung cancer screening as patient reports that he quit smoking 1999.  9.  Patient to call the office, 911, or go to the ER with new or worsening symptoms.  10.  Follow-up in 4 to 6 months, sooner if needed.          Follow Up   Return for 4-6 months with Dr. Jensen.  Patient was given instructions and counseling regarding his condition or for health maintenance advice. Please see specific information pulled into the AVS if appropriate.

## 2022-08-29 ENCOUNTER — TRANSCRIBE ORDERS (OUTPATIENT)
Dept: ADMINISTRATIVE | Facility: HOSPITAL | Age: 77
End: 2022-08-29

## 2022-08-29 DIAGNOSIS — N18.31 CHRONIC KIDNEY DISEASE (CKD) STAGE G3A/A1, MODERATELY DECREASED GLOMERULAR FILTRATION RATE (GFR) BETWEEN 45-59 ML/MIN/1.73 SQUARE METER AND ALBUMINURIA CREATININE RATIO LESS THAN 30 MG/G (CMS/H*: Primary | ICD-10-CM

## 2022-08-31 ENCOUNTER — LAB (OUTPATIENT)
Dept: LAB | Facility: HOSPITAL | Age: 77
End: 2022-08-31

## 2022-08-31 DIAGNOSIS — N18.31 CHRONIC KIDNEY DISEASE (CKD) STAGE G3A/A1, MODERATELY DECREASED GLOMERULAR FILTRATION RATE (GFR) BETWEEN 45-59 ML/MIN/1.73 SQUARE METER AND ALBUMINURIA CREATININE RATIO LESS THAN 30 MG/G (CMS/H*: ICD-10-CM

## 2022-08-31 LAB
ALBUMIN SERPL-MCNC: 4.2 G/DL (ref 3.5–5.2)
ALBUMIN/GLOB SERPL: 1.6 G/DL
ALP SERPL-CCNC: 88 U/L (ref 39–117)
ALT SERPL W P-5'-P-CCNC: 12 U/L (ref 1–41)
ANION GAP SERPL CALCULATED.3IONS-SCNC: 10.1 MMOL/L (ref 5–15)
AST SERPL-CCNC: 16 U/L (ref 1–40)
BASOPHILS # BLD AUTO: 0.04 10*3/MM3 (ref 0–0.2)
BASOPHILS NFR BLD AUTO: 0.4 % (ref 0–1.5)
BILIRUB SERPL-MCNC: 0.6 MG/DL (ref 0–1.2)
BILIRUB UR QL STRIP: NEGATIVE
BUN SERPL-MCNC: 11 MG/DL (ref 8–23)
BUN/CREAT SERPL: 8.9 (ref 7–25)
CALCIUM SPEC-SCNC: 9.8 MG/DL (ref 8.6–10.5)
CHLORIDE SERPL-SCNC: 102 MMOL/L (ref 98–107)
CLARITY UR: CLEAR
CO2 SERPL-SCNC: 27.9 MMOL/L (ref 22–29)
COLOR UR: YELLOW
CREAT SERPL-MCNC: 1.24 MG/DL (ref 0.76–1.27)
CREAT UR-MCNC: 25.4 MG/DL
DEPRECATED RDW RBC AUTO: 39.8 FL (ref 37–54)
EGFRCR SERPLBLD CKD-EPI 2021: 60.6 ML/MIN/1.73
EOSINOPHIL # BLD AUTO: 0.57 10*3/MM3 (ref 0–0.4)
EOSINOPHIL NFR BLD AUTO: 5.8 % (ref 0.3–6.2)
ERYTHROCYTE [DISTWIDTH] IN BLOOD BY AUTOMATED COUNT: 12.7 % (ref 12.3–15.4)
GLOBULIN UR ELPH-MCNC: 2.7 GM/DL
GLUCOSE SERPL-MCNC: 110 MG/DL (ref 65–99)
GLUCOSE UR STRIP-MCNC: NEGATIVE MG/DL
HCT VFR BLD AUTO: 43.9 % (ref 37.5–51)
HGB BLD-MCNC: 15 G/DL (ref 13–17.7)
HGB UR QL STRIP.AUTO: NEGATIVE
IMM GRANULOCYTES # BLD AUTO: 0.06 10*3/MM3 (ref 0–0.05)
IMM GRANULOCYTES NFR BLD AUTO: 0.6 % (ref 0–0.5)
KETONES UR QL STRIP: NEGATIVE
LEUKOCYTE ESTERASE UR QL STRIP.AUTO: NEGATIVE
LYMPHOCYTES # BLD AUTO: 2.39 10*3/MM3 (ref 0.7–3.1)
LYMPHOCYTES NFR BLD AUTO: 24.2 % (ref 19.6–45.3)
MCH RBC QN AUTO: 30 PG (ref 26.6–33)
MCHC RBC AUTO-ENTMCNC: 34.2 G/DL (ref 31.5–35.7)
MCV RBC AUTO: 87.8 FL (ref 79–97)
MONOCYTES # BLD AUTO: 0.5 10*3/MM3 (ref 0.1–0.9)
MONOCYTES NFR BLD AUTO: 5.1 % (ref 5–12)
NEUTROPHILS NFR BLD AUTO: 6.3 10*3/MM3 (ref 1.7–7)
NEUTROPHILS NFR BLD AUTO: 63.9 % (ref 42.7–76)
NITRITE UR QL STRIP: NEGATIVE
NRBC BLD AUTO-RTO: 0 /100 WBC (ref 0–0.2)
PH UR STRIP.AUTO: 7 [PH] (ref 5–8)
PHOSPHATE SERPL-MCNC: 2.5 MG/DL (ref 2.5–4.5)
PLATELET # BLD AUTO: 208 10*3/MM3 (ref 140–450)
PMV BLD AUTO: 11 FL (ref 6–12)
POTASSIUM SERPL-SCNC: 4.3 MMOL/L (ref 3.5–5.2)
PROT ?TM UR-MCNC: <4 MG/DL
PROT SERPL-MCNC: 6.9 G/DL (ref 6–8.5)
PROT UR QL STRIP: NEGATIVE
PROT/CREAT UR: NORMAL MG/G{CREAT}
PTH-INTACT SERPL-MCNC: 27.9 PG/ML (ref 15–65)
RBC # BLD AUTO: 5 10*6/MM3 (ref 4.14–5.8)
SODIUM SERPL-SCNC: 140 MMOL/L (ref 136–145)
SP GR UR STRIP: 1.01 (ref 1–1.03)
UROBILINOGEN UR QL STRIP: NORMAL
WBC NRBC COR # BLD: 9.86 10*3/MM3 (ref 3.4–10.8)

## 2022-08-31 PROCEDURE — 81003 URINALYSIS AUTO W/O SCOPE: CPT

## 2022-08-31 PROCEDURE — 84100 ASSAY OF PHOSPHORUS: CPT

## 2022-08-31 PROCEDURE — 82570 ASSAY OF URINE CREATININE: CPT

## 2022-08-31 PROCEDURE — 36415 COLL VENOUS BLD VENIPUNCTURE: CPT

## 2022-08-31 PROCEDURE — 83970 ASSAY OF PARATHORMONE: CPT

## 2022-08-31 PROCEDURE — 80053 COMPREHEN METABOLIC PANEL: CPT

## 2022-08-31 PROCEDURE — 84156 ASSAY OF PROTEIN URINE: CPT

## 2022-08-31 PROCEDURE — 85025 COMPLETE CBC W/AUTO DIFF WBC: CPT

## 2023-01-10 NOTE — PROGRESS NOTES
Primary Care Provider  Jaimie Stallworth APRN     Referring Provider  No ref. provider found     Chief Complaint  Shortness of Breath, Sleep Apnea, and Follow-up (4-6 month follow up. )    Subjective          History of Presenting Illness  Patient is a 77 y.o. male here for follow-up for combined pulmonary fibrosis with emphysema.  Patient states that since last visit he had COVID back in December and was treated with Paxlovid by his primary care provider.  Patient states that his allergy shots were also stopped in November 2022 due to having to get retested and he was restarted on allergy shots 1 week ago.  Patient states that since stopping allergy shots and having COVID his breathing has slightly worsened.  Patient states that he is taking Wixela and Spiriva every day as [facility] and uses albuterol inhaler as needed.  Patient states that he is taking Singulair and Zyrtec for seasonal allergies and he is under the care of an allergist and is now receiving allergy shots again.  Patient states that he is also under the care of Dr. Nguyen, cardiologist for coronary artery disease and is scheduled to follow-up with him in March 2023.  Since last office visit patient had a pulmonary function test and a 6-minute walk test completed on 1/11/2023.  Pulmonary function test report states  low FEV1/FVC with low FEV1 and FVC with low lung volumes and low diffusion capacity.  Suggest moderate to severe mixed obstructive and restrictive airway disease.  Patient 6-minute walk test showed desaturations down to 91% on room air and patient therefore does not qualify for oxygen.  Patient states that he is wearing his CPAP at night which helps him sleep.  Patient's [facility].  Patient denies any morning headaches or excessive daytime sleepiness.  Patient denies fever, chills, night sweats, swollen glands in the head and neck, unintentional weight loss, hemoptysis, purulent sputum production,  dysphagia, chest pain, palpitations, chest tightness, abdominal pain, nausea, vomiting, and diarrhea.  Patient also denies any myalgias, changes in sense of taste and/or smell, sore throat, any other coronavirus or flu-like symptoms.  Patient denies any leg swelling, orthopnea, paroxysmal nocturnal dyspnea.  Patient is able to perform activities of daily living.        Review of Systems   Constitutional: Negative for activity change, appetite change, chills, diaphoresis, fatigue, fever, unexpected weight gain and unexpected weight loss.        Negative for Insomnia   HENT: Negative for congestion (Nasal), mouth sores, nosebleeds, postnasal drip, sore throat, swollen glands and trouble swallowing.         Negative for Thrush  Negative for Hoarseness  Negative for Allergies/Hay Fever  Negative for Recent Head injury  Negative for Ear Fullness  Negative for Nasal or Sinus pain  Negative for Dry lips  Negative for Nasal discharge   Respiratory: Positive for cough (dry) and shortness of breath. Negative for apnea, chest tightness and wheezing.         Negative for Hemoptysis  Negative for Pleuritic pain   Cardiovascular: Negative for chest pain, palpitations and leg swelling.        Negative for Claudication  Negative for Cyanosis  Negative for Dyspnea on exertion   Gastrointestinal: Negative for abdominal pain, diarrhea, nausea, vomiting and GERD.   Musculoskeletal: Negative for joint swelling and myalgias.        Negative for Joint pain  Negative for Joint stiffness   Skin: Negative for color change, dry skin, pallor and rash.   Neurological: Negative for syncope, weakness and headache.   Hematological: Negative for adenopathy. Does not bruise/bleed easily.        Family History   Problem Relation Age of Onset   • Heart failure Mother    • Cancer Father         Passed with lung cancer        Social History     Socioeconomic History   • Marital status:    Tobacco Use   • Smoking status: Former     Packs/day: 1.50      Years: 40.00     Pack years: 60.00     Types: Cigarettes     Start date: 1963     Quit date: 1999     Years since quittin.0   • Smokeless tobacco: Never   Vaping Use   • Vaping Use: Never used   Substance and Sexual Activity   • Alcohol use: Never   • Drug use: Never   • Sexual activity: Not Currently     Partners: Female        Past Medical History:   Diagnosis Date   • Asthma    • COPD (chronic obstructive pulmonary disease) ([facility])    • Coronary artery disease    • Coronary artery disease involving native coronary artery of native heart without angina pectoris 2021     Status post angioplasty and stent placement in  (details not available)   • Diabetes mellitus ([facility])    • Hyperlipidemia    • Hypertension    • Pneumonia    • Primary central sleep apnea    • Sleep apnea    • Sleep apnea, obstructive         Immunization History   Administered Date(s) Administered   • COVID-19 (DOREEN) 2021   • Influenza LAIV (Nasal) 2018, 10/02/2019   • Pneumococcal Conjugate 13-Valent (PCV13) 2022   • Pneumococcal Polysaccharide (PPSV23) 2013       Allergies   Allergen Reactions   • Hydrocodone Irritability   • Keflex [Cephalexin] Rash          Current Outpatient Medications:   •  allopurinol (ZYLOPRIM) 100 MG tablet, Take 100 mg by mouth Daily., Disp: , Rfl:   •  aspirin 81 MG chewable tablet, Chew 81 mg Daily., Disp: , Rfl:   •  cetirizine (zyrTEC) 10 MG tablet, Take 10 mg by mouth Daily., Disp: , Rfl:   •  ezetimibe (ZETIA) 10 MG tablet, Take 10 mg by mouth Daily., Disp: , Rfl:   •  famotidine (PEPCID) 20 MG tablet, Take 20 mg by mouth 2 (Two) Times a Day., Disp: , Rfl:   •  fluticasone (FLONASE) 50 MCG/ACT nasal spray, INSTILL 2 PUFFS INTO EACH NOSTRIL ONCE DAILY, Disp: , Rfl:   •  Fluticasone-Salmeterol (WIXELA INHUB IN), Inhale 2 (Two) Times a Day., Disp: , Rfl:   •  glipizide (GLUCOTROL XL) 2.5 MG 24 hr tablet, Take 2.5 mg by mouth 2 (Two) Times a Day., Disp: , Rfl:   •   "hydroCHLOROthiazide (MICROZIDE) 12.5 MG capsule, Take 12.5 mg by mouth Every Other Day., Disp: , Rfl:   •  losartan (Cozaar) 100 MG tablet, Take 1 tablet by mouth Daily., Disp: 90 tablet, Rfl: 3  •  montelukast (SINGULAIR) 10 MG tablet, Take 10 mg by mouth Every Night., Disp: , Rfl:   •  Omega-3 Fatty Acids (fish oil) 1200 MG capsule capsule, Take 1,200 mg by mouth Daily., Disp: , Rfl:   •  rosuvastatin (CRESTOR) 20 MG tablet, Take 10 mg by mouth 2 (Two) Times a Day., Disp: , Rfl:   •  tiotropium bromide monohydrate (Spiriva Respimat) 2.5 MCG/ACT aerosol solution inhaler, Inhale 2 puffs Daily., Disp: 2 each, Rfl: 0  •  Ventolin  (90 Base) MCG/ACT inhaler, Inhale 2 puffs Every 6 (Six) Hours As Needed., Disp: , Rfl:   •  vitamin B-12 (CYANOCOBALAMIN) 1000 MCG tablet, Take 1,000 mcg by mouth Daily., Disp: , Rfl:   •  VITAMIN D, CHOLECALCIFEROL, PO, Take  by mouth Daily., Disp: , Rfl:   •  ipratropium-albuterol (DUO-NEB) 0.5-2.5 mg/3 ml nebulizer, Take 3 mL by nebulization 4 (Four) Times a Day As Needed for Wheezing for up to 90 days., Disp: 360 mL, Rfl: 3     Objective     Physical Exam  Vital Signs:   Obese, WDWN, Alert, NAD.    HEENT:  PERRL, EOMI.  OP, nares clear, no sinus tenderness  Neck:  Supple, no JVD, no thyromegaly.  Lymph: no axillary, cervical, supraclavicular lymphadenopathy noted bilaterally  Chest: good aeration, Velcro-like crackles at bases bilaterally, tympanic to percussion bilaterally, no work of breathing noted  CV: RRR, no MGR, pulses 2+, equal.  Abd:  Soft, NT, ND, + BS, no HSM  EXT:  no clubbing, no cyanosis, no edema, no joint tenderness  Neuro:  A&Ox3, CN grossly intact, no focal deficits.  Skin: No rashes or lesions noted.      /85 (BP Location: Right arm, Patient Position: Sitting, Cuff Size: Large Adult)   Pulse 107   Temp 98.2 °F (36.8 °C) (Tympanic)   Resp 20   Ht 180.3 cm (70.98\")   Wt 129 kg (284 lb 6.4 oz)   SpO2 96% Comment: room air  BMI 39.68 kg/m²       "   Result Review :   I have reviewed my last office visit note.    Procedures:         Assessment and Plan      Assessment:  1.  Combined pulmonary fibrosis with emphysema.  FEV1 of 61% predicted.  Patient on triple inhaler therapy.  Pulmonary fibrosis stable on last chest CT scan.  2.  Chronic dyspnea.  3.  Occupational exposures to multiple chemicals inhalants.  4.  Obesity with BMI of 39.6.  5.  Obstructive sleep apnea.  Patient [facility].  6.  History of COVID-19 back in December 2022.  Treated with Paxlovid by PCP per patient report.  7.  Tobacco abuse of cigarettes in remission.  Not eligible for lung cancer screening as patient reports that he quit smoking 1999.  8.  Encounter for immunization: Flu vaccine given to the patient in the office today.        Plan:  1.   Patient states that he is having slightly worsening shortness of breath since having COVID back in December 2022 and stopping allergy shots in which she has not restarted 1 week ago.  Will order chest x-ray, CBC, CMP, and proBNP for further evaluation.  Did offer to order an echocardiogram and cardiac work-up, however patient states he is under the care of cardiologist, Dr. Nguyen and would like to wait until he sees him.  2.  Did offer to transition patient over to straight nebulizer treatments, however patient prefers to stay on Wixela and Spiriva that [facility]. Continue Wixela and Spiriva every day as prescribed and rinse mouth out after each use.  3.  Nebulizer machine given to the patient in the office today.  Demonstration/instruction on how to use nebulizer machine performed in the office today.  4.  Will start patient on DuoNeb nebulizer treatments as needed.  5.  Continue albuterol inhaler as needed.  6.  Continue Singulair and Zyrtec and follow-up with allergist and continue allergy shots as scheduled.  7.  Will repeat pulmonary function test and 6-minute walk test in 1 year.  Order placed  today   8.  Patient is scheduled to have a repeat chest CT scan on 5/23/2023.   9.  Continue CPAP at night and with naps at current settings and clean mask and tubing daily.  [facility].  10.  Vaccination status: Flu vaccine given to the patient in the office today.  Patient reports they are up-to-date with pneumonia and Covid vaccines.  Patient is advised to continue to follow CDC recommendations such as social distancing wearing a mask and washing hands for at least 20 seconds.  11.  Smoking status: Patient is a former cigarette smoker.  Not eligible for lung cancer screening as patient reports that he quit smoking 1999.  12.  I counseled the patient on diet and exercise. Patient's BMI and weight were discussed.  The patient was counseled on initiating and intensifying attempts to lose weight.  Patient was counseled about the risks of obesity and advised to decrease caloric intake by 500 calories a day, eat three small meals a day and advised to minimize snacking.  I recommended 30-60 minutes of daily exercise.  13.  Patient to call the office, 911, or go to the ER with new or worsening symptoms.  14.  Follow-up  in 4 months, sooner if needed.  Did offer patient a sooner follow-up, however patient prefers to follow-up after chest CT scan in May 2023.          Follow Up   Return in about 4 months (around 5/25/2023) for with Dr. Jensen.  Patient was given instructions and counseling regarding his condition or for health maintenance advice. Please see specific information pulled into the AVS if appropriate.

## 2023-01-10 NOTE — PATIENT INSTRUCTIONS
Pulmonary Fibrosis  Pulmonary fibrosis is a type of lung disease that causes scarring. Over time, the scar tissue builds up in the air sacs of your lungs (alveoli). This makes it hard for you to breathe because less oxygen gets into your bloodstream.  Scarring from pulmonary fibrosis is permanent and may lead to other serious health problems.  What are the causes?  There are many different causes of pulmonary fibrosis. In some cases, the cause is not known. This is called idiopathic pulmonary fibrosis. Other causes include:  Exposure to chemicals and substances found in agricultural, farm, construction, or factory work. These include mold, asbestos, silica, metal dusts, and toxic fumes.  Sarcoidosis. In this disease, areas of inflammatory cells (granulomas) form and most often affect the lungs.  Autoimmune diseases. These include diseases such as rheumatoid arthritis, systemic sclerosis, or connective tissue disease.  Taking certain medicines. These include drugs used in radiation therapy or used to treat seizures, heart problems, and some infections.  What increases the risk?  You are more likely to develop this condition if:  You have a family history of the disease.  You are an older person. The condition is more common in older adults.  You have a history of smoking.  You have a job that exposes you to certain chemicals.  You have gastroesophageal reflux disease (GERD).  What are the signs or symptoms?  Symptoms of this condition include:  Difficulty breathing that gets worse with activity.  Shortness of breath (dyspnea).  Dry, hacking cough.  Rapid, shallow breathing during exercise or while at rest.  Other symptoms may include:  Loss of appetite or weight loss  Tiredness (fatigue) or weakness.  Bluish skin and lips.  Rounded and enlarged fingertips (clubbing).  How is this diagnosed?  This condition may be diagnosed based on:  Your symptoms and medical history.  A physical exam.  You may also have tests,  including:  A test that involves looking inside your lungs with an instrument (bronchoscopy).  Imaging studies of your lungs and heart.  Tests to measure how well you are breathing (pulmonary function tests).  Blood tests.  Tests to see how well your lungs work while you are walking (pulmonary stress test).  A procedure to remove a lung tissue sample to look at it under a microscope (biopsy).  How is this treated?  There is no cure for pulmonary fibrosis. Treatment focuses on managing symptoms and preventing scarring from getting worse. This may include:  Medicines, such as:  Steroids to prevent permanent lung changes.  Medicines to suppress your body's defense system (immune system).  Medicines to help with lung function by reducing inflammation or scarring.  Ongoing monitoring with X-rays and lab work.  Oxygen therapy.  Pulmonary rehabilitation.  Surgery. In some cases, a lung transplant is possible.  Follow these instructions at home:  Medicines  Take over-the-counter and prescription medicines only as told by your health care provider.  Keep your vaccinations up to date as recommended by your health care provider.  Activity  Get regular exercise, but do not pick activities that are too strenuous for you. Ask your health care provider what activities are safe for you.  If you have physical limitations, you may get exercise by walking, using a stationary bike, or doing chair exercises.  Ask your health care provider about using oxygen while exercising.  Do breathing exercises as told by your health care provider.  Plan rest periods when you get tired.  General instructions  Do not use any products that contain nicotine or tobacco. These products include cigarettes, chewing tobacco, and vaping devices, such as e-cigarettes. If you need help quitting, ask your health care provider.  If you are exposed to chemicals and substances at work, make sure that you wear a mask or respirator at all times.  Learn to manage  stress. If you need help to do this, ask your health care provider.  Join a pulmonary rehabilitation program or a support group for people with pulmonary fibrosis.  Eat small meals often so you do not get too full. Overeating can make breathing trouble worse.  Maintain a healthy weight. Lose weight if you need to.  Keep all follow-up visits. This is important.  Where to find more information  American Lung Association: www.lung.org  National Heart, Lung, and Blood [place]: www.nhlbi.nih.gov  Pulmonary Fibrosis Foundation: pulmonaryfibrosis.org  Contact a health care provider if:  You have symptoms that do not get better with medicines.  You are not able to be as active as usual.  You have trouble taking a deep breath.  You have a fever or chills.  You have blue lips or skin.  You have a lot of headaches.  You cough up mucus that is dark in color.  You have feelings of depression or sadness.  You are unable to sleep because it is hard to breathe.  Get help right away if:  Your symptoms suddenly worsen.  You have chest pain.  You cough up blood.  You get very confused or sleepy.  These symptoms may be an emergency. Get help right away. Call 911.  Do not wait to see if the symptoms will go away.  Do not drive yourself to the hospital.  Summary  Pulmonary fibrosis is a type of lung disease that causes scar tissue to build up in the air sacs of your lungs (alveoli) over time. This makes it hard for you to breathe because less oxygen gets into your bloodstream.  Scarring from pulmonary fibrosis is permanent and may lead to other serious health problems.  You are more likely to develop this condition if you have a family history of the condition or a job that exposes you to certain chemicals.  There is no cure for pulmonary fibrosis. Treatment focuses on managing symptoms and preventing scarring from getting worse.  This information is not intended to replace advice given to you by your health care provider. Make sure  you discuss any questions you have with your health care provider.  Document Revised: 08/09/2022 Document Reviewed: 08/09/2022  Elsevier Patient Education © 2022 [employer]

## 2023-01-11 ENCOUNTER — PROCEDURE VISIT (OUTPATIENT)
Dept: CARDIAC REHAB | Facility: HOSPITAL | Age: 78
End: 2023-01-11
Payer: MEDICARE

## 2023-01-11 ENCOUNTER — HOSPITAL ENCOUNTER (OUTPATIENT)
Dept: RESPIRATORY THERAPY | Facility: HOSPITAL | Age: 78
Discharge: HOME | End: 2023-01-11
Payer: MEDICARE

## 2023-01-11 DIAGNOSIS — J84.10 PULMONARY FIBROSIS: ICD-10-CM

## 2023-01-11 PROCEDURE — 94060 EVALUATION OF WHEEZING: CPT | Performed by: INTERNAL MEDICINE

## 2023-01-11 PROCEDURE — 94729 DIFFUSING CAPACITY: CPT

## 2023-01-11 PROCEDURE — 94060 EVALUATION OF WHEEZING: CPT

## 2023-01-11 PROCEDURE — 94618 PULMONARY STRESS TESTING: CPT

## 2023-01-11 PROCEDURE — 94726 PLETHYSMOGRAPHY LUNG VOLUMES: CPT

## 2023-01-11 PROCEDURE — 94726 PLETHYSMOGRAPHY LUNG VOLUMES: CPT | Performed by: INTERNAL MEDICINE

## 2023-01-11 PROCEDURE — 94729 DIFFUSING CAPACITY: CPT | Performed by: INTERNAL MEDICINE

## 2023-01-11 RX ORDER — ALBUTEROL SULFATE 2.5 MG/3ML
2.5 SOLUTION RESPIRATORY (INHALATION) ONCE
Status: COMPLETED | OUTPATIENT
Start: 2023-01-11 | End: 2023-01-11

## 2023-01-11 RX ADMIN — ALBUTEROL SULFATE 2.5 MG: 2.5 SOLUTION RESPIRATORY (INHALATION) at 15:08

## 2023-01-18 ENCOUNTER — HOSPITAL ENCOUNTER (OUTPATIENT)
Dept: GENERAL RADIOLOGY | Facility: HOSPITAL | Age: 78
Discharge: HOME | End: 2023-01-18
Admitting: NURSE PRACTITIONER
Payer: MEDICARE

## 2023-01-18 ENCOUNTER — OFFICE VISIT (OUTPATIENT)
Dept: PULMONOLOGY | Facility: CLINIC | Age: 78
End: 2023-01-18
Payer: MEDICARE

## 2023-01-18 VITALS
OXYGEN SATURATION: 96 % | DIASTOLIC BLOOD PRESSURE: 85 MMHG | SYSTOLIC BLOOD PRESSURE: 155 MMHG | BODY MASS INDEX: 39.81 KG/M2 | TEMPERATURE: 98.2 F | HEIGHT: 71 IN | RESPIRATION RATE: 20 BRPM | HEART RATE: 107 BPM | WEIGHT: 284.4 LBS

## 2023-01-18 DIAGNOSIS — Z23 FLU VACCINE NEED: ICD-10-CM

## 2023-01-18 DIAGNOSIS — Z86.16 HISTORY OF COVID-19: ICD-10-CM

## 2023-01-18 DIAGNOSIS — Z23 ENCOUNTER FOR IMMUNIZATION: ICD-10-CM

## 2023-01-18 DIAGNOSIS — F17.201 TOBACCO ABUSE, IN REMISSION: Primary | ICD-10-CM

## 2023-01-18 DIAGNOSIS — R91.1 LUNG NODULE: Primary | ICD-10-CM

## 2023-01-18 DIAGNOSIS — J44.9 CHRONIC OBSTRUCTIVE PULMONARY DISEASE, UNSPECIFIED COPD TYPE: ICD-10-CM

## 2023-01-18 DIAGNOSIS — R06.00 DYSPNEA, UNSPECIFIED TYPE: ICD-10-CM

## 2023-01-18 DIAGNOSIS — G47.33 OSA (OBSTRUCTIVE SLEEP APNEA): ICD-10-CM

## 2023-01-18 PROCEDURE — G0008 ADMIN INFLUENZA VIRUS VAC: HCPCS | Performed by: NURSE PRACTITIONER

## 2023-01-18 PROCEDURE — 90662 IIV NO PRSV INCREASED AG IM: CPT | Performed by: NURSE PRACTITIONER

## 2023-01-18 PROCEDURE — 99214 OFFICE O/P EST MOD 30 MIN: CPT | Performed by: NURSE PRACTITIONER

## 2023-01-18 PROCEDURE — 71046 X-RAY EXAM CHEST 2 VIEWS: CPT

## 2023-01-18 RX ORDER — IPRATROPIUM BROMIDE AND ALBUTEROL SULFATE 2.5; .5 MG/3ML; MG/3ML
3 SOLUTION RESPIRATORY (INHALATION) 4 TIMES DAILY PRN
Qty: 360 ML | Refills: 3 | Status: SHIPPED | OUTPATIENT
Start: 2023-01-18 | End: 2023-04-18

## 2023-01-24 ENCOUNTER — HOSPITAL ENCOUNTER (OUTPATIENT)
Dept: CT IMAGING | Facility: HOSPITAL | Age: 78
Discharge: HOME | End: 2023-01-24
Admitting: NURSE PRACTITIONER
Payer: MEDICARE

## 2023-01-24 DIAGNOSIS — R91.1 LUNG NODULE: ICD-10-CM

## 2023-01-24 PROCEDURE — 71250 CT THORAX DX C-: CPT

## 2023-02-06 ENCOUNTER — OFFICE VISIT (OUTPATIENT)
Dept: PULMONOLOGY | Facility: CLINIC | Age: 78
End: 2023-02-06
Payer: MEDICARE

## 2023-02-06 ENCOUNTER — TELEPHONE (OUTPATIENT)
Dept: PULMONOLOGY | Facility: CLINIC | Age: 78
End: 2023-02-06

## 2023-02-06 VITALS
WEIGHT: 288.8 LBS | HEIGHT: 71 IN | TEMPERATURE: 97.8 F | DIASTOLIC BLOOD PRESSURE: 92 MMHG | SYSTOLIC BLOOD PRESSURE: 149 MMHG | BODY MASS INDEX: 40.43 KG/M2 | OXYGEN SATURATION: 98 % | HEART RATE: 94 BPM | RESPIRATION RATE: 20 BRPM

## 2023-02-06 DIAGNOSIS — F17.201 TOBACCO ABUSE, IN REMISSION: ICD-10-CM

## 2023-02-06 DIAGNOSIS — G47.33 OSA (OBSTRUCTIVE SLEEP APNEA): ICD-10-CM

## 2023-02-06 DIAGNOSIS — J84.10 PULMONARY FIBROSIS: ICD-10-CM

## 2023-02-06 DIAGNOSIS — Z86.16 HISTORY OF COVID-19: Primary | ICD-10-CM

## 2023-02-06 DIAGNOSIS — R06.09 CHRONIC DYSPNEA: ICD-10-CM

## 2023-02-06 DIAGNOSIS — J84.10 PULMONARY FIBROSIS: Primary | ICD-10-CM

## 2023-02-06 PROCEDURE — 99214 OFFICE O/P EST MOD 30 MIN: CPT | Performed by: NURSE PRACTITIONER

## 2023-02-06 RX ORDER — NINTEDANIB 150 MG/1
1 CAPSULE ORAL 2 TIMES DAILY
Qty: 60 CAPSULE | Refills: 5 | Status: SHIPPED | OUTPATIENT
Start: 2023-02-06 | End: 2023-02-06

## 2023-02-06 RX ORDER — NINTEDANIB 150 MG/1
150 CAPSULE ORAL 2 TIMES DAILY
Qty: 60 CAPSULE | Refills: 5 | Status: SHIPPED | OUTPATIENT
Start: 2023-02-06 | End: 2023-03-08

## 2023-02-27 ENCOUNTER — TELEPHONE (OUTPATIENT)
Dept: PULMONOLOGY | Facility: CLINIC | Age: 78
End: 2023-02-27
Payer: MEDICARE

## 2023-02-27 NOTE — TELEPHONE ENCOUNTER
Patient's wife Laura called regarding Ofev and it's copay and possible side affects. The copay for the medication is $3000+ and the specialty pharmacy informed her they are not eligible for any copay assistance. She states that they discussed it and have decided to not move forward with this medication. She would like to know if there is any other medication for his diagnosis that is more affordable?

## 2023-03-06 ENCOUNTER — OFFICE VISIT (OUTPATIENT)
Dept: CARDIOLOGY | Facility: CLINIC | Age: 78
End: 2023-03-06
Payer: MEDICARE

## 2023-03-06 VITALS
DIASTOLIC BLOOD PRESSURE: 94 MMHG | HEIGHT: 71 IN | BODY MASS INDEX: 40.04 KG/M2 | WEIGHT: 286 LBS | SYSTOLIC BLOOD PRESSURE: 155 MMHG | HEART RATE: 94 BPM

## 2023-03-06 DIAGNOSIS — I10 ESSENTIAL HYPERTENSION: ICD-10-CM

## 2023-03-06 DIAGNOSIS — I25.10 CORONARY ARTERY DISEASE INVOLVING NATIVE CORONARY ARTERY OF NATIVE HEART WITHOUT ANGINA PECTORIS: Primary | Chronic | ICD-10-CM

## 2023-03-06 DIAGNOSIS — E78.2 MIXED HYPERLIPIDEMIA: ICD-10-CM

## 2023-03-06 PROCEDURE — 99214 OFFICE O/P EST MOD 30 MIN: CPT | Performed by: INTERNAL MEDICINE

## 2023-03-06 RX ORDER — LOSARTAN POTASSIUM 100 MG/1
100 TABLET ORAL DAILY
Qty: 90 TABLET | Refills: 3 | Status: SHIPPED | OUTPATIENT
Start: 2023-03-06

## 2023-03-06 RX ORDER — OLMESARTAN MEDOXOMIL AND HYDROCHLOROTHIAZIDE 40/12.5 40; 12.5 MG/1; MG/1
1 TABLET ORAL DAILY
COMMUNITY
End: 2023-03-06

## 2023-03-06 NOTE — ASSESSMENT & PLAN NOTE
Blood pressure remains elevated.  He is currently taking only 50 mg of losartan.  We will increase the dose back to 100 mg daily and send a new prescription to [facility] per patient's preference.  Continue hydrochlorothiazide at the current dose.  Encouraged to keep home blood pressure log.

## 2023-03-06 NOTE — ASSESSMENT & PLAN NOTE
Currently stable with no angina but LV systolic function is preserved.  Continue aspirin.  He is not on beta-blockers due to significant shortness of breath and lung disease

## 2023-03-06 NOTE — PROGRESS NOTES
CARDIOLOGY FOLLOW-UP PROGRESS NOTE        Chief Complaint  Follow-up, Coronary Artery Disease, and Hypertension    Subjective            Abdi Gomez presents to [facility] CARDIOLOGY  History of Present Illness    Mr Gomez is here for routine 6-month follow-up visit for hypertension and coronary artery disease.During last office visit, the dose of losartan was increased to 100 mg daily due to high blood pressure.  Since then, echocardiogram was performed to evaluate for shortness of breath, which again showed normal LV function.  Today, patient states that he is back to 50 mg of losartan since [facility].  Blood pressure remains elevated at home.  His shortness of breath is getting worse and he had evaluation by pulmonologist recently.  CT scan showed worsening pulmonary fibrosis.  He is going to have definitive treatment but waiting for the insurance approval.  Denies any chest pain.  Denies palpitations.  Not very active at baseline.  He stopped taking statins due to severe pain of the lower extremities.  He is currently on Zetia as [facility].    Past History:    1) Coronary artery disease, status post angioplasty and stent placement in 1999 (details not available); 2) Hypertension; 3) Hyperlipidemia; 4) Diabetes mellitus; 5) Chronic sinus problems. 6) Obstructive sleep apnea    Medical History:  Past Medical History:   Diagnosis Date   • Asthma    • COPD (chronic obstructive pulmonary disease) (HCC)    • Coronary artery disease    • Coronary artery disease involving native coronary artery of native heart without angina pectoris 11/29/2021     Status post angioplasty and stent placement in 1999 (details not available)   • Diabetes mellitus (HCC)    • Hyperlipidemia    • Hypertension    • Pneumonia 2019   • Primary central sleep apnea    • Sleep apnea    • Sleep apnea, obstructive        Surgical History: has a past surgical history  that includes Coronary stent placement and Cardiac catheterization.     Family History: family history includes Cancer in his father; Heart failure in his mother.     Social History: reports that he quit smoking about 24 years ago. His smoking use included cigarettes. He started smoking about 60 years ago. He has a 60.00 pack-year smoking history. He has never used smokeless tobacco. He reports that he does not drink alcohol and does not use drugs.    Allergies: Hydrocodone and Keflex [cephalexin]    Current Outpatient Medications on File Prior to Visit   Medication Sig   • allopurinol (ZYLOPRIM) 100 MG tablet Take 1 tablet by mouth Daily.   • aspirin 81 MG chewable tablet Chew 1 tablet Daily.   • cetirizine (zyrTEC) 10 MG tablet Take 1 tablet by mouth Daily.   • ezetimibe (ZETIA) 10 MG tablet Take 1 tablet by mouth Daily.   • famotidine (PEPCID) 20 MG tablet Take 1 tablet by mouth 2 (Two) Times a Day.   • fluticasone (FLONASE) 50 MCG/ACT nasal spray INSTILL 2 PUFFS INTO EACH NOSTRIL ONCE DAILY   • Fluticasone-Salmeterol (WIXELA INHUB IN) Inhale 250 mcg 2 (Two) Times a Day.   • glipizide (GLUCOTROL XL) 2.5 MG 24 hr tablet Take 1 tablet by mouth 2 (Two) Times a Day.   • hydroCHLOROthiazide (MICROZIDE) 12.5 MG capsule Take 1 capsule by mouth Every Morning.   • ipratropium-albuterol (DUO-NEB) 0.5-2.5 mg/3 ml nebulizer Take 3 mL by nebulization 4 (Four) Times a Day As Needed for Wheezing for up to 90 days.   • montelukast (SINGULAIR) 10 MG tablet Take 1 tablet by mouth Every Night.   • Nintedanib Esylate (Ofev) 150 MG capsule Take 150 mg by mouth 2 (Two) Times a Day for 30 days.   • Omega-3 Fatty Acids (fish oil) 1200 MG capsule capsule Take 1 capsule by mouth Daily.   • tiotropium bromide monohydrate (Spiriva Respimat) 2.5 MCG/ACT aerosol solution inhaler Inhale 2 puffs Daily.   • Ventolin  (90 Base) MCG/ACT inhaler Inhale 2 puffs Every 6 (Six) Hours As Needed.   • vitamin B-12 (CYANOCOBALAMIN) 1000 MCG tablet  "Take 1 tablet by mouth Daily.   • VITAMIN D, CHOLECALCIFEROL, PO Take  by mouth Daily.   •  losartan (Cozaar) 100 MG tablet Take 1 tablet by mouth Daily. (Patient taking differently: Take 50 mg by mouth Daily.)       Review of Systems   Respiratory: Positive for shortness of breath. Negative for cough and wheezing.    Cardiovascular: Negative for chest pain, palpitations and leg swelling.   Gastrointestinal: Negative for nausea and vomiting.   Musculoskeletal: Positive for back pain.   Neurological: Negative for dizziness and syncope.        Objective     /94   Pulse 94   Ht 180.3 cm (70.98\")   Wt 130 kg (286 lb)   BMI 39.91 kg/m²       Physical Exam    General : Alert, awake, no acute distress  Neck : Supple, no carotid bruit, no jugular venous distention  CVS : Regular rate and rhythm, no murmur, rubs or gallops  Lungs: Clear to auscultation bilaterally, no crackles or rhonchi  Abdomen: Soft, nontender, bowel sounds heard in all 4 quadrants  Extremities: Warm, well-perfused, no pedal edema    Result Review :     The following data was reviewed by: Sanford Nguyen MD on 03/06/2023:    CMP    CMP 8/31/22   Glucose 110 (A)   BUN 11   Creatinine 1.24   eGFR 60.6   Sodium 140   Potassium 4.3   Chloride 102   Calcium 9.8   Total Protein 6.9   Albumin 4.20   Globulin 2.7   Total Bilirubin 0.6   Alkaline Phosphatase 88   AST (SGOT) 16   ALT (SGPT) 12   Albumin/Globulin Ratio 1.6   BUN/Creatinine Ratio 8.9   Anion Gap 10.1   (A) Abnormal value       Comments are available for some flowsheets but are not being displayed.           CBC    CBC 8/31/22   WBC 9.86   RBC 5.00   Hemoglobin 15.0   Hematocrit 43.9   MCV 87.8   MCH 30.0   MCHC 34.2   RDW 12.7   Platelets 208           Labs done at [facility] on 2/28/2023 showed    Hemoglobin 14.9 hematocrit 43.6 platelet count 199 WBC 11.0  BUN 11 creatinine 1.39 sodium 139 potassium 4.1  AST 15 ALT 17 bilirubin 1.04 calcium 10.0  HDL 42   total cholesterol " 222         Data reviewed: Cardiology studies        Results for orders placed in visit on 06/15/22    Adult Transthoracic Echo Complete W/ Cont if Necessary Per Protocol    Interpretation Summary  · Left ventricular ejection fraction appears to be 56 - 60%. Left ventricular systolic function is normal.  · Left ventricular diastolic function is consistent with (grade I) impaired relaxation.  · There are no significant valvular abnormalities.  · There is mild dilation of aortic root and ascending aorta.  · Estimated right ventricular systolic pressure from tricuspid regurgitation is normal (<35 mmHg).                   Assessment and Plan        Diagnoses and all orders for this visit:    1. Coronary artery disease involving native coronary artery of native heart without angina pectoris (Primary)  Assessment & Plan:  Currently stable with no angina but LV systolic function is preserved.  Continue aspirin.  He is not on beta-blockers due to significant shortness of breath and lung disease      2. Essential hypertension  Assessment & Plan:  Blood pressure remains elevated.  He is currently taking only 50 mg of losartan.  We will increase the dose back to 100 mg daily and send a new prescription to [facility] per patient's preference.  Continue hydrochlorothiazide at the current dose.  Encouraged to keep home blood pressure log.    Orders:  -     losartan (Cozaar) 100 MG tablet; Take 1 tablet by mouth Daily.  Dispense: 90 tablet; Refill: 3    3. Mixed hyperlipidemia  Assessment & Plan:  Most recent LDL is 155, which is significantly above goal.  He stopped taking statin several months back due to leg pain and does not want to go back on it.  Recently started on Zetia by the primary care provider, which will be continued.  Repeat lipid panel before next visit.              Follow Up     Return in about 4 months (around 7/6/2023) for Next scheduled follow up, with Shima BRAUN.    Patient was given instructions and  counseling regarding his condition or for health maintenance advice. Please see specific information pulled into the AVS if appropriate.

## 2023-03-06 NOTE — ASSESSMENT & PLAN NOTE
Most recent LDL is 155, which is significantly above goal.  He stopped taking statin several months back due to leg pain and does not want to go back on it.  Recently started on Zetia by the primary care provider, which will be continued.  Repeat lipid panel before next visit.

## 2023-03-07 ENCOUNTER — APPOINTMENT (OUTPATIENT)
Dept: RESPIRATORY THERAPY | Facility: HOSPITAL | Age: 78
End: 2023-03-07
Payer: MEDICARE

## 2023-03-07 ENCOUNTER — APPOINTMENT (OUTPATIENT)
Dept: CARDIAC REHAB | Facility: HOSPITAL | Age: 78
End: 2023-03-07
Payer: MEDICARE

## 2023-03-12 NOTE — PROGRESS NOTES
Primary Care Provider  Jaimie Stallworth APRN     Referring Provider  No ref. provider found     Chief Complaint  COPD, Pulmonary Fibrosis, Shortness of Breath, Wheezing, and Cough (6 week follow up)    Subjective          History of Presenting Illness  Patient is a 77 y.o. male, patient of Dr. Saenz who presents for management of combined pulmonary fibrosis with emphysema who presents for a follow up visit today.    Patient states that he does get short of breath that is worse with exertion, moderate in severity, and improved with rest.  Patient states that he is taking Spiriva and Wixela every day [facility] and uses albuterol inhaler and DuoNeb nebulizer treatments as needed.  Patient is also on Zyrtec and Singulair for seasonal allergies and is under the care of an allergist and is receiving allergy shots.  Patient had a repeat chest CT scan completed on 1/25/2023 and report head showed no CT abnormality to correspond to proceed nodular density in the right midlung on prior radiograph.  There was a stable 6 mm nodular density in the right upper lobe and a few calcified granulomas.  Report also stated emphysema and subpleural reticulations in the lungs which is slowly progressed over the course of 7 years.  Patient was agreeable to start Ofev last office visit, however patient reports that he did not start medication as his copayment is $3000 and he is not eligible for assistance.  Patient is needing to see if there is a different medication he can try that is cheaper.  Patient states that he is using his CPAP machine at night which helps him sleep.  Patient states that he is under the care of sleep specialist, Dr. Badillo who is managing.  Patient denies any morning headaches or excessive daytime sleepiness. Patient denies fever, chills, night sweats, swollen glands in the head and neck, unintentional weight loss, hemoptysis, purulent sputum production, dysphagia, chest pain, palpitations,  chest tightness, abdominal pain, nausea, vomiting, and diarrhea.  Patient also denies any myalgias, changes in sense of taste and/or smell, sore throat, any other coronavirus or flu-like symptoms.  Patient denies any leg swelling, orthopnea, paroxysmal nocturnal dyspnea.  Patient is able to perform activities of daily living.        Review of Systems   Constitutional: Negative for activity change, appetite change, chills, diaphoresis, fatigue, fever, unexpected weight gain and unexpected weight loss.        Negative for Insomnia   HENT: Negative for congestion (Nasal), mouth sores, nosebleeds, postnasal drip, sore throat, swollen glands and trouble swallowing.         Negative for Thrush  Negative for Hoarseness  Negative for Allergies/Hay Fever  Negative for Recent Head injury  Negative for Ear Fullness  Negative for Nasal or Sinus pain  Negative for Dry lips  Negative for Nasal discharge   Respiratory: Positive for cough (at times in the morning) and shortness of breath. Negative for apnea, chest tightness and wheezing.         Negative for Hemoptysis  Negative for Pleuritic pain   Cardiovascular: Negative for chest pain, palpitations and leg swelling.        Negative for Claudication  Negative for Cyanosis  Negative for Dyspnea on exertion   Gastrointestinal: Negative for abdominal pain, diarrhea, nausea, vomiting and GERD.   Musculoskeletal: Negative for joint swelling and myalgias.        Negative for Joint pain  Negative for Joint stiffness   Skin: Negative for color change, dry skin, pallor and rash.   Neurological: Negative for syncope, weakness and headache.   Hematological: Negative for adenopathy. Does not bruise/bleed easily.        Family History   Problem Relation Age of Onset   • Heart failure Mother    • Cancer Father         Passed with lung cancer        Social History     Socioeconomic History   • Marital status:    Tobacco Use   • Smoking status: Former     Packs/day: 1.50     Years: 40.00      Pack years: 60.00     Types: Cigarettes     Start date: 1963     Quit date: 1999     Years since quittin.2   • Smokeless tobacco: Never   Vaping Use   • Vaping Use: Never used   Substance and Sexual Activity   • Alcohol use: Never   • Drug use: Never   • Sexual activity: Not Currently     Partners: Female        Past Medical History:   Diagnosis Date   • Asthma    • COPD (chronic obstructive pulmonary disease) ([facility])    • Coronary artery disease    • Coronary artery disease involving native coronary artery of native heart without angina pectoris 2021     Status post angioplasty and stent placement in  (details not available)   • Diabetes mellitus ([facility])    • Hyperlipidemia    • Hypertension    • Pneumonia    • Primary central sleep apnea    • Sleep apnea    • Sleep apnea, obstructive         Immunization History   Administered Date(s) Administered   • COVID-19 (DOREEN) 2021   • Fluzone High-Dose 65+yrs 2023   • Influenza LAIV (Nasal) 2018, 10/02/2019   • Pneumococcal Conjugate 13-Valent (PCV13) 2022   • Pneumococcal Conjugate 20-Valent (PCV20) 2023   • Pneumococcal Polysaccharide (PPSV23) 2013       Allergies   Allergen Reactions   • Hydrocodone Irritability   • Keflex [Cephalexin] Rash          Current Outpatient Medications:   •  allopurinol (ZYLOPRIM) 100 MG tablet, Take 1 tablet by mouth Daily., Disp: , Rfl:   •  aspirin 81 MG chewable tablet, Chew 1 tablet Daily., Disp: , Rfl:   •  cetirizine (zyrTEC) 10 MG tablet, Take 1 tablet by mouth Daily., Disp: , Rfl:   •  ezetimibe (ZETIA) 10 MG tablet, Take 1 tablet by mouth Daily., Disp: , Rfl:   •  famotidine (PEPCID) 20 MG tablet, Take 1 tablet by mouth 2 (Two) Times a Day., Disp: , Rfl:   •  fluticasone (FLONASE) 50 MCG/ACT nasal spray, INSTILL 2 PUFFS INTO EACH NOSTRIL ONCE DAILY, Disp: , Rfl:   •  Fluticasone-Salmeterol (WIXELA INHUB IN), Inhale 250 mcg 2 (Two) Times a Day., Disp: , Rfl:   •   "glipizide (GLUCOTROL XL) 2.5 MG 24 hr tablet, Take 1 tablet by mouth 2 (Two) Times a Day., Disp: , Rfl:   •  hydroCHLOROthiazide (MICROZIDE) 12.5 MG capsule, Take 1 capsule by mouth Every Morning., Disp: , Rfl:   •  ipratropium-albuterol (DUO-NEB) 0.5-2.5 mg/3 ml nebulizer, Take 3 mL by nebulization 4 (Four) Times a Day As Needed for Wheezing for up to 90 days., Disp: 360 mL, Rfl: 3  •  losartan (Cozaar) 100 MG tablet, Take 1 tablet by mouth Daily., Disp: 90 tablet, Rfl: 3  •  montelukast (SINGULAIR) 10 MG tablet, Take 1 tablet by mouth Every Night., Disp: , Rfl:   •  Omega-3 Fatty Acids (fish oil) 1200 MG capsule capsule, Take 1 capsule by mouth Daily., Disp: , Rfl:   •  tiotropium bromide monohydrate (Spiriva Respimat) 2.5 MCG/ACT aerosol solution inhaler, Inhale 2 puffs Daily., Disp: 2 each, Rfl: 0  •  Ventolin  (90 Base) MCG/ACT inhaler, Inhale 2 puffs Every 6 (Six) Hours As Needed., Disp: , Rfl:   •  vitamin B-12 (CYANOCOBALAMIN) 1000 MCG tablet, Take 1 tablet by mouth Daily., Disp: , Rfl:   •  VITAMIN D, CHOLECALCIFEROL, PO, Take  by mouth Daily., Disp: , Rfl:      Objective     Physical Exam  Vital Signs:   Obese, WDWN, Alert, NAD.    HEENT:  PERRL, EOMI.  OP, nares clear, no sinus tenderness  Neck:  Supple, no JVD, no thyromegaly.  Lymph: no axillary, cervical, supraclavicular lymphadenopathy noted bilaterally  Chest:   Mildly decreased breath sounds throughout. No wheezes, rales, or rhonchi appreciated.  Normal work of breathing noted.  Patient is able speak full sentences without difficulty.  CV: RRR, no MGR, pulses 2+, equal.  Abd:  Soft, NT, ND, + BS, no HSM  EXT:  no clubbing, no cyanosis, no edema, no joint tenderness  Neuro:  A&Ox3, CN grossly intact, no focal deficits.  Skin: No rashes or lesions noted.    /93 (BP Location: Right arm, Patient Position: Sitting, Cuff Size: Large Adult)   Pulse 96   Temp 98.4 °F (36.9 °C) (Tympanic)   Resp 18   Ht 180.3 cm (71\")   Wt 134 kg (295 lb " 6.4 oz)   SpO2 98% Comment: room air  BMI 41.20 kg/m²         Result Review :   I have reviewed my last office visit note.    Procedures:         Assessment and Plan      Assessment:  1. Combined pulmonary fibrosis with emphysema.  FEV1 of 71% predicted.  Patient on triple inhaler therapy. Recent chest CT scan showing progression of pulmonary fibrosis.   2.  Chronic dyspnea.  3.  Occupational exposures to multiple chemicals inhalants.  4.  Obesity with BMI of 41.2.  5.  Obstructive sleep apnea.  Patient nightly CPAP that is managed by Dr. Badillo, sleep specialist.  6.  History of COVID-19 back in December 2022.  Treated with Paxlovid by PCP per patient report.  7.  Seasonal allergies.  Patient is under the care of an allergist and is receiving allergy shots.  8.  Tobacco abuse of cigarettes in remission.  Not eligible for lung cancer screening as patient reports that he quit smoking 1999.      Plan:  1.  Chest CT scan showing progression of pulmonary fibrosis.  Patient was agreeable to start Ofev last office visit, however patient reports that he did not start medication as his copayment is $3000 and he is not eligible for assistance.  Patient is needing to see if there is a different medication he can try that is cheaper.  Will have our nurse navigator submit Esbriet to see if this medication is cheaper.  Patient states that he would like [facility] to [facility]. Will have our nurse navigator start the process of getting medication approved.  Message has been sent to our nurse navigator.  How to take medications and possible side effects of medications discussed with the patient.  Patient verbalized understanding and compliance.   2.  Will order CBC and CMP for medication monitoring. Orders placed today.  3.  Continue Wixela and Spiriva that is [facility] by the [facility] every day as prescribed and rinse mouth out after each use.  4. Continue albuterol inhaler and Duoneb nebulizer  treatments as needed.  5.  Continue Singulair and Zyrtec as prescribed and follow-up with allergist and continue allergy shots scheduled.  6. Continue CPAP at night and with naps at current settings and clean mask and tubing daily.  Follow-up with sleep specialist as scheduled.  7.  I counseled the patient on diet and exercise. Patient's BMI and weight were discussed.  The patient was counseled on initiating and intensifying attempts to lose weight.  Patient was counseled about the risks of obesity and advised to decrease caloric intake by 500 calories a day, eat three small meals a day and advised to minimize snacking.  I recommended 30-60 minutes of daily exercise.  8.  Vaccination status: patient reports they are up-to-date with flu, pneumonia, and Covid vaccines.  Patient is advised to continue to follow CDC recommendations such as social distancing wearing a mask and washing hands for at least 20 seconds.  9.  Smoking status: Patient is a former cigarette smoker.  Not eligible for lung cancer screening as patient reports that he quit smoking 1999.  10.  Patient to call the office, 911, or go to the ER with new or worsening symptoms.  11.  Follow-up in May 2023 as scheduled, sooner if needed.            Follow Up   Return for keep follow up appt with Dr. Jensen as scheduled.  Patient was given instructions and counseling regarding his condition or for health maintenance advice. Please see specific information pulled into the AVS if appropriate.

## 2023-03-12 NOTE — PATIENT INSTRUCTIONS
Pulmonary Fibrosis  Pulmonary fibrosis is a type of lung disease that causes scarring. Over time, the scar tissue builds up in the air sacs of your lungs (alveoli). This makes it hard for you to breathe because less oxygen gets into your bloodstream.  Scarring from pulmonary fibrosis is permanent and may lead to other serious health problems.  What are the causes?  There are many different causes of pulmonary fibrosis. In some cases, the cause is not known. This is called idiopathic pulmonary fibrosis. Other causes include:  Exposure to chemicals and substances found in agricultural, farm, construction, or factory work. These include mold, asbestos, silica, metal dusts, and toxic fumes.  Sarcoidosis. In this disease, areas of inflammatory cells (granulomas) form and most often affect the lungs.  Autoimmune diseases. These include diseases such as rheumatoid arthritis, systemic sclerosis, or connective tissue disease.  Taking certain medicines. These include drugs used in radiation therapy or used to treat seizures, heart problems, and some infections.  What increases the risk?  You are more likely to develop this condition if:  You have a family history of the disease.  You are an older person. The condition is more common in older adults.  You have a history of smoking.  You have a job that exposes you to certain chemicals.  You have gastroesophageal reflux disease (GERD).  What are the signs or symptoms?  Symptoms of this condition include:  Difficulty breathing that gets worse with activity.  Shortness of breath (dyspnea).  Dry, hacking cough.  Rapid, shallow breathing during exercise or while at rest.  Other symptoms may include:  Loss of appetite or weight loss  Tiredness (fatigue) or weakness.  Bluish skin and lips.  Rounded and enlarged fingertips (clubbing).  How is this diagnosed?  This condition may be diagnosed based on:  Your symptoms and medical history.  A physical exam.  You may also have tests,  including:  A test that involves looking inside your lungs with an instrument (bronchoscopy).  Imaging studies of your lungs and heart.  Tests to measure how well you are breathing (pulmonary function tests).  Blood tests.  Tests to see how well your lungs work while you are walking (pulmonary stress test).  A procedure to remove a lung tissue sample to look at it under a microscope (biopsy).  How is this treated?  There is no cure for pulmonary fibrosis. Treatment focuses on managing symptoms and preventing scarring from getting worse. This may include:  Medicines, such as:  Steroids to prevent permanent lung changes.  Medicines to suppress your body's defense system (immune system).  Medicines to help with lung function by reducing inflammation or scarring.  Ongoing monitoring with X-rays and lab work.  Oxygen therapy.  Pulmonary rehabilitation.  Surgery. In some cases, a lung transplant is possible.  Follow these instructions at home:  Medicines  Take over-the-counter and prescription medicines only as told by your health care provider.  Keep your vaccinations up to date as recommended by your health care provider.  Activity  Get regular exercise, but do not pick activities that are too strenuous for you. Ask your health care provider what activities are safe for you.  If you have physical limitations, you may get exercise by walking, using a stationary bike, or doing chair exercises.  Ask your health care provider about using oxygen while exercising.  Do breathing exercises as told by your health care provider.  Plan rest periods when you get tired.  General instructions  Do not use any products that contain nicotine or tobacco. These products include cigarettes, chewing tobacco, and vaping devices, such as e-cigarettes. If you need help quitting, ask your health care provider.  If you are exposed to chemicals and substances at work, make sure that you wear a mask or respirator at all times.  Learn to manage  stress. If you need help to do this, ask your health care provider.  Join a pulmonary rehabilitation program or a support group for people with pulmonary fibrosis.  Eat small meals often so you do not get too full. Overeating can make breathing trouble worse.  Maintain a healthy weight. Lose weight if you need to.  Keep all follow-up visits. This is important.  Where to find more information  American Lung Association: www.lung.org  National Heart, Lung, and Blood [place]: www.nhlbi.nih.gov  Pulmonary Fibrosis Foundation: pulmonaryfibrosis.org  Contact a health care provider if:  You have symptoms that do not get better with medicines.  You are not able to be as active as usual.  You have trouble taking a deep breath.  You have a fever or chills.  You have blue lips or skin.  You have a lot of headaches.  You cough up mucus that is dark in color.  You have feelings of depression or sadness.  You are unable to sleep because it is hard to breathe.  Get help right away if:  Your symptoms suddenly worsen.  You have chest pain.  You cough up blood.  You get very confused or sleepy.  These symptoms may be an emergency. Get help right away. Call 911.  Do not wait to see if the symptoms will go away.  Do not drive yourself to the hospital.  Summary  Pulmonary fibrosis is a type of lung disease that causes scar tissue to build up in the air sacs of your lungs (alveoli) over time. This makes it hard for you to breathe because less oxygen gets into your bloodstream.  Scarring from pulmonary fibrosis is permanent and may lead to other serious health problems.  You are more likely to develop this condition if you have a family history of the condition or a job that exposes you to certain chemicals.  There is no cure for pulmonary fibrosis. Treatment focuses on managing symptoms and preventing scarring from getting worse.  This information is not intended to replace advice given to you by your health care provider. Make sure  you discuss any questions you have with your health care provider.  Document Revised: 08/09/2022 Document Reviewed: 08/09/2022  Elsevier Patient Education © 2022 [employer]

## 2023-03-20 ENCOUNTER — TELEPHONE (OUTPATIENT)
Dept: PULMONOLOGY | Facility: CLINIC | Age: 78
End: 2023-03-20

## 2023-03-20 ENCOUNTER — OFFICE VISIT (OUTPATIENT)
Dept: PULMONOLOGY | Facility: CLINIC | Age: 78
End: 2023-03-20
Payer: MEDICARE

## 2023-03-20 VITALS
BODY MASS INDEX: 41.35 KG/M2 | DIASTOLIC BLOOD PRESSURE: 93 MMHG | SYSTOLIC BLOOD PRESSURE: 172 MMHG | RESPIRATION RATE: 18 BRPM | HEIGHT: 71 IN | WEIGHT: 295.4 LBS | TEMPERATURE: 98.4 F | OXYGEN SATURATION: 98 % | HEART RATE: 96 BPM

## 2023-03-20 DIAGNOSIS — G47.33 OSA (OBSTRUCTIVE SLEEP APNEA): Primary | ICD-10-CM

## 2023-03-20 DIAGNOSIS — Z51.81 MEDICATION MONITORING ENCOUNTER: ICD-10-CM

## 2023-03-20 DIAGNOSIS — J84.10 PULMONARY FIBROSIS: ICD-10-CM

## 2023-03-20 DIAGNOSIS — J30.2 SEASONAL ALLERGIES: ICD-10-CM

## 2023-03-20 DIAGNOSIS — R06.09 CHRONIC DYSPNEA: ICD-10-CM

## 2023-03-20 DIAGNOSIS — F17.201 TOBACCO ABUSE, IN REMISSION: ICD-10-CM

## 2023-03-20 PROCEDURE — 99214 OFFICE O/P EST MOD 30 MIN: CPT | Performed by: NURSE PRACTITIONER

## 2023-03-20 PROCEDURE — 3080F DIAST BP >= 90 MM HG: CPT | Performed by: NURSE PRACTITIONER

## 2023-03-20 PROCEDURE — 1160F RVW MEDS BY RX/DR IN RCRD: CPT | Performed by: NURSE PRACTITIONER

## 2023-03-20 PROCEDURE — 1159F MED LIST DOCD IN RCRD: CPT | Performed by: NURSE PRACTITIONER

## 2023-03-20 PROCEDURE — 3077F SYST BP >= 140 MM HG: CPT | Performed by: NURSE PRACTITIONER

## 2023-03-20 NOTE — TELEPHONE ENCOUNTER
Ofev is too expensive per pt report. Pt states that he did not qualify for assistance with medication. Pt would like to see if Esbriet would be cheaper and he would like [facility]. Thank you.

## 2023-03-20 NOTE — TELEPHONE ENCOUNTER
Attempting to reach [facility] at [place] to check availability of OFEV or Esbriet.

## 2023-03-21 ENCOUNTER — TELEPHONE (OUTPATIENT)
Dept: PULMONOLOGY | Facility: CLINIC | Age: 78
End: 2023-03-21
Payer: MEDICARE

## 2023-03-21 DIAGNOSIS — J84.10 PULMONARY FIBROSIS: Primary | ICD-10-CM

## 2023-03-21 RX ORDER — NINTEDANIB 150 MG/1
150 CAPSULE ORAL 2 TIMES DAILY
Qty: 60 CAPSULE | Refills: 5 | Status: SHIPPED | OUTPATIENT
Start: 2023-03-21

## 2023-03-21 NOTE — TELEPHONE ENCOUNTER
Spoke with Zeke at [facility].  Zeke faxed the criteria for use for OFEV.  Form completed and records to include prescription, PFT, and office notes dated 02/06/2023 and 03/20/2023 included for review and consideration.    Mrs. Gomez requested to know the copayment before the medication is shipped to their home.

## 2023-03-21 NOTE — TELEPHONE ENCOUNTER
OFEV or Esbriet are not available at [facility].  Reaching out to Dr. Servando Patel's office ([facility] provider) to see if it is available through the [facility].

## 2023-03-24 ENCOUNTER — TELEPHONE (OUTPATIENT)
Dept: PULMONOLOGY | Facility: CLINIC | Age: 78
End: 2023-03-24
Payer: MEDICARE

## 2023-03-24 NOTE — TELEPHONE ENCOUNTER
Spoke with [facility] pharmacy.  Request has been received and is under review.  Invited to call back Monday or Tuesday for status.

## 2023-03-24 NOTE — TELEPHONE ENCOUNTER
Spoke with pharmacy at [facility] ([phone]).  Advised document and exception request were received and are currently in review for consideration.

## 2023-03-28 ENCOUNTER — TELEPHONE (OUTPATIENT)
Dept: PULMONOLOGY | Facility: CLINIC | Age: 78
End: 2023-03-28
Payer: MEDICARE

## 2023-03-28 NOTE — TELEPHONE ENCOUNTER
Spoke with [facility] - OFEV has been approved and is being shipped to the patient.  Called and spoke to Mrs. Gomez.  She is aware that the medication was approved and indicated the copay is only $11.00.  Invited Mrs Gomez to call the office with questions or concerns.  She is agreeable.

## 2023-04-04 ENCOUNTER — TELEPHONE (OUTPATIENT)
Dept: PULMONOLOGY | Facility: CLINIC | Age: 78
End: 2023-04-04
Payer: MEDICARE

## 2023-04-04 NOTE — TELEPHONE ENCOUNTER
"Called and spoke with Mrs. Gomez.  She confirmed OFEV was received and patient started it \"a few days ago\".  She reports patient is doing well on the medication thus far.  Mrs. Gomez states primary care provider recommended a percussion vest to assist with excretion expectoration.  Recommended to discuss with Dr. Jensen at next office visit, as detailed documentation must be in place to qualify for equipment.  Mrs. Gomez is agreeable.  Also discussed the possibility of pulmonary rehab to help patient regain strength and stamina with activities.  Mrs. Gomez is going to discuss it with patient for next office visit.    "

## 2023-04-07 ENCOUNTER — OFFICE VISIT (OUTPATIENT)
Dept: PODIATRY | Facility: CLINIC | Age: 78
End: 2023-04-07
Payer: MEDICARE

## 2023-04-07 VITALS
HEART RATE: 84 BPM | SYSTOLIC BLOOD PRESSURE: 162 MMHG | BODY MASS INDEX: 40.6 KG/M2 | DIASTOLIC BLOOD PRESSURE: 112 MMHG | WEIGHT: 290 LBS | OXYGEN SATURATION: 95 % | TEMPERATURE: 98.7 F | HEIGHT: 71 IN

## 2023-04-07 DIAGNOSIS — E11.8 DIABETIC FOOT: ICD-10-CM

## 2023-04-07 DIAGNOSIS — B35.1 ONYCHOMYCOSIS: ICD-10-CM

## 2023-04-07 DIAGNOSIS — M79.671 FOOT PAIN, BILATERAL: ICD-10-CM

## 2023-04-07 DIAGNOSIS — M79.672 FOOT PAIN, BILATERAL: ICD-10-CM

## 2023-04-07 DIAGNOSIS — L60.0 ONYCHOCRYPTOSIS: ICD-10-CM

## 2023-04-07 DIAGNOSIS — G62.9 NEUROPATHY: Primary | ICD-10-CM

## 2023-04-07 DIAGNOSIS — E11.9 NON-INSULIN DEPENDENT TYPE 2 DIABETES MELLITUS: ICD-10-CM

## 2023-04-07 PROCEDURE — G8404 LOW EXTEMITY NEUR EXAM DOCUM: HCPCS | Performed by: PODIATRIST

## 2023-04-07 PROCEDURE — 11721 DEBRIDE NAIL 6 OR MORE: CPT | Performed by: PODIATRIST

## 2023-04-07 PROCEDURE — 1160F RVW MEDS BY RX/DR IN RCRD: CPT | Performed by: PODIATRIST

## 2023-04-07 PROCEDURE — 99203 OFFICE O/P NEW LOW 30 MIN: CPT | Performed by: PODIATRIST

## 2023-04-07 PROCEDURE — 3080F DIAST BP >= 90 MM HG: CPT | Performed by: PODIATRIST

## 2023-04-07 PROCEDURE — 3077F SYST BP >= 140 MM HG: CPT | Performed by: PODIATRIST

## 2023-04-07 PROCEDURE — 1159F MED LIST DOCD IN RCRD: CPT | Performed by: PODIATRIST

## 2023-04-07 RX ORDER — CRANBERRY FRUIT EXTRACT 200 MG
CAPSULE ORAL DAILY
COMMUNITY

## 2023-04-07 RX ORDER — HYDROXYCHLOROQUINE SULFATE 200 MG/1
200 TABLET, FILM COATED ORAL 2 TIMES DAILY
COMMUNITY

## 2023-04-07 RX ORDER — CLINDAMYCIN HYDROCHLORIDE 150 MG/1
CAPSULE ORAL
COMMUNITY
Start: 2023-04-03

## 2023-04-07 NOTE — PROGRESS NOTES
[facility] - PODIATRY    Today's Date: 04/07/23    Patient Name: Abdi Gomez  MRN: [id number]  CSN: [id number]  PCP: Jaimie Stallworth APRN, Last PCP Visit:  4/3/2023  Referring Provider: Referring, Self    SUBJECTIVE     Chief Complaint   Patient presents with   • Left Foot - Establish Care, Annual Exam, Diabetes, Nail Problem, Numbness     BURNING    • Right Foot - Establish Care, Annual Exam, Diabetes, Nail Problem, Numbness     BURNING      HPI: Abdi Gomez, a 77 y.o.male, presents to clinic for painful toenail and a diabetic foot evaluation.    New, Established, New Problem:  New  Location:  Toenails  Duration:   Greater than five years  Onset:  Gradual  Nature:  sore with palpation.  Stable, worsening, improving:   worsening  Aggravating factors:  Pain with shoe gear and ambulation.  Previous Treatment: Unable to trim their own toenails.    Patient controlling diabetes via: Oral medication    Patient states their most recent HgB A1C was 6.4.    Patient denies any fevers, chills, nausea, vomiting, shortness of breath, nor any other constitutional signs nor symptoms.    Relates numbness in his first toes bilaterally.    Past Medical History:   Diagnosis Date   • Asthma    • COPD (chronic obstructive pulmonary disease)    • Coronary artery disease    • Coronary artery disease involving native coronary artery of native heart without angina pectoris 11/29/2021     Status post angioplasty and stent placement in 1999 (details not available)   • Diabetes mellitus    • Difficulty walking 2022   • Gout 2022   • Hyperlipidemia    • Hypertension    • Pneumonia 2019   • Primary central sleep apnea    • Sleep apnea    • Sleep apnea, obstructive      Past Surgical History:   Procedure Laterality Date   • CARDIAC CATHETERIZATION     • CORONARY STENT PLACEMENT       Family History   Problem Relation Age of Onset   • Heart failure Mother    • Cancer Father         Passed with lung cancer      Social History     Socioeconomic History   • Marital status:    Tobacco Use   • Smoking status: Former     Packs/day: 1.50     Years: 40.00     Pack years: 60.00     Types: Cigarettes     Start date: 1963     Quit date: 1999     Years since quittin.2   • Smokeless tobacco: Never   Vaping Use   • Vaping Use: Never used   Substance and Sexual Activity   • Alcohol use: Never   • Drug use: Never   • Sexual activity: Not Currently     Partners: Female     Allergies   Allergen Reactions   • Hydrocodone Irritability   • Keflex [Cephalexin] Rash     Current Outpatient Medications   Medication Sig Dispense Refill   • allopurinol (ZYLOPRIM) 100 MG tablet Take 1 tablet by mouth Daily.     • aspirin 81 MG chewable tablet Chew 1 tablet Daily.     • cetirizine (zyrTEC) 10 MG tablet Take 1 tablet by mouth Daily.     • clindamycin (CLEOCIN) 150 MG capsule TAKE 4 CAPSULES BY MOUTH ONE HOUR BEFORE APPOINTMENT     • ezetimibe (ZETIA) 10 MG tablet Take 1 tablet by mouth Daily.     • famotidine (PEPCID) 20 MG tablet Take 1 tablet by mouth 2 (Two) Times a Day.     • fluticasone (FLONASE) 50 MCG/ACT nasal spray INSTILL 2 PUFFS INTO EACH NOSTRIL ONCE DAILY     • Fluticasone-Salmeterol (WIXELA INHUB IN) Inhale 250 mcg 2 (Two) Times a Day.     • glipizide (GLUCOTROL XL) 2.5 MG 24 hr tablet Take 1 tablet by mouth 2 (Two) Times a Day.     • hydroCHLOROthiazide (MICROZIDE) 12.5 MG capsule Take 1 capsule by mouth Every Morning.     • hydroxychloroquine (PLAQUENIL) 200 MG tablet Take 1 tablet by mouth 2 (Two) Times a Day.     • ipratropium-albuterol (DUO-NEB) 0.5-2.5 mg/3 ml nebulizer Take 3 mL by nebulization 4 (Four) Times a Day As Needed for Wheezing for up to 90 days. 360 mL 3   • losartan (Cozaar) 100 MG tablet Take 1 tablet by mouth Daily. 90 tablet 3   • Misc Natural Products (BLACK CHERRY CONCENTRATE PO) Take  by mouth Daily.     • montelukast (SINGULAIR) 10 MG tablet Take 1 tablet by mouth Every Night.     •  Nintedanib Esylate (Ofev) 150 MG capsule Take 150 mg by mouth 2 (Two) Times a Day. 60 capsule 5   • Omega-3 Fatty Acids (fish oil) 1200 MG capsule capsule Take 1 capsule by mouth Daily.     • Red Yeast Rice Extract 600 MG capsule Take  by mouth Daily.     • tiotropium bromide monohydrate (Spiriva Respimat) 2.5 MCG/ACT aerosol solution inhaler Inhale 2 puffs Daily. 2 each 0   • Ventolin  (90 Base) MCG/ACT inhaler Inhale 2 puffs Every 6 (Six) Hours As Needed.     • vitamin B-12 (CYANOCOBALAMIN) 1000 MCG tablet Take 1 tablet by mouth Daily.     • VITAMIN D, CHOLECALCIFEROL, PO Take  by mouth Daily.       No current facility-administered medications for this visit.     Review of Systems   Constitutional: Negative.    Skin:        Painful toenails   Neurological: Positive for numbness.   All other systems reviewed and are negative.      OBJECTIVE     Vitals:    23 1110   BP: (!) 162/112   Pulse:    Temp:    SpO2:        Body mass index is 40.45 kg/m².    No results found for: HGBA1C    Lab Results   Component Value Date    GLUCOSE 110 (H) 2022    CALCIUM 9.8 2022     2022    K 4.3 2022    CO2 27.9 2022     2022    BUN 11 2022    CREATININE 1.24 2022    EGFRIFNONA 60 (L) 2021    BCR 8.9 2022    ANIONGAP 10.1 2022       Patient seen in no apparent distress.      PHYSICAL EXAM:     Foot/Ankle Exam:       General:   Diabetic Foot Exam Performed    Appearance: obesity and elderly    Orientation: AAOx3    Affect: appropriate    Gait: unimpaired    Shoe Gear:  Casual shoes    VASCULAR      Right Foot Vascularity   Normal vascular exam    Dorsalis pedis:  2+  Posterior tibial:  2+  Skin Temperature: warm    Edema Gradin+ and non-pitting  CFT:  < 3 seconds  Pedal Hair Growth:  Absent  Varicosities: mild varicosities       Left Foot Vascularity   Normal vascular exam    Dorsalis pedis:  2+  Posterior tibial:  2+  Skin Temperature: warm     Edema Gradin+ and non-pitting  CFT:  < 3 seconds  Pedal Hair Growth:  Absent  Varicosities: mild varicosities        NEUROLOGIC     Right Foot Neurologic   Light touch sensation:  Diminished  Vibratory sensation:  Diminished  Hot/Cold sensation: diminished    Protective Sensation using Tolna-Shun Monofilament:  2     Left Foot Neurologic   Light touch sensation:  Diminished  Vibratory sensation:  Diminished  Protective Sensation using Tolna-Shun Monofilament:  2     MUSCLE STRENGTH     Right Foot Muscle Strength   Foot dorsiflexion:  4  Foot plantar flexion:  4  Foot inversion:  4  Foot eversion:  4     Left Foot Muscle Strength   Foot dorsiflexion:  4  Foot plantar flexion:  4  Foot inversion:  4  Foot eversion:  4     RANGE OF MOTION      Right Foot Range of Motion   Foot and ankle ROM within normal limits       Left Foot Range of Motion   Foot and ankle ROM within normal limits       DERMATOLOGIC     Right Foot Dermatologic   Skin: skin intact    Nails: onychomycosis, abnormally thick, subungual debris and dystrophic nails    Nails comment:  Toenails 1, 2     Left Foot Dermatologic   Skin: skin intact    Nails: onychomycosis, abnormally thick, subungual debris, dystrophic nails and ingrown toenail    Nails comment:  Toenails 1, 2, 3, 4      Diabetic Foot Exam Performed      ASSESSMENT/PLAN     Diagnoses and all orders for this visit:    1. Neuropathy (Primary)    2. Non-insulin dependent type 2 diabetes mellitus    3. Diabetic foot    4. Foot pain, bilateral    5. Onychomycosis    6. Onychocryptosis        Comprehensive lower extremity examination and evaluation was performed.    Discussed findings and treatment plan including risks, benefits, and treatment options with patient in detail. Patient agreed with treatment plan.    Medications and allergies reviewed.  Reviewed available blood glucose and HgB A1C lab values along with other pertinent labs.  These were discussed with the patient as to  their importance of diabetic maintenance.    Toenails 1, 2 on Right and 1, 2, 3, 4 on Left were debrided with nail nippers then filed with a Renatomel nail doris.  Patient tolerated procedure well without complications.    Diabetic foot exam performed and documented this date, compliant with CQM required standards. Detail of findings as noted in physical exam.  Lower extremity Neurologic exam for diabetic patient performed and documented this date, compliant with PQRS required standards. Detail of findings as noted in physical exam.  Advised patient importance of good routine lower extremity hygiene. Advised patient importance of evaluating for intact skin and pain free nail borders.  Advised patient to use mirror to evaluate plantar/ soles of feet for better visualization. Advised patient monitor and phone office to be seen if any cracking to skin, open lesions, painful nail borders or if nails become elongated prior to next visit. Advised patient importance of daily cleansing of lower extremities, followed by good skin cream to maintain normal hydration of skin. Also advised patient importance of close daily monitoring of blood sugar. Advised to regulate diet and medications to maintain control of blood sugar in optimal range. Contact primary care provider if difficulties maintaining blood sugar levels.  Advised Patient of presence of Diabetes Mellitus condition.  Advised Patient risk of progression and worsening or improvement, then return of condition.  Will monitor condition for any change in future. Treat with most appropriate treatment pending status of condition.  Counseled and advised patient extensively on nature and ramifications of diabetes. Standard instructions given to patient for good diabetic foot care and maintenance. Advised importance of careful monitoring to avoid break down and complications secondary to diabetes. Advised patient importance of strict maintenance of blood sugar control. Advised  patient of possible ominous results from neglect of condition, i.e.: amputation/ loss of digits, feet and legs, or even death.  Patient states understands counseling, will monitor closely, continue good hygiene and routine diabetic foot care. Patient will contact office should they have any questions or problems.      An After Visit Summary was printed and given to the patient at discharge, including (if requested) any available informative/educational handouts regarding diagnosis, treatment, or medications. All questions were answered to patient/family satisfaction. Should symptoms fail to improve or worsen they agree to call or return to clinic or to go to the Emergency Department. Discussed the importance of following up with any needed screening tests/labs/specialist appointments and any requested follow-up recommended by me today. Importance of maintaining follow-up discussed and patient accepts that missed appointments can delay diagnosis and potentially lead to worsening of conditions.    Return in about 9 weeks (around 6/9/2023) for Toenail Care., or sooner if acute issues arise.    This document has been electronically signed by Richie Watson DPM on April 7, 2023 11:28 EDT

## 2023-05-01 NOTE — PROGRESS NOTES
Primary Care Provider  Jaimie Stallworth APRN     Referring Provider  No ref. provider found     Chief Complaint  COPD, FILOMENA (obstructive sleep apnea) , Pulmonary fibrosis , and Follow-up (4 Month, Chest X-Ray/ PFT results)    Subjective          History of Presenting Illness  Patient is a 78-year-old male, patient Dr. Jensen's who presents for management of combined pulmonary fibrosis with emphysema who presents for a follow-up visit today.  Patient states that since last office visit he has started Ofev.  Patient states that since starting Ofev his breathing has significantly improved.  Patient states that he is taking Wixela and Spiriva every day as prescribed and uses albuterol inhaler and DuoNeb nebulizer treatments as needed.  Patient is also taking Singulair, Zyrtec, and Flonase nasal spray and is under the care of an allergist and is receiving allergy shots for seasonal allergies.  Patient states that he is using his CPAP machine when he sleeps and receives his supplies through [employer].  Patient states that he was under the care of Dr. Badillo, sleep specialist however she has retired and needs our office to take over CPAP management.  Patient denies any morning headaches or excessive daytime sleepiness. Patient denies fever, chills, night sweats, swollen glands in the head and neck, unintentional weight loss, hemoptysis, purulent sputum production, dysphagia, chest pain, palpitations, chest tightness, abdominal pain, nausea, vomiting, and diarrhea.  Patient also denies any myalgias, changes in sense of taste and/or smell, sore throat, any other coronavirus or flu-like symptoms.  Patient denies any leg swelling, orthopnea, paroxysmal nocturnal dyspnea.  Patient is able to perform activities of daily living.        Review of Systems   Constitutional: Negative for activity change, appetite change, chills, diaphoresis, fatigue, fever, unexpected weight gain and unexpected weight loss.        Negative for  Insomnia   HENT: Negative for congestion (Nasal), mouth sores, nosebleeds, postnasal drip, sore throat, swollen glands and trouble swallowing.         Negative for Thrush  Negative for Hoarseness  Negative for Allergies/Hay Fever  Negative for Recent Head injury  Negative for Ear Fullness  Negative for Nasal or Sinus pain  Negative for Dry lips  Negative for Nasal discharge   Respiratory: Positive for shortness of breath (improved since starting Ofev per patient report). Negative for apnea, cough, chest tightness and wheezing.         Negative for Hemoptysis  Negative for Pleuritic pain   Cardiovascular: Negative for chest pain, palpitations and leg swelling.        Negative for Claudication  Negative for Cyanosis  Negative for Dyspnea on exertion   Gastrointestinal: Negative for abdominal pain, diarrhea, nausea, vomiting and GERD.   Musculoskeletal: Negative for joint swelling and myalgias.        Negative for Joint pain  Negative for Joint stiffness   Skin: Negative for color change, dry skin, pallor and rash.   Neurological: Negative for syncope, weakness and headache.   Hematological: Negative for adenopathy. Does not bruise/bleed easily.        Family History   Problem Relation Age of Onset   • Heart failure Mother    • Cancer Father         Passed with lung cancer        Social History     Socioeconomic History   • Marital status:    Tobacco Use   • Smoking status: Former     Packs/day: 1.50     Years: 40.00     Pack years: 60.00     Types: Cigarettes     Start date: 1963     Quit date: 1999     Years since quittin.3     Passive exposure: Past   • Smokeless tobacco: Never   Vaping Use   • Vaping Use: Never used   Substance and Sexual Activity   • Alcohol use: Never   • Drug use: Never   • Sexual activity: Not Currently     Partners: Female        Past Medical History:   Diagnosis Date   • Asthma    • COPD (chronic obstructive pulmonary disease)    • Coronary artery disease    • Coronary  artery disease involving native coronary artery of native heart without angina pectoris 11/29/2021     Status post angioplasty and stent placement in 1999 (details not available)   • Diabetes mellitus    • Difficulty walking 2022   • Gout 2022   • Hyperlipidemia    • Hypertension    • Pneumonia 2019   • Primary central sleep apnea    • Sleep apnea    • Sleep apnea, obstructive         Immunization History   Administered Date(s) Administered   • COVID-19 (DOREEN) 04/08/2021   • Fluzone High-Dose 65+yrs 01/18/2023   • Influenza LAIV (Nasal) 11/06/2018, 10/02/2019   • Pneumococcal Conjugate 13-Valent (PCV13) 04/12/2022   • Pneumococcal Conjugate 20-Valent (PCV20) 03/01/2023   • Pneumococcal Polysaccharide (PPSV23) 11/20/2013       Allergies   Allergen Reactions   • Hydrocodone Irritability   • Keflex [Cephalexin] Rash          Current Outpatient Medications:   •  allopurinol (ZYLOPRIM) 100 MG tablet, Take 1 tablet by mouth Daily., Disp: , Rfl:   •  aspirin 81 MG chewable tablet, Chew 1 tablet Daily., Disp: , Rfl:   •  cetirizine (zyrTEC) 10 MG tablet, Take 1 tablet by mouth Daily., Disp: , Rfl:   •  ezetimibe (ZETIA) 10 MG tablet, Take 1 tablet by mouth Daily., Disp: , Rfl:   •  famotidine (PEPCID) 20 MG tablet, Take 1 tablet by mouth 2 (Two) Times a Day., Disp: , Rfl:   •  fluticasone (FLONASE) 50 MCG/ACT nasal spray, INSTILL 2 PUFFS INTO EACH NOSTRIL ONCE DAILY, Disp: , Rfl:   •  Fluticasone-Salmeterol (WIXELA INHUB IN), Inhale 250 mcg 2 (Two) Times a Day., Disp: , Rfl:   •  glipizide (GLUCOTROL XL) 2.5 MG 24 hr tablet, Take 1 tablet by mouth 2 (Two) Times a Day., Disp: , Rfl:   •  hydroCHLOROthiazide (MICROZIDE) 12.5 MG capsule, Take 1 capsule by mouth Every Morning., Disp: , Rfl:   •  hydroxychloroquine (PLAQUENIL) 200 MG tablet, Take 1 tablet by mouth 2 (Two) Times a Day., Disp: , Rfl:   •  ipratropium-albuterol (DUO-NEB) 0.5-2.5 mg/3 ml nebulizer, Take 3 mL by nebulization 4 (Four) Times a Day As Needed for  Wheezing for up to 90 days., Disp: 360 mL, Rfl: 3  •  losartan (Cozaar) 100 MG tablet, Take 1 tablet by mouth Daily., Disp: 90 tablet, Rfl: 3  •  Misc Natural Products (BLACK CHERRY CONCENTRATE PO), Take  by mouth Daily., Disp: , Rfl:   •  montelukast (SINGULAIR) 10 MG tablet, Take 1 tablet by mouth Every Night., Disp: , Rfl:   •  Nintedanib Esylate (Ofev) 150 MG capsule, Take 150 mg by mouth 2 (Two) Times a Day., Disp: 60 capsule, Rfl: 5  •  Omega-3 Fatty Acids (fish oil) 1200 MG capsule capsule, Take 1 capsule by mouth Daily., Disp: , Rfl:   •  Red Yeast Rice Extract 600 MG capsule, Take  by mouth Daily., Disp: , Rfl:   •  tiotropium bromide monohydrate (Spiriva Respimat) 2.5 MCG/ACT aerosol solution inhaler, Inhale 2 puffs Daily., Disp: 2 each, Rfl: 0  •  Ventolin  (90 Base) MCG/ACT inhaler, Inhale 2 puffs Every 6 (Six) Hours As Needed., Disp: , Rfl:   •  vitamin B-12 (CYANOCOBALAMIN) 1000 MCG tablet, Take 1 tablet by mouth Daily., Disp: , Rfl:   •  VITAMIN D, CHOLECALCIFEROL, PO, Take  by mouth Daily., Disp: , Rfl:   •  clindamycin (CLEOCIN) 150 MG capsule, TAKE 4 CAPSULES BY MOUTH ONE HOUR BEFORE APPOINTMENT (Patient not taking: Reported on 5/10/2023), Disp: , Rfl:      Objective     Physical Exam  Vital Signs:   Obese, WDWN, Alert, NAD.    HEENT:  PERRL, EOMI.  OP, nares clear, no sinus tenderness  Neck:  Supple, no JVD, no thyromegaly.  Lymph: no axillary, cervical, supraclavicular lymphadenopathy noted bilaterally  Chest:  Mildly decreased breath sounds throughout. No wheezes, rales, or rhonchi appreciated.  Normal work of breathing noted.  Patient is able speak full sentences without difficulty.  CV: RRR, no MGR, pulses 2+, equal.  Abd:  Soft, NT, ND, + BS, no HSM  EXT:  no clubbing, no cyanosis, no edema, no joint tenderness  Neuro:  A&Ox3, CN grossly intact, no focal deficits.  Skin: No rashes or lesions noted.    /91 (BP Location: Left arm, Patient Position: Sitting, Cuff Size: Large Adult)   " Pulse 94   Temp 98 °F (36.7 °C) (Tympanic)   Resp 20   Ht 180.3 cm (70.98\")   Wt 129 kg (283 lb 9.6 oz)   SpO2 98% Comment: room air  BMI 39.57 kg/m²         Result Review :   I have reviewed my last office visit note.    Procedures:              Assessment and Plan      Assessment:  1. Combined pulmonary fibrosis with emphysema.  FEV1 of 71% predicted.  Patient on triple inhaler therapy. Recent chest CT scan showing progression of pulmonary fibrosis.   2.  Chronic dyspnea.  3.  Occupational exposures to multiple chemicals inhalants.  4.  Obesity with BMI of 39.5.  5.  Obstructive sleep apnea.  Patient nightly CPAP that was managed by Dr. Badillo, sleep specialist, however she has retired per pt report and needs our office to take over.  6.  History of COVID-19 back in December 2022.  Treated with Paxlovid by PCP per patient report.  7.  Seasonal allergies.  Patient is under the care of an allergist and is receiving allergy shots.  8.  Tobacco abuse of cigarettes in remission.  Not eligible for lung cancer screening as patient reports that he quit smoking 1999.        Plan:  1.  Continue Ofev as prescribed.  2.  Patient did not have labs completed yet.  Will reorder CBC and CMP for medication monitoring. Orders placed today.  Patient is advised to have labs completed as soon as possible.  Patient verbalized understanding and compliance.  3.  Continue Wixela [facility] Spiriva [facility] as prescribed and rinse mouth out after each use.  4. Continue albuterol inhaler and Duoneb nebulizer treatments as needed.  5.  Continue Singulair, Zyrtec, and Flonase nasal spray as prescribed and follow-up with allergist and continue allergy shots scheduled.  6.  Patient states that he was under the care of Dr. Badillo, sleep specialist however she has retired and needs our office to take over CPAP management. Continue CPAP at night and with naps at current settings and clean mask and tubing daily.  Will " request a copy of CPAP compliance report and notify patient if any changes need to be made.  7.  I counseled the patient on diet and exercise. Patient's BMI and weight were discussed.  The patient was counseled on initiating and intensifying attempts to lose weight.  Patient was counseled about the risks of obesity and advised to decrease caloric intake by 500 calories a day, eat three small meals a day and advised to minimize snacking.  I recommended 30-60 minutes of daily exercise.  8.  Vaccination status: patient reports they are up-to-date with flu, pneumonia, and Covid vaccines.  Patient is advised to continue to follow CDC recommendations such as social distancing wearing a mask and washing hands for at least 20 seconds.  9.  Smoking status: Patient is a former cigarette smoker.  Not eligible for lung cancer screening as patient reports that he quit smoking 1999.  10.  Patient to call the office, 911, or go to the ER with new or worsening symptoms.  11.  Follow-up in 4 months, sooner if needed.            Follow Up   Return for 4 months with Dr. Jensen.  Patient was given instructions and counseling regarding his condition or for health maintenance advice. Please see specific information pulled into the AVS if appropriate.

## 2023-05-01 NOTE — PATIENT INSTRUCTIONS
Pulmonary Fibrosis  Pulmonary fibrosis is a type of lung disease that causes scarring. Over time, the scar tissue builds up in the air sacs of your lungs (alveoli). This makes it hard for you to breathe because less oxygen gets into your bloodstream.  Scarring from pulmonary fibrosis is permanent and may lead to other serious health problems.  What are the causes?  There are many different causes of pulmonary fibrosis. In some cases, the cause is not known. This is called idiopathic pulmonary fibrosis. Other causes include:  Exposure to chemicals and substances found in agricultural, farm, construction, or factory work. These include mold, asbestos, silica, metal dusts, and toxic fumes.  Sarcoidosis. In this disease, areas of inflammatory cells (granulomas) form and most often affect the lungs.  Autoimmune diseases. These include diseases such as rheumatoid arthritis, systemic sclerosis, or connective tissue disease.  Taking certain medicines. These include drugs used in radiation therapy or used to treat seizures, heart problems, and some infections.  What increases the risk?  You are more likely to develop this condition if:  You have a family history of the disease.  You are an older person. The condition is more common in older adults.  You have a history of smoking.  You have a job that exposes you to certain chemicals.  You have gastroesophageal reflux disease (GERD).  What are the signs or symptoms?  Symptoms of this condition include:  Difficulty breathing that gets worse with activity.  Shortness of breath (dyspnea).  Dry, hacking cough.  Rapid, shallow breathing during exercise or while at rest.  Other symptoms may include:  Loss of appetite or weight loss  Tiredness (fatigue) or weakness.  Bluish skin and lips.  Rounded and enlarged fingertips (clubbing).  How is this diagnosed?  This condition may be diagnosed based on:  Your symptoms and medical history.  A physical exam.  You may also have tests,  including:  A test that involves looking inside your lungs with an instrument (bronchoscopy).  Imaging studies of your lungs and heart.  Tests to measure how well you are breathing (pulmonary function tests).  Blood tests.  Tests to see how well your lungs work while you are walking (pulmonary stress test).  A procedure to remove a lung tissue sample to look at it under a microscope (biopsy).  How is this treated?  There is no cure for pulmonary fibrosis. Treatment focuses on managing symptoms and preventing scarring from getting worse. This may include:  Medicines, such as:  Steroids to prevent permanent lung changes.  Medicines to suppress your body's defense system (immune system).  Medicines to help with lung function by reducing inflammation or scarring.  Ongoing monitoring with X-rays and lab work.  Oxygen therapy.  Pulmonary rehabilitation.  Surgery. In some cases, a lung transplant is possible.  Follow these instructions at home:  Medicines  Take over-the-counter and prescription medicines only as told by your health care provider.  Keep your vaccinations up to date as recommended by your health care provider.  Activity  Get regular exercise, but do not pick activities that are too strenuous for you. Ask your health care provider what activities are safe for you.  If you have physical limitations, you may get exercise by walking, using a stationary bike, or doing chair exercises.  Ask your health care provider about using oxygen while exercising.  Do breathing exercises as told by your health care provider.  Plan rest periods when you get tired.  General instructions  Do not use any products that contain nicotine or tobacco. These products include cigarettes, chewing tobacco, and vaping devices, such as e-cigarettes. If you need help quitting, ask your health care provider.  If you are exposed to chemicals and substances at work, make sure that you wear a mask or respirator at all times.  Learn to manage  stress. If you need help to do this, ask your health care provider.  Join a pulmonary rehabilitation program or a support group for people with pulmonary fibrosis.  Eat small meals often so you do not get too full. Overeating can make breathing trouble worse.  Maintain a healthy weight. Lose weight if you need to.  Keep all follow-up visits. This is important.  Where to find more information  American Lung Association: www.lung.org  National Heart, Lung, and Blood [place]: www.nhlbi.nih.gov  Pulmonary Fibrosis Foundation: pulmonaryfibrosis.org  Contact a health care provider if:  You have symptoms that do not get better with medicines.  You are not able to be as active as usual.  You have trouble taking a deep breath.  You have a fever or chills.  You have blue lips or skin.  You have a lot of headaches.  You cough up mucus that is dark in color.  You have feelings of depression or sadness.  You are unable to sleep because it is hard to breathe.  Get help right away if:  Your symptoms suddenly worsen.  You have chest pain.  You cough up blood.  You get very confused or sleepy.  These symptoms may be an emergency. Get help right away. Call 911.  Do not wait to see if the symptoms will go away.  Do not drive yourself to the hospital.  Summary  Pulmonary fibrosis is a type of lung disease that causes scar tissue to build up in the air sacs of your lungs (alveoli) over time. This makes it hard for you to breathe because less oxygen gets into your bloodstream.  Scarring from pulmonary fibrosis is permanent and may lead to other serious health problems.  You are more likely to develop this condition if you have a family history of the condition or a job that exposes you to certain chemicals.  There is no cure for pulmonary fibrosis. Treatment focuses on managing symptoms and preventing scarring from getting worse.  This information is not intended to replace advice given to you by your health care provider. Make sure  you discuss any questions you have with your health care provider.  Document Revised: 08/09/2022 Document Reviewed: 08/09/2022  Elsevier Patient Education © 2022 [employer] Inc.

## 2023-05-10 ENCOUNTER — OFFICE VISIT (OUTPATIENT)
Dept: PULMONOLOGY | Facility: CLINIC | Age: 78
End: 2023-05-10
Payer: MEDICARE

## 2023-05-10 ENCOUNTER — LAB (OUTPATIENT)
Dept: LAB | Facility: HOSPITAL | Age: 78
End: 2023-05-10
Payer: MEDICARE

## 2023-05-10 ENCOUNTER — TELEPHONE (OUTPATIENT)
Dept: PULMONOLOGY | Facility: CLINIC | Age: 78
End: 2023-05-10

## 2023-05-10 VITALS
HEIGHT: 71 IN | HEART RATE: 94 BPM | BODY MASS INDEX: 39.7 KG/M2 | TEMPERATURE: 98 F | SYSTOLIC BLOOD PRESSURE: 152 MMHG | DIASTOLIC BLOOD PRESSURE: 91 MMHG | WEIGHT: 283.6 LBS | OXYGEN SATURATION: 98 % | RESPIRATION RATE: 20 BRPM

## 2023-05-10 DIAGNOSIS — G47.33 OSA (OBSTRUCTIVE SLEEP APNEA): ICD-10-CM

## 2023-05-10 DIAGNOSIS — Z86.16 HISTORY OF COVID-19: ICD-10-CM

## 2023-05-10 DIAGNOSIS — E66.01 MORBID OBESITY WITH BMI OF 40.0-44.9, ADULT: ICD-10-CM

## 2023-05-10 DIAGNOSIS — J30.2 SEASONAL ALLERGIES: ICD-10-CM

## 2023-05-10 DIAGNOSIS — J84.10 PULMONARY FIBROSIS: ICD-10-CM

## 2023-05-10 DIAGNOSIS — F17.201 TOBACCO ABUSE, IN REMISSION: ICD-10-CM

## 2023-05-10 DIAGNOSIS — R06.09 CHRONIC DYSPNEA: ICD-10-CM

## 2023-05-10 DIAGNOSIS — J30.2 SEASONAL ALLERGIES: Primary | ICD-10-CM

## 2023-05-10 LAB
ALBUMIN SERPL-MCNC: 4.2 G/DL (ref 3.5–5.2)
ALBUMIN/GLOB SERPL: 1.4 G/DL
ALP SERPL-CCNC: 79 U/L (ref 39–117)
ALT SERPL W P-5'-P-CCNC: 16 U/L (ref 1–41)
ANION GAP SERPL CALCULATED.3IONS-SCNC: 10.5 MMOL/L (ref 5–15)
AST SERPL-CCNC: 17 U/L (ref 1–40)
BASOPHILS # BLD AUTO: 0.02 10*3/MM3 (ref 0–0.2)
BASOPHILS NFR BLD AUTO: 0.2 % (ref 0–1.5)
BILIRUB SERPL-MCNC: 0.8 MG/DL (ref 0–1.2)
BUN SERPL-MCNC: 13 MG/DL (ref 8–23)
BUN/CREAT SERPL: 10.6 (ref 7–25)
CALCIUM SPEC-SCNC: 10.2 MG/DL (ref 8.6–10.5)
CHLORIDE SERPL-SCNC: 103 MMOL/L (ref 98–107)
CO2 SERPL-SCNC: 27.5 MMOL/L (ref 22–29)
CREAT SERPL-MCNC: 1.23 MG/DL (ref 0.76–1.27)
DEPRECATED RDW RBC AUTO: 42 FL (ref 37–54)
EGFRCR SERPLBLD CKD-EPI 2021: 60.8 ML/MIN/1.73
EOSINOPHIL # BLD AUTO: 0.38 10*3/MM3 (ref 0–0.4)
EOSINOPHIL NFR BLD AUTO: 4.2 % (ref 0.3–6.2)
ERYTHROCYTE [DISTWIDTH] IN BLOOD BY AUTOMATED COUNT: 13 % (ref 12.3–15.4)
GLOBULIN UR ELPH-MCNC: 2.9 GM/DL
GLUCOSE SERPL-MCNC: 138 MG/DL (ref 65–99)
HCT VFR BLD AUTO: 44.4 % (ref 37.5–51)
HGB BLD-MCNC: 15.6 G/DL (ref 13–17.7)
IMM GRANULOCYTES # BLD AUTO: 0.04 10*3/MM3 (ref 0–0.05)
IMM GRANULOCYTES NFR BLD AUTO: 0.4 % (ref 0–0.5)
LYMPHOCYTES # BLD AUTO: 2.02 10*3/MM3 (ref 0.7–3.1)
LYMPHOCYTES NFR BLD AUTO: 22.3 % (ref 19.6–45.3)
MCH RBC QN AUTO: 30.9 PG (ref 26.6–33)
MCHC RBC AUTO-ENTMCNC: 35.1 G/DL (ref 31.5–35.7)
MCV RBC AUTO: 87.9 FL (ref 79–97)
MONOCYTES # BLD AUTO: 0.5 10*3/MM3 (ref 0.1–0.9)
MONOCYTES NFR BLD AUTO: 5.5 % (ref 5–12)
NEUTROPHILS NFR BLD AUTO: 6.1 10*3/MM3 (ref 1.7–7)
NEUTROPHILS NFR BLD AUTO: 67.4 % (ref 42.7–76)
NRBC BLD AUTO-RTO: 0 /100 WBC (ref 0–0.2)
PLATELET # BLD AUTO: 236 10*3/MM3 (ref 140–450)
PMV BLD AUTO: 10.4 FL (ref 6–12)
POTASSIUM SERPL-SCNC: 4.4 MMOL/L (ref 3.5–5.2)
PROT SERPL-MCNC: 7.1 G/DL (ref 6–8.5)
RBC # BLD AUTO: 5.05 10*6/MM3 (ref 4.14–5.8)
SODIUM SERPL-SCNC: 141 MMOL/L (ref 136–145)
WBC NRBC COR # BLD: 9.06 10*3/MM3 (ref 3.4–10.8)

## 2023-05-10 PROCEDURE — 80053 COMPREHEN METABOLIC PANEL: CPT

## 2023-05-10 PROCEDURE — 85025 COMPLETE CBC W/AUTO DIFF WBC: CPT

## 2023-05-10 PROCEDURE — 36415 COLL VENOUS BLD VENIPUNCTURE: CPT

## 2023-05-10 NOTE — TELEPHONE ENCOUNTER
Please request copy of CPAP compliance report from [facility]. Pt was seeing Dr. Badillo, however she retired and needs our office to take CPAP care.  Thank you.

## 2023-07-06 PROBLEM — R00.2 PALPITATIONS: Status: ACTIVE | Noted: 2023-07-06

## 2023-09-01 ENCOUNTER — LAB (OUTPATIENT)
Dept: LAB | Facility: HOSPITAL | Age: 78
End: 2023-09-01
Payer: MEDICARE

## 2023-09-01 ENCOUNTER — TRANSCRIBE ORDERS (OUTPATIENT)
Dept: LAB | Facility: HOSPITAL | Age: 78
End: 2023-09-01
Payer: OTHER GOVERNMENT

## 2023-09-01 DIAGNOSIS — Z86.16 HISTORY OF COVID-19: ICD-10-CM

## 2023-09-01 DIAGNOSIS — G47.33 OSA (OBSTRUCTIVE SLEEP APNEA): ICD-10-CM

## 2023-09-01 DIAGNOSIS — F17.201 TOBACCO ABUSE, IN REMISSION: ICD-10-CM

## 2023-09-01 DIAGNOSIS — E66.9 OBESITY (BMI 30-39.9): ICD-10-CM

## 2023-09-01 DIAGNOSIS — J84.10 PULMONARY FIBROSIS: ICD-10-CM

## 2023-09-01 DIAGNOSIS — J30.2 SEASONAL ALLERGIES: ICD-10-CM

## 2023-09-01 DIAGNOSIS — N18.31 STAGE 3A CHRONIC KIDNEY DISEASE: ICD-10-CM

## 2023-09-01 DIAGNOSIS — R06.09 CHRONIC DYSPNEA: ICD-10-CM

## 2023-09-01 DIAGNOSIS — N18.31 STAGE 3A CHRONIC KIDNEY DISEASE: Primary | ICD-10-CM

## 2023-09-01 LAB
ALBUMIN SERPL-MCNC: 4.7 G/DL (ref 3.5–5.2)
ALBUMIN/GLOB SERPL: 1.9 G/DL
ALP SERPL-CCNC: 80 U/L (ref 39–117)
ALT SERPL W P-5'-P-CCNC: 12 U/L (ref 1–41)
ANION GAP SERPL CALCULATED.3IONS-SCNC: 11 MMOL/L (ref 5–15)
AST SERPL-CCNC: 20 U/L (ref 1–40)
BASOPHILS # BLD AUTO: 0.03 10*3/MM3 (ref 0–0.2)
BASOPHILS NFR BLD AUTO: 0.4 % (ref 0–1.5)
BILIRUB SERPL-MCNC: 1 MG/DL (ref 0–1.2)
BILIRUB UR QL STRIP: NEGATIVE
BUN SERPL-MCNC: 16 MG/DL (ref 8–23)
BUN/CREAT SERPL: 11.8 (ref 7–25)
CALCIUM SPEC-SCNC: 10.4 MG/DL (ref 8.6–10.5)
CHLORIDE SERPL-SCNC: 101 MMOL/L (ref 98–107)
CLARITY UR: CLEAR
CO2 SERPL-SCNC: 26 MMOL/L (ref 22–29)
COLOR UR: YELLOW
CREAT SERPL-MCNC: 1.36 MG/DL (ref 0.76–1.27)
CREAT UR-MCNC: 85.7 MG/DL
DEPRECATED RDW RBC AUTO: 41.9 FL (ref 37–54)
EGFRCR SERPLBLD CKD-EPI 2021: 53.9 ML/MIN/1.73
EOSINOPHIL # BLD AUTO: 0.29 10*3/MM3 (ref 0–0.4)
EOSINOPHIL NFR BLD AUTO: 3.7 % (ref 0.3–6.2)
ERYTHROCYTE [DISTWIDTH] IN BLOOD BY AUTOMATED COUNT: 12.9 % (ref 12.3–15.4)
GLOBULIN UR ELPH-MCNC: 2.5 GM/DL
GLUCOSE SERPL-MCNC: 67 MG/DL (ref 65–99)
GLUCOSE UR STRIP-MCNC: NEGATIVE MG/DL
HCT VFR BLD AUTO: 42.9 % (ref 37.5–51)
HGB BLD-MCNC: 14.7 G/DL (ref 13–17.7)
HGB UR QL STRIP.AUTO: NEGATIVE
IMM GRANULOCYTES # BLD AUTO: 0.02 10*3/MM3 (ref 0–0.05)
IMM GRANULOCYTES NFR BLD AUTO: 0.3 % (ref 0–0.5)
KETONES UR QL STRIP: NEGATIVE
LEUKOCYTE ESTERASE UR QL STRIP.AUTO: NEGATIVE
LYMPHOCYTES # BLD AUTO: 1.8 10*3/MM3 (ref 0.7–3.1)
LYMPHOCYTES NFR BLD AUTO: 22.8 % (ref 19.6–45.3)
MCH RBC QN AUTO: 30.9 PG (ref 26.6–33)
MCHC RBC AUTO-ENTMCNC: 34.3 G/DL (ref 31.5–35.7)
MCV RBC AUTO: 90.3 FL (ref 79–97)
MONOCYTES # BLD AUTO: 0.49 10*3/MM3 (ref 0.1–0.9)
MONOCYTES NFR BLD AUTO: 6.2 % (ref 5–12)
NEUTROPHILS NFR BLD AUTO: 5.28 10*3/MM3 (ref 1.7–7)
NEUTROPHILS NFR BLD AUTO: 66.6 % (ref 42.7–76)
NITRITE UR QL STRIP: NEGATIVE
NRBC BLD AUTO-RTO: 0 /100 WBC (ref 0–0.2)
PH UR STRIP.AUTO: 7 [PH] (ref 5–8)
PHOSPHATE SERPL-MCNC: 2.5 MG/DL (ref 2.5–4.5)
PLATELET # BLD AUTO: 225 10*3/MM3 (ref 140–450)
PMV BLD AUTO: 10.5 FL (ref 6–12)
POTASSIUM SERPL-SCNC: 4.4 MMOL/L (ref 3.5–5.2)
PROT ?TM UR-MCNC: 7.2 MG/DL
PROT SERPL-MCNC: 7.2 G/DL (ref 6–8.5)
PROT UR QL STRIP: NEGATIVE
PROT/CREAT UR: 0.08 MG/G{CREAT}
PTH-INTACT SERPL-MCNC: 37.1 PG/ML (ref 15–65)
RBC # BLD AUTO: 4.75 10*6/MM3 (ref 4.14–5.8)
SODIUM SERPL-SCNC: 138 MMOL/L (ref 136–145)
SP GR UR STRIP: 1.01 (ref 1–1.03)
UROBILINOGEN UR QL STRIP: NORMAL
WBC NRBC COR # BLD: 7.91 10*3/MM3 (ref 3.4–10.8)

## 2023-09-01 PROCEDURE — 80053 COMPREHEN METABOLIC PANEL: CPT

## 2023-09-01 PROCEDURE — 84100 ASSAY OF PHOSPHORUS: CPT

## 2023-09-01 PROCEDURE — 85025 COMPLETE CBC W/AUTO DIFF WBC: CPT

## 2023-09-01 PROCEDURE — 84156 ASSAY OF PROTEIN URINE: CPT

## 2023-09-01 PROCEDURE — 36415 COLL VENOUS BLD VENIPUNCTURE: CPT

## 2023-09-01 PROCEDURE — 83970 ASSAY OF PARATHORMONE: CPT

## 2023-09-01 PROCEDURE — 81003 URINALYSIS AUTO W/O SCOPE: CPT

## 2023-09-01 PROCEDURE — 82570 ASSAY OF URINE CREATININE: CPT

## 2023-09-22 ENCOUNTER — OFFICE VISIT (OUTPATIENT)
Dept: PODIATRY | Facility: CLINIC | Age: 78
End: 2023-09-22
Payer: MEDICARE

## 2023-09-22 VITALS
OXYGEN SATURATION: 98 % | BODY MASS INDEX: 37.51 KG/M2 | WEIGHT: 262 LBS | HEART RATE: 87 BPM | DIASTOLIC BLOOD PRESSURE: 83 MMHG | TEMPERATURE: 97.4 F | SYSTOLIC BLOOD PRESSURE: 137 MMHG | HEIGHT: 70 IN

## 2023-09-22 DIAGNOSIS — E11.8 DIABETIC FOOT: Primary | ICD-10-CM

## 2023-09-22 DIAGNOSIS — M79.672 FOOT PAIN, BILATERAL: ICD-10-CM

## 2023-09-22 DIAGNOSIS — E11.9 NON-INSULIN DEPENDENT TYPE 2 DIABETES MELLITUS: ICD-10-CM

## 2023-09-22 DIAGNOSIS — G62.9 NEUROPATHY: ICD-10-CM

## 2023-09-22 DIAGNOSIS — B35.1 ONYCHOMYCOSIS: ICD-10-CM

## 2023-09-22 DIAGNOSIS — L60.0 ONYCHOCRYPTOSIS: ICD-10-CM

## 2023-09-22 DIAGNOSIS — M79.671 FOOT PAIN, BILATERAL: ICD-10-CM

## 2023-09-27 ENCOUNTER — OFFICE VISIT (OUTPATIENT)
Dept: PULMONOLOGY | Facility: CLINIC | Age: 78
End: 2023-09-27
Payer: MEDICARE

## 2023-09-27 VITALS
DIASTOLIC BLOOD PRESSURE: 74 MMHG | RESPIRATION RATE: 16 BRPM | HEIGHT: 71 IN | HEART RATE: 88 BPM | BODY MASS INDEX: 35 KG/M2 | SYSTOLIC BLOOD PRESSURE: 143 MMHG | WEIGHT: 250 LBS | OXYGEN SATURATION: 98 %

## 2023-09-27 DIAGNOSIS — F17.201 TOBACCO ABUSE, IN REMISSION: ICD-10-CM

## 2023-09-27 DIAGNOSIS — E66.9 OBESITY (BMI 30-39.9): ICD-10-CM

## 2023-09-27 DIAGNOSIS — J84.10 PULMONARY FIBROSIS: Primary | ICD-10-CM

## 2023-09-27 DIAGNOSIS — J44.9 CHRONIC OBSTRUCTIVE PULMONARY DISEASE, UNSPECIFIED COPD TYPE: ICD-10-CM

## 2023-09-27 DIAGNOSIS — G47.33 OSA (OBSTRUCTIVE SLEEP APNEA): ICD-10-CM

## 2023-09-27 NOTE — PROGRESS NOTES
Primary Care Provider  Jaimie Stallworth APRN     Referring Provider  No ref. 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  He has been doing well with Ofev.  He does have some GI intolerance and has lost about 40 pounds which is helped his dyspnea.  He is doing well on Wixela and Spiriva.  Shortness of breath is at baseline.  He can walk about 1500 feet before getting short of breath.  Dyspnea is moderate severity, worse with activity relieved with rest.  Denies any coughing or wheezing.  Uses his CPAP nightly without issue and is compliant with it per his compliance report.  Denies any snoring or excessive daytime sleepiness. Patient denies any morning headaches or excessive daytime sleepiness.  Patient states that he is needing new CPAP supplies. Patient denies fever, chills, night sweats, swollen glands in the head and neck, unintentional weight loss, hemoptysis, purulent sputum production, dysphagia, chest pain, palpitations, chest tightness, abdominal pain, nausea, vomiting, and diarrhea.  Patient also denies any myalgias, changes in sense of taste and/or smell, sore throat, any other coronavirus or flu-like symptoms.  Patient denies any leg swelling, orthopnea, paroxysmal nocturnal dyspnea.  Patient is able to perform activities of daily living.      Family History   Problem Relation Age of Onset    Heart failure Mother     Cancer Father         Passed with lung cancer        Social History     Socioeconomic History    Marital status:    Tobacco Use    Smoking status: Former     Packs/day: 1.50     Years: 40.00     Pack years: 60.00     Types: Cigarettes     Start date: 1963     Quit date: 1999     Years since quittin.7     Passive exposure: Past    Smokeless tobacco: Never   Vaping Use     Vaping Use: Never used   Substance and Sexual Activity    Alcohol use: Never    Drug use: Never    Sexual activity: Not Currently     Partners: Female        Past Medical History:   Diagnosis Date    Asthma     COPD (chronic obstructive pulmonary disease)     Coronary artery disease     Coronary artery disease involving native coronary artery of native heart without angina pectoris 11/29/2021     Status post angioplasty and stent placement in 1999 (details not available)    Diabetes mellitus     Difficulty walking 2022    Gout 2022    Hyperlipidemia     Hypertension     Pneumonia 2019    Primary central sleep apnea     Sleep apnea     Sleep apnea, obstructive         Immunization History   Administered Date(s) Administered    COVID-19 (Biomonde) 04/08/2021    Fluzone (or Fluarix & Flulaval for VFC) >6mos 11/20/2013, 10/01/2019    Fluzone High-Dose 65+yrs 01/18/2023    Influenza LAIV (Nasal) 11/06/2018, 10/02/2019    PEDS-Pneumococcal Conjugate (PCV7) 03/01/2023    Pneumococcal Conjugate 13-Valent (PCV13) 04/12/2022    Pneumococcal Conjugate 20-Valent (PCV20) 03/01/2023    Pneumococcal Polysaccharide (PPSV23) 11/20/2013    Tdap 02/21/2020       Allergies   Allergen Reactions    Atorvastatin Other (See Comments)    Hydrocodone Irritability    Keflex [Cephalexin] Rash          Current Outpatient Medications:     allopurinol (ZYLOPRIM) 100 MG tablet, Take 1 tablet by mouth Daily., Disp: , Rfl:     aspirin 81 MG chewable tablet, Chew 1 tablet Daily., Disp: , Rfl:     cetirizine (zyrTEC) 10 MG tablet, Take 1 tablet by mouth Daily., Disp: , Rfl:     dilTIAZem CD (CARDIZEM CD) 120 MG 24 hr capsule, Take 1 capsule by mouth Daily., Disp: 90 capsule, Rfl: 3    ezetimibe (ZETIA) 10 MG tablet, Take 1 tablet by mouth Daily., Disp: , Rfl:     famotidine (PEPCID) 20 MG tablet, Take 1 tablet by mouth 2 (Two) Times a Day., Disp: , Rfl:     fluticasone (FLONASE) 50 MCG/ACT nasal spray, INSTILL 2 PUFFS INTO EACH NOSTRIL ONCE DAILY,  Disp: , Rfl:     Fluticasone-Salmeterol (WIXELA INHUB IN), Inhale 250 mcg 2 (Two) Times a Day., Disp: , Rfl:     glipizide (GLUCOTROL XL) 2.5 MG 24 hr tablet, Take 1 tablet by mouth 2 (Two) Times a Day., Disp: , Rfl:     hydroCHLOROthiazide (MICROZIDE) 12.5 MG capsule, Take 1 capsule by mouth Every Morning., Disp: , Rfl:     hydroxychloroquine (PLAQUENIL) 200 MG tablet, Take 1 tablet by mouth 2 (Two) Times a Day., Disp: , Rfl:     ipratropium-albuterol (DUO-NEB) 0.5-2.5 mg/3 ml nebulizer, Take 3 mL by nebulization 4 (Four) Times a Day As Needed for Wheezing for up to 90 days., Disp: 360 mL, Rfl: 3    losartan (Cozaar) 100 MG tablet, Take 1 tablet by mouth Daily., Disp: 90 tablet, Rfl: 3    Misc Natural Products (BLACK CHERRY CONCENTRATE PO), Take  by mouth Daily., Disp: , Rfl:     montelukast (SINGULAIR) 10 MG tablet, Take 1 tablet by mouth Every Night., Disp: , Rfl:     Nintedanib Esylate (Ofev) 150 MG capsule, Take 150 mg by mouth 2 (Two) Times a Day., Disp: 60 capsule, Rfl: 5    Omega-3 Fatty Acids (fish oil) 1200 MG capsule capsule, Take 1 capsule by mouth Daily., Disp: , Rfl:     Red Yeast Rice Extract 600 MG capsule, Take  by mouth Daily., Disp: , Rfl:     tiotropium bromide monohydrate (Spiriva Respimat) 2.5 MCG/ACT aerosol solution inhaler, Inhale 2 puffs Daily., Disp: 2 each, Rfl: 0    Ventolin  (90 Base) MCG/ACT inhaler, Inhale 2 puffs Every 6 (Six) Hours As Needed., Disp: , Rfl:     vitamin B-12 (CYANOCOBALAMIN) 1000 MCG tablet, Take 1 tablet by mouth Daily., Disp: , Rfl:     VITAMIN D, CHOLECALCIFEROL, PO, Take  by mouth Daily., Disp: , Rfl:     clindamycin (CLEOCIN) 150 MG capsule, , Disp: , Rfl:      Objective     Physical Exam  Vital Signs:   Obese, WDWN, Alert, NAD.    HEENT:  PERRL, EOMI.  OP, nares clear  Chest: Mildly decreased breath sounds throughout. No wheezes, rales, or rhonchi appreciated.  Normal work of breathing noted.  Patient is able speak full sentences without difficulty.  CV:  "RRR, no MGR, pulses 2+, equal.  Abd:  Soft, NT, ND, + BS, no HSM, obese  EXT:  no clubbing, no cyanosis, no edema  Neuro:  A&Ox3, CN grossly intact, no focal deficits.  Skin: No rashes or lesions noted.    /74 (BP Location: Left arm, Patient Position: Sitting, Cuff Size: Adult)   Pulse 88   Resp 16   Ht 180.3 cm (71\")   Wt 113 kg (250 lb)   SpO2 98% Comment: Room air  BMI 34.87 kg/m²         Result Review :   Personally reviewed Gina Shirley's last office note.  Chest CT personally reviewed in 2023 showed stable pulmonary fibrosis and emphysema.  PFT personally reviewed in January 2023 showing no change compared to previous year PFT.  CBC and CMP personally reviewed.  No gross abnormalities.  Does have peripheral eosinophilia.    CMP          5/10/2023    10:00 9/1/2023    11:24   CMP   Glucose 138  67    BUN 13  16    Creatinine 1.23  1.36    EGFR 60.8  53.9    Sodium 141  138    Potassium 4.4  4.4    Chloride 103  101    Calcium 10.2  10.4    Total Protein 7.1  7.2    Albumin 4.2  4.7    Globulin 2.9  2.5    Total Bilirubin 0.8  1.0    Alkaline Phosphatase 79  80    AST (SGOT) 17  20    ALT (SGPT) 16  12    Albumin/Globulin Ratio 1.4  1.9    BUN/Creatinine Ratio 10.6  11.8    Anion Gap 10.5  11.0      CBC          5/10/2023    10:00 9/1/2023    11:24   CBC   WBC 9.06  7.91    RBC 5.05  4.75    Hemoglobin 15.6  14.7    Hematocrit 44.4  42.9    MCV 87.9  90.3    MCH 30.9  30.9    MCHC 35.1  34.3    RDW 13.0  12.9    Platelets 236  225      CBC w/diff          8/31/2022    14:07 5/10/2023    10:00   CBC w/Diff   WBC 9.86  9.06    RBC 5.00  5.05    Hemoglobin 15.0  15.6    Hematocrit 43.9  44.4    MCV 87.8  87.9    MCH 30.0  30.9    MCHC 34.2  35.1    RDW 12.7  13.0    Platelets 208  236    Neutrophil Rel % 63.9  67.4    Immature Granulocyte Rel % 0.6  0.4    Lymphocyte Rel % 24.2  22.3    Monocyte Rel % 5.1  5.5    Eosinophil Rel % 5.8  4.2    Basophil Rel % 0.4  0.2           Assessment and Plan  "     Assessment:  1. Combined pulmonary fibrosis with emphysema.  FEV1 of 71% predicted.  Stable on annual PFTs.  Patient on triple inhaler therapy.  January 2023 chest CT did show some worsening pulmonary fibrosis but overall lung function is stable  2.  Chronic dyspnea.  At baseline  3.  Occupational exposures to multiple chemicals inhalants.  4.  Obesity with BMI of 37.8.  Has been actively trying to lose weight  5.  Obstructive sleep apnea.  Compliant with CPAP  6.  Seasonal allergies.  Patient is under the care of an allergist and is receiving allergy shots.  7.  Tobacco abuse of cigarettes in remission.  Not eligible for lung cancer screening as patient reports that he quit smoking 1999.  8.  Peripheral eosinophilia  9.  Therapeutic drug monitoring of Ofev    Plan:  Continue Ofev 150 mg twice daily.  Intermittently checking CMP and CBC for therapeutic drug monitoring  Continue Wixela, Spiriva and albuterol as needed  Continue Singulair, Zyrtec and Flonase  Has repeat chest CT, PFTs and 6-minute walk test ordered for January 2024  Continue CPAP nightly with naps on current settings.  Compliance report reviewed and CPAP use is adequate and controls AHI  Diet and exercise counseling provided today.  Recommended 30 minutes daily exercise well is an 1800 -calorie/day diet.  Patient verbalized understanding.  Total time counseling the patient today was 3 minutes  Vaccination status: patient reports they are up-to-date with flu, pneumonia, and Covid vaccines.  We are out of flu vaccinations in the office today.  Encouraged him to go to his primary care or pharmacist for flu vaccine this fall  Smoking status: Patient is a former cigarette smoker.  Not eligible for lung cancer screening as patient reports that he quit smoking 1999.    Follow Up   Return in about 5 months (around 2/27/2024).  Patient was given instructions and counseling regarding his condition or for health maintenance advice. Please see specific  information pulled into the AVS if appropriate.     Electronically signed by James Jensen MD, 09/27/23, 8:10 AM EDT.

## 2023-11-15 ENCOUNTER — TELEPHONE (OUTPATIENT)
Dept: PULMONOLOGY | Facility: CLINIC | Age: 78
End: 2023-11-15
Payer: OTHER GOVERNMENT

## 2023-11-15 DIAGNOSIS — J44.9 CHRONIC OBSTRUCTIVE PULMONARY DISEASE, UNSPECIFIED COPD TYPE: Primary | ICD-10-CM

## 2023-11-15 RX ORDER — TIOTROPIUM BROMIDE INHALATION SPRAY 3.12 UG/1
2 SPRAY, METERED RESPIRATORY (INHALATION)
Qty: 4 G | Refills: 5 | Status: SHIPPED | OUTPATIENT
Start: 2023-11-15

## 2023-11-15 NOTE — TELEPHONE ENCOUNTER
Provider: AARON    Caller: JINA ARELLANO    Relationship to Patient: SPOUSE    Pharmacy:   [facility] - [place] [address] [address] - [phone] [address] [phone]  [phone]       Phone Number: [phone]     Reason for Call: THE PT NEEDS A NEW PRESCRIPTION SENT IN FOR HIS   tiotropium bromide monohydrate (Spiriva Respimat) 2.5 MCG/ACT aerosol solution inhaler  2 puff, Daily - RT   [facility]. HE WOULD LIKE TO ALSO KNOW IF THERE ARE ANY SAMPLES OF SPARIVA HE CAN COME AND GET FROM THE OFFICE. HE IS RUNNING OUT OF HIS MEDICATION. HIS WIFE WOULD LIKE A CALL AS SOON AS POSSIBLE.     When was the patient last seen: 09/27/23

## 2023-11-15 NOTE — TELEPHONE ENCOUNTER
Spoke to patient, [facility] Sp[facility] to [facility]. Informed patient we did not have samples at this time, would put him on the waitlist and contact him once they arrive.

## 2023-11-16 ENCOUNTER — TELEPHONE (OUTPATIENT)
Dept: PULMONOLOGY | Facility: CLINIC | Age: 78
End: 2023-11-16
Payer: OTHER GOVERNMENT

## 2023-11-16 NOTE — TELEPHONE ENCOUNTER
Called and notified patients wife Hanna on verbal release. Spiriva 2.5 samples are put back for him. Asked that they be picked up within 5 business days

## 2023-11-21 RX ORDER — TIOTROPIUM BROMIDE INHALATION SPRAY 3.12 UG/1
2 SPRAY, METERED RESPIRATORY (INHALATION)
Qty: 2 EACH | Refills: 0 | COMMUNITY
Start: 2023-11-21 | End: 2023-11-22

## 2023-12-15 ENCOUNTER — OFFICE VISIT (OUTPATIENT)
Dept: PODIATRY | Facility: CLINIC | Age: 78
End: 2023-12-15
Payer: MEDICARE

## 2023-12-15 VITALS
TEMPERATURE: 98 F | HEART RATE: 91 BPM | WEIGHT: 249 LBS | DIASTOLIC BLOOD PRESSURE: 86 MMHG | SYSTOLIC BLOOD PRESSURE: 154 MMHG | BODY MASS INDEX: 34.73 KG/M2 | OXYGEN SATURATION: 95 %

## 2023-12-15 DIAGNOSIS — M79.672 FOOT PAIN, BILATERAL: ICD-10-CM

## 2023-12-15 DIAGNOSIS — E11.8 DIABETIC FOOT: Primary | ICD-10-CM

## 2023-12-15 DIAGNOSIS — L60.0 ONYCHOCRYPTOSIS: ICD-10-CM

## 2023-12-15 DIAGNOSIS — E11.9 NON-INSULIN DEPENDENT TYPE 2 DIABETES MELLITUS: ICD-10-CM

## 2023-12-15 DIAGNOSIS — G62.9 NEUROPATHY: ICD-10-CM

## 2023-12-15 DIAGNOSIS — B35.1 ONYCHOMYCOSIS: ICD-10-CM

## 2023-12-15 DIAGNOSIS — M79.671 FOOT PAIN, BILATERAL: ICD-10-CM

## 2023-12-15 NOTE — PROGRESS NOTES
[facility] - PODIATRY    Today's Date: 12/15/23    Patient Name: Abdi Gomez  MRN: [id number]  CSN: [id number]  PCP: Jaimie Stallworth APRN, Last PCP Visit:  7/3/2023  Referring Provider: No ref. provider found    SUBJECTIVE     Chief Complaint   Patient presents with    Left Foot - Follow-up, Nail Problem    Right Foot - Follow-up, Nail Problem     HPI: Abdi Gomez, a 78 y.o.male, presents to clinic for painful toenail and a diabetic foot evaluation.    New, Established, New Problem:  est  Location:  Toenails  Duration:   Greater than five years  Onset:  Gradual  Nature:  sore with palpation.  Stable, worsening, improving:   improving  Aggravating factors:  Pain with shoe gear and ambulation.  Previous Treatment: debridement    Patient controlling diabetes via: Oral medication    Medical changes:  no changes    Patient states their most recent blood glucose readin     Patient denies any fevers, chills, nausea, vomiting, shortness of breath, nor any other constitutional signs nor symptoms.    Relates numbness in his first toes bilaterally.    Past Medical History:   Diagnosis Date    Asthma     COPD (chronic obstructive pulmonary disease)     Coronary artery disease     Coronary artery disease involving native coronary artery of native heart without angina pectoris 2021     Status post angioplasty and stent placement in  (details not available)    Diabetes mellitus     Difficulty walking     Gout     Hyperlipidemia     Hypertension     Pneumonia     Primary central sleep apnea     Sleep apnea     Sleep apnea, obstructive      Past Surgical History:   Procedure Laterality Date    CARDIAC CATHETERIZATION      CORONARY STENT PLACEMENT       Family History   Problem Relation Age of Onset    Heart failure Mother     Cancer Father         Passed with lung cancer     Social History     Socioeconomic History    Marital status:    Tobacco Use    Smoking  status: Former     Packs/day: 1.50     Years: 40.00     Additional pack years: 0.00     Total pack years: 60.00     Types: Cigarettes     Start date: 1963     Quit date: 1999     Years since quittin.9     Passive exposure: Past    Smokeless tobacco: Never   Vaping Use    Vaping Use: Never used   Substance and Sexual Activity    Alcohol use: Never    Drug use: Never    Sexual activity: Not Currently     Partners: Female     Allergies   Allergen Reactions    Atorvastatin Other (See Comments)    Hydrocodone Irritability    Keflex [Cephalexin] Rash     Current Outpatient Medications   Medication Sig Dispense Refill    allopurinol (ZYLOPRIM) 100 MG tablet Take 1 tablet by mouth Daily.      aspirin 81 MG chewable tablet Chew 1 tablet Daily.      cetirizine (zyrTEC) 10 MG tablet Take 1 tablet by mouth Daily.      dilTIAZem CD (CARDIZEM CD) 120 MG 24 hr capsule Take 1 capsule by mouth Daily. 90 capsule 3    ezetimibe (ZETIA) 10 MG tablet Take 1 tablet by mouth Daily.      famotidine (PEPCID) 20 MG tablet Take 1 tablet by mouth 2 (Two) Times a Day.      fluticasone (FLONASE) 50 MCG/ACT nasal spray INSTILL 2 PUFFS INTO EACH NOSTRIL ONCE DAILY      Fluticasone-Salmeterol (WIXELA INHUB IN) Inhale 250 mcg 2 (Two) Times a Day.      glipizide (GLUCOTROL XL) 2.5 MG 24 hr tablet Take 1 tablet by mouth 2 (Two) Times a Day.      hydroCHLOROthiazide (MICROZIDE) 12.5 MG capsule Take 1 capsule by mouth Every Morning.      hydroxychloroquine (PLAQUENIL) 200 MG tablet Take 1 tablet by mouth 2 (Two) Times a Day.      losartan (Cozaar) 100 MG tablet Take 1 tablet by mouth Daily. 90 tablet 3    Misc Natural Products (BLACK CHERRY CONCENTRATE PO) Take  by mouth Daily.      montelukast (SINGULAIR) 10 MG tablet Take 1 tablet by mouth Every Night.      Nintedanib Esylate (Ofev) 150 MG capsule Take 150 mg by mouth 2 (Two) Times a Day. 60 capsule 5    Omega-3 Fatty Acids (fish oil) 1200 MG capsule capsule Take 1 capsule by mouth Daily.  "     Red Yeast Rice Extract 600 MG capsule Take  by mouth Daily.      tiotropium bromide monohydrate (Spiriva Respimat) 2.5 MCG/ACT aerosol solution inhaler Inhale 2 puffs Daily. 4 g 5    Ventolin  (90 Base) MCG/ACT inhaler Inhale 2 puffs Every 6 (Six) Hours As Needed.      vitamin B-12 (CYANOCOBALAMIN) 1000 MCG tablet Take 1 tablet by mouth Daily.      VITAMIN D, CHOLECALCIFEROL, PO Take  by mouth Daily.      clindamycin (CLEOCIN) 150 MG capsule  (Patient not taking: Reported on 7/6/2023)      ipratropium-albuterol (DUO-NEB) 0.5-2.5 mg/3 ml nebulizer Take 3 mL by nebulization 4 (Four) Times a Day As Needed for Wheezing for up to 90 days. 360 mL 3    tiotropium bromide monohydrate (Spiriva Respimat) 2.5 MCG/ACT aerosol solution inhaler Inhale 2 puffs Daily for 1 day. 2 each 0     No current facility-administered medications for this visit.     Review of Systems   Constitutional: Negative.    Skin:         Painful toenails   Neurological:  Positive for numbness.   All other systems reviewed and are negative.      OBJECTIVE     Vitals:    12/15/23 1030   BP: 154/86   Pulse: 91   Temp: 98 °F (36.7 °C)   SpO2: 95%       Body mass index is 34.73 kg/m².    No results found for: \"HGBA1C\"    Lab Results   Component Value Date    GLUCOSE 67 09/01/2023    CALCIUM 10.4 09/01/2023     09/01/2023    K 4.4 09/01/2023    CO2 26.0 09/01/2023     09/01/2023    BUN 16 09/01/2023    CREATININE 1.36 (H) 09/01/2023    EGFRIFNONA 60 (L) 09/07/2021    BCR 11.8 09/01/2023    ANIONGAP 11.0 09/01/2023       Patient seen in no apparent distress.      PHYSICAL EXAM:     Foot/Ankle Exam    GENERAL  Diabetic foot exam performed    Appearance:  obese and elderly  Orientation:  AAOx3  Affect:  appropriate  Gait:  unimpaired  Assistance:  independent  Right shoe gear: casual shoe  Left shoe gear: casual shoe    VASCULAR     Right Foot Vascularity   Dorsalis pedis:  2+  Posterior tibial:  2+  Skin temperature:  warm  Edema " gradin+ and non-pitting  CFT:  < 3 seconds  Pedal hair growth:  Absent  Varicosities:  mild varicosities     Left Foot Vascularity   Dorsalis pedis:  2+  Posterior tibial:  2+  Skin temperature:  warm  Edema gradin+ and non-pitting  CFT:  < 3 seconds  Pedal hair growth:  Absent  Varicosities:  mild varicosities     NEUROLOGIC     Right Foot Neurologic   Light touch sensation: diminished  Vibratory sensation: diminished  Hot/Cold sensation: diminished  Protective Sensation using [place] Monofilament:   Sites intact: 2  Sites tested: 10     Left Foot Neurologic   Light touch sensation: diminished  Vibratory sensation: diminished  Hot/Cold sensation:  diminished  Protective Sensation using [place]un Monofilament:   Sites intact: 2  Sites tested: 10    MUSCLE STRENGTH     Right Foot Muscle Strength   Foot dorsiflexion:  4  Foot plantar flexion:  4  Foot inversion:  4  Foot eversion:  4     Left Foot Muscle Strength   Foot dorsiflexion:  4  Foot plantar flexion:  4  Foot inversion:  4  Foot eversion:  4    RANGE OF MOTION     Right Foot Range of Motion   Foot and ankle ROM within normal limits       Left Foot Range of Motion   Foot and ankle ROM within normal limits      DERMATOLOGIC      Right Foot Dermatologic   Skin  Right foot skin is intact.   Nails  1.  Positive for elongated, onychomycosis, abnormal thickness, subungual debris and ingrown toenail.  2.  Positive for elongated, onychomycosis, abnormal thickness, subungual debris and ingrown toenail.     Left Foot Dermatologic   Skin  Left foot skin is intact.   Nails  1.  Positive for elongated, onychomycosis, abnormal thickness, subungual debris and ingrown toenail.  2.  Positive for elongated, onychomycosis, abnormal thickness, subungual debris and ingrown toenail.  3.  Positive for elongated, onychomycosis, abnormal thickness, subungual debris and ingrown toenail.  4.  Positive for elongated, onychomycosis, abnormally thick, subungual  debris and ingrown toenail.    Diabetic Foot Exam Performed and Monofilament Test Performed      ASSESSMENT/PLAN     Diagnoses and all orders for this visit:    1. Diabetic foot (Primary)    2. Neuropathy    3. Foot pain, bilateral    4. Onychocryptosis    5. Non-insulin dependent type 2 diabetes mellitus    6. Onychomycosis    Comprehensive lower extremity examination and evaluation was performed.    Discussed findings and treatment plan including risks, benefits, and treatment options with patient in detail. Patient agreed with treatment plan.    Medications and allergies reviewed.  Reviewed available blood glucose and HgB A1C lab values along with other pertinent labs.  These were discussed with the patient as to their importance of diabetic maintenance.    Toenails 1, 2 on Right and 1, 2, 3, 4 on Left were debrided with nail nippers then filed with a [employer] nail doris.  Patient tolerated procedure well without complications.    Diabetic foot exam performed and documented this date, compliant with CQM required standards. Detail of findings as noted in physical exam.  Lower extremity Neurologic exam for diabetic patient performed and documented this date, compliant with PQRS required standards. Detail of findings as noted in physical exam.  Advised patient importance of good routine lower extremity hygiene. Advised patient importance of evaluating for intact skin and pain free nail borders.  Advised patient to use mirror to evaluate plantar/ soles of feet for better visualization. Advised patient monitor and phone office to be seen if any cracking to skin, open lesions, painful nail borders or if nails become elongated prior to next visit. Advised patient importance of daily cleansing of lower extremities, followed by good skin cream to maintain normal hydration of skin. Also advised patient importance of close daily monitoring of blood sugar. Advised to regulate diet and medications to maintain control of blood  sugar in optimal range. Contact primary care provider if difficulties maintaining blood sugar levels.  Advised Patient of presence of Diabetes Mellitus condition.  Advised Patient risk of progression and worsening or improvement, then return of condition.  Will monitor condition for any change in future. Treat with most appropriate treatment pending status of condition.  Counseled and advised patient extensively on nature and ramifications of diabetes. Standard instructions given to patient for good diabetic foot care and maintenance. Advised importance of careful monitoring to avoid break down and complications secondary to diabetes. Advised patient importance of strict maintenance of blood sugar control. Advised patient of possible ominous results from neglect of condition, i.e.: amputation/ loss of digits, feet and legs, or even death.  Patient states understands counseling, will monitor closely, continue good hygiene and routine diabetic foot care. Patient will contact office should they have any questions or problems.      An After Visit Summary was printed and given to the patient at discharge, including (if requested) any available informative/educational handouts regarding diagnosis, treatment, or medications. All questions were answered to patient/family satisfaction. Should symptoms fail to improve or worsen they agree to call or return to clinic or to go to the Emergency Department. Discussed the importance of following up with any needed screening tests/labs/specialist appointments and any requested follow-up recommended by me today. Importance of maintaining follow-up discussed and patient accepts that missed appointments can delay diagnosis and potentially lead to worsening of conditions.    Return in about 9 weeks (around 2/16/2024) for Toenail Care., or sooner if acute issues arise.    This document has been electronically signed by Richie Watson DPM on December 15, 2023 10:43 EST

## 2024-01-02 DIAGNOSIS — J84.10 PULMONARY FIBROSIS: ICD-10-CM

## 2024-01-02 RX ORDER — NINTEDANIB 150 MG/1
150 CAPSULE ORAL 2 TIMES DAILY
Qty: 60 CAPSULE | Refills: 5 | Status: SHIPPED | OUTPATIENT
Start: 2024-01-02 | End: 2024-01-03 | Stop reason: SDUPTHER

## 2024-01-02 NOTE — TELEPHONE ENCOUNTER
Caller: ALANA ARELLANO    Relationship to patient: Emergency Contact    Patient is needing: PHARMACY NEEDS RX FAXED OVER INSTEAD     FAX NUMBER [phone]

## 2024-01-02 NOTE — TELEPHONE ENCOUNTER
Caller: ALANA ARELLANO    Relationship: SPOUSE    Best call back number: [phone]    Requested Prescriptions:   Requested Prescriptions     Pending Prescriptions Disp Refills    Nintedanib Esylate (Ofev) 150 MG capsule 60 capsule 5     Sig: Take 150 mg by mouth 2 (Two) Times a Day.        Pharmacy where request should be sent: [facility] - [place], [place] - [address] - [phone] PH - [phone] FX     Last office visit with prescribing clinician: 7/13/2023   Last telemedicine visit with prescribing clinician: Visit date not found   Next office visit with prescribing clinician: 2/6/2024     Additional details provided by patient:     Does the patient have less than a 3 day supply:  [] Yes  [] No    Would you like a call back once the refill request has been completed: [] Yes [] No    If the office needs to give you a call back, can they leave a voicemail: [] Yes [] No    Josiah Garcia Rep   01/02/24 12:17 EST

## 2024-01-03 ENCOUNTER — TELEPHONE (OUTPATIENT)
Dept: PULMONOLOGY | Facility: CLINIC | Age: 79
End: 2024-01-03
Payer: OTHER GOVERNMENT

## 2024-01-03 DIAGNOSIS — J84.10 PULMONARY FIBROSIS: ICD-10-CM

## 2024-01-03 RX ORDER — NINTEDANIB 150 MG/1
150 CAPSULE ORAL 2 TIMES DAILY
Qty: 60 CAPSULE | Refills: 5 | Status: SHIPPED | OUTPATIENT
Start: 2024-01-03

## 2024-01-03 RX ORDER — NINTEDANIB 150 MG/1
150 CAPSULE ORAL 2 TIMES DAILY
Qty: 60 CAPSULE | Refills: 5 | Status: SHIPPED | OUTPATIENT
Start: 2024-01-03 | End: 2024-01-03 | Stop reason: SDUPTHER

## 2024-01-03 NOTE — TELEPHONE ENCOUNTER
Pt called stated he needed the OFEV prescription sent to the [facility] pharmacy and a call when it has been sent please.

## 2024-01-03 NOTE — TELEPHONE ENCOUNTER
Caller: ALANA ARELLANO    Relationship to patient: Emergency Contact    Best call back number: [phone]    Patient is needing: PHARMACY NEEDS RX FAXED OVER AND THEY HAVE NOT RECEIVED ANYTHING         [facility] - [place], [place] - [facility] [address] - [phone]  - [phone] FX

## 2024-01-09 RX ORDER — CELECOXIB 100 MG/1
1 CAPSULE ORAL EVERY 12 HOURS SCHEDULED
COMMUNITY
Start: 2023-12-14

## 2024-01-10 ENCOUNTER — OFFICE VISIT (OUTPATIENT)
Dept: OTOLARYNGOLOGY | Facility: CLINIC | Age: 79
End: 2024-01-10
Payer: MEDICARE

## 2024-01-10 VITALS — TEMPERATURE: 97.5 F | HEIGHT: 71 IN | WEIGHT: 256.2 LBS | BODY MASS INDEX: 35.87 KG/M2

## 2024-01-10 DIAGNOSIS — D17.0 LIPOMA OF NECK: ICD-10-CM

## 2024-01-10 DIAGNOSIS — J30.9 ALLERGIC RHINITIS, UNSPECIFIED SEASONALITY, UNSPECIFIED TRIGGER: ICD-10-CM

## 2024-01-10 DIAGNOSIS — H90.A21 SENSORINEURAL HEARING LOSS (SNHL) OF RIGHT EAR WITH RESTRICTED HEARING OF LEFT EAR: ICD-10-CM

## 2024-01-10 DIAGNOSIS — H61.23 HEARING LOSS DUE TO CERUMEN IMPACTION, BILATERAL: ICD-10-CM

## 2024-01-10 DIAGNOSIS — Z87.891 HISTORY OF TOBACCO ABUSE: ICD-10-CM

## 2024-01-10 DIAGNOSIS — G47.33 OSA (OBSTRUCTIVE SLEEP APNEA): ICD-10-CM

## 2024-01-10 DIAGNOSIS — H90.A32 MIXED CONDUCTIVE AND SENSORINEURAL HEARING LOSS OF LEFT EAR WITH RESTRICTED HEARING OF RIGHT EAR: ICD-10-CM

## 2024-01-10 DIAGNOSIS — J33.8 NASAL SINUS POLYP: Primary | ICD-10-CM

## 2024-01-10 DIAGNOSIS — J32.9 CHRONIC SINUSITIS, UNSPECIFIED LOCATION: ICD-10-CM

## 2024-01-10 PROCEDURE — 1160F RVW MEDS BY RX/DR IN RCRD: CPT | Performed by: OTOLARYNGOLOGY

## 2024-01-10 PROCEDURE — 99214 OFFICE O/P EST MOD 30 MIN: CPT | Performed by: OTOLARYNGOLOGY

## 2024-01-10 PROCEDURE — 1159F MED LIST DOCD IN RCRD: CPT | Performed by: OTOLARYNGOLOGY

## 2024-01-10 PROCEDURE — 69210 REMOVE IMPACTED EAR WAX UNI: CPT | Performed by: OTOLARYNGOLOGY

## 2024-01-10 PROCEDURE — 31231 NASAL ENDOSCOPY DX: CPT | Performed by: OTOLARYNGOLOGY

## 2024-01-10 NOTE — PATIENT INSTRUCTIONS
1.  Today's rigid nasal endoscopy examination shows no evidence of any sinus recurrent polyposis.  2.  Patient obstructive sleep apnea stable with CPAP.  3.  Patient has underlying sensorineural hearing loss but seems to be unremarkable at this point.  Will obtain audiogram at later time.  4.  There is no evidence of recurrent lipoma on examination of the neck.  5.  Chronic sinusitis is stable at this time  6.  Despite history of tobacco abuse patient is not using any tobacco products at this time.  7.  I will see patient in 6 months for another rigid nasal endoscopy  8.  Patient is continuing on Singulair, Zyrtec, and Flonase.  Patient has restarted allergy immunotherapy with family allergy and asthma.

## 2024-01-10 NOTE — PROGRESS NOTES
Patient Name: Abdi Gomez   Visit Date: 01/10/2024   Patient ID: [id number]  Provider: Joaquin Hopper MD    Sex: male  Location: [facility]   YOB: 1945  Location Address: [address] [place] [address], [address] [place] [address],?[place]?[id number]    Primary Care Provider Federica JaimieAPARNA Cabrera  Location Phone: [phone]    Referring Provider: No ref. provider found        Chief Complaint  NASAL ENDOSCOPY FOLLOW UP    History of Present Illness  Abdi Gomez is a 78 y.o. male who presents to [facility] for NASAL ENDOSCOPY FOLLOW UP.  Patient has history of chronic eustachian tube dysfunction which she underwent bilateral PE tube placements in July 23, 2019..  Patient also has obstructive sleep apnea which she is on CPAP.  Patient has allergic rhinitis which she had been retested and had been on immunotherapy in the past.  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  He had sinus polyps that were removed and last rigid nasal endoscopy on May 25, 2023 revealed no evidence of any recurrent disease.  He also has a large neck lipoma which was excised with no evidence of any recurrent disease at this time.  Patient is here for follow-up on all of those with no complaints.    Past Medical History:   Diagnosis Date    Asthma     COPD (chronic obstructive pulmonary disease)     Coronary artery disease     Coronary artery disease involving native coronary artery of native heart without angina pectoris 11/29/2021     Status post angioplasty and stent placement in 1999 (details not available)    Diabetes mellitus     Difficulty walking 2022    Gout 2022    Hyperlipidemia     Hypertension     Pneumonia 2019    Primary central sleep apnea     Sleep apnea     Sleep apnea, obstructive        Past Surgical History:   Procedure Laterality Date    CARDIAC CATHETERIZATION       CORONARY STENT PLACEMENT           Current Outpatient Medications:     allopurinol (ZYLOPRIM) 100 MG tablet, Take 1 tablet by mouth Daily., Disp: , Rfl:     aspirin 81 MG chewable tablet, Chew 1 tablet Daily., Disp: , Rfl:     celecoxib (CeleBREX) 100 MG capsule, Take 1 capsule by mouth Every 12 (Twelve) Hours., Disp: , Rfl:     cetirizine (zyrTEC) 10 MG tablet, Take 1 tablet by mouth Daily., Disp: , Rfl:     dilTIAZem CD (CARDIZEM CD) 120 MG 24 hr capsule, Take 1 capsule by mouth Daily., Disp: 90 capsule, Rfl: 3    ezetimibe (ZETIA) 10 MG tablet, Take 1 tablet by mouth Daily., Disp: , Rfl:     famotidine (PEPCID) 20 MG tablet, Take 1 tablet by mouth 2 (Two) Times a Day., Disp: , Rfl:     fluticasone (FLONASE) 50 MCG/ACT nasal spray, INSTILL 2 PUFFS INTO EACH NOSTRIL ONCE DAILY, Disp: , Rfl:     Fluticasone-Salmeterol (WIXELA INHUB IN), Inhale 250 mcg 2 (Two) Times a Day., Disp: , Rfl:     glipizide (GLUCOTROL XL) 2.5 MG 24 hr tablet, Take 1 tablet by mouth 2 (Two) Times a Day., Disp: , Rfl:     hydroCHLOROthiazide (MICROZIDE) 12.5 MG capsule, Take 1 capsule by mouth Every Morning., Disp: , Rfl:     hydroxychloroquine (PLAQUENIL) 200 MG tablet, Take 1 tablet by mouth 2 (Two) Times a Day., Disp: , Rfl:     losartan (Cozaar) 100 MG tablet, Take 1 tablet by mouth Daily., Disp: 90 tablet, Rfl: 3    Misc Natural Products (BLACK CHERRY CONCENTRATE PO), Take  by mouth Daily., Disp: , Rfl:     montelukast (SINGULAIR) 10 MG tablet, Take 1 tablet by mouth Every Night., Disp: , Rfl:     Nintedanib Esylate (Ofev) 150 MG capsule, Take 150 mg by mouth 2 (Two) Times a Day., Disp: 60 capsule, Rfl: 5    Omega-3 Fatty Acids (fish oil) 1200 MG capsule capsule, Take 1 capsule by mouth Daily., Disp: , Rfl:     Red Yeast Rice Extract 600 MG capsule, Take  by mouth Daily., Disp: , Rfl:     tiotropium bromide monohydrate (Spiriva Respimat) 2.5 MCG/ACT aerosol solution inhaler, Inhale 2 puffs Daily., Disp: 4 g, Rfl: 5    Ventolin   "(90 Base) MCG/ACT inhaler, Inhale 2 puffs Every 6 (Six) Hours As Needed., Disp: , Rfl:     vitamin B-12 (CYANOCOBALAMIN) 1000 MCG tablet, Take 1 tablet by mouth Daily., Disp: , Rfl:     VITAMIN D, CHOLECALCIFEROL, PO, Take  by mouth Daily., Disp: , Rfl:     clindamycin (CLEOCIN) 150 MG capsule, , Disp: , Rfl:     ipratropium-albuterol (DUO-NEB) 0.5-2.5 mg/3 ml nebulizer, Take 3 mL by nebulization 4 (Four) Times a Day As Needed for Wheezing for up to 90 days., Disp: 360 mL, Rfl: 3    tiotropium bromide monohydrate (Spiriva Respimat) 2.5 MCG/ACT aerosol solution inhaler, Inhale 2 puffs Daily for 1 day., Disp: 2 each, Rfl: 0     Allergies   Allergen Reactions    Atorvastatin Other (See Comments)    Hydrocodone Irritability    Keflex [Cephalexin] Rash       Social History     Tobacco Use    Smoking status: Former     Packs/day: 1.50     Years: 40.00     Additional pack years: 0.00     Total pack years: 60.00     Types: Cigarettes     Start date: 1963     Quit date: 1999     Years since quittin.0     Passive exposure: Past    Smokeless tobacco: Never   Vaping Use    Vaping Use: Never used   Substance Use Topics    Alcohol use: Never    Drug use: Never        Objective     Vital Signs:   Vitals:    01/10/24 1450   Temp: 97.5 °F (36.4 °C)   TempSrc: Temporal   Weight: 116 kg (256 lb 3.2 oz)   Height: 180.3 cm (71\")       Tobacco Use: Medium Risk (1/10/2024)    Patient History     Smoking Tobacco Use: Former     Smokeless Tobacco Use: Never     Passive Exposure: Past         Physical Exam    Constitutional   Appearance  well developed, well-nourished, alert and in no acute distress, voice clear and strong    Head   Inspection  no deformities or lesions      Face   Inspection  no facial lesions; House-Brackmann I/VI bilaterally   Palpation  no TMJ crepitus nor  muscle tenderness bilaterally     Eyes/Vision   Visual Fields  extraocular movements are intact, no spontaneous or gaze-induced " nystagmus  Conjunctivae  clear   Sclerae  clear   Pupils and Irises  pupils equal, round, and reactive to light.   Nystagmus  not present     Ears, Nose, Mouth and Throat  Ears  External Ears  appearance within normal limits, no lesions present   Otoscopic Examination  tympanic membrane scarred bilaterally but within normal limits.  There is no evidence of any perforation or significant retraction.  Hearing  intact to conversational voice both ears   Tunning fork testing    Rinne:  Storey:    Nose  External Nose  appearance normal   Intranasal Exam  mucosa within normal limits, vestibules normal, no intranasal lesions present, septum midline, sinuses non tender to percussion   Modified Delta Test:    Oral Cavity  Oral Mucosa  oral mucosa normal without pallor or cyanosis   Lips  lip appearance normal   Teeth  normal dentition for age   Gums  gums pink, non-swollen, no bleeding present   Tongue  tongue appearance normal; normal mobility   Palate  hard palate normal, soft palate appearance normal with symmetric mobility     Throat  Oropharynx  no inflammation or lesions present, tonsils within normal limits   Hypopharynx  appearance within normal limits   Larynx  voice normal     Neck  Inspection/Palpation  normal appearance, no masses or tenderness, trachea midline; thyroid size normal, nontender, no nodules or masses present on palpation     Lymphatic  Neck  no lymphadenopathy present   Supraclavicular Nodes  no lymphadenopathy present   Preauricular Nodes  no lymphadenopathy present     Respiratory  Respiratory Effort  breathing unlabored   Inspection of Chest  normal appearance, no retractions     Musculoskeletal   Cervical back: Normal range of motion and neck supple.      Skin and Subcutaneous Tissue  General Inspection  Regarding face and neck - there are no rashes present, no lesions present, and no areas of discoloration     Neurologic  Cranial Nerves  cranial nerves II-XII are grossly intact bilaterally    Gait and Station  normal gait, able to stand without diffculty    Psychiatric  Judgement and Insight  judgment and insight intact   Mood and Affect  mood normal, affect appropriate       RESULTS REVIEWED    I have reviewed the following information:   []  Previous Internal Note  [x]  Previous External Note:   [x]  Ordered Tests & Results:      Pathology:      PTH, Intact   Date Value Ref Range Status   09/01/2023 37.1 15.0 - 65.0 pg/mL Final     Calcium   Date Value Ref Range Status   09/01/2023 10.4 8.6 - 10.5 mg/dL Final       No Images in the past 120 days found..    I have discussed the interpretation of the above results with the patient.    Ear Cerumen Removal    Date/Time: 1/10/2024 3:24 PM    Performed by: Joaquin Hopper MD  Authorized by: Joaquin Hopper MD    Anesthesia:  Local Anesthetic: none  Location details: left ear and right ear  Comments: Bilateral cerumen impaction cleaned with alligator forceps.  Bilateral tympanic membranes are scarred otherwise unremarkable.  Procedure type: instrumentation, alligator forceps     Nasal endoscopy    Date/Time: 1/10/2024 3:25 PM    Performed by: Joaquin Hopper MD  Authorized by: Joaquin Hopper MD    Consent:     Consent obtained:  Verbal    Consent given by:  Patient    Risks discussed:  Bleeding and pain    Alternatives discussed:  No treatment and delayed treatment  Procedure details:     Medication:  Afrin and Lit-Synephrine 1%    Instrument: rigid nasal endoscopy      Scope location: bilateral nare    Septum:     Findings: normal    Post-procedure details:     Patient tolerance of procedure:  Tolerated well  Comments:      Rigid nasal endoscopy was performed in each nostril.  Both ethmoid cavities are widely patent without recurrence of polyp.  Frontal recess areas are totally unremarkable.  Maxillary sinus antrostomies are also unremarkable with no evidence of any disease or drainage.  Sphenoid ostium on all open and unremarkable as well.  Septum is  nicely in midline with excellent nasal airway present.            Assessment and Plan   Diagnoses and all orders for this visit:    1. Nasal sinus polyp (Primary)  -     $ Nasal / Sinus Endoscopy    2. FILOMENA (obstructive sleep apnea)    3. Sensorineural hearing loss (SNHL) of right ear with restricted hearing of left ear    4. Mixed conductive and sensorineural hearing loss of left ear with restricted hearing of right ear    5. Lipoma of neck    6. Chronic sinusitis, unspecified location  -     $ Nasal / Sinus Endoscopy    7. Allergic rhinitis, unspecified seasonality, unspecified trigger    8. History of tobacco abuse    9. Hearing loss due to cerumen impaction, bilateral  -     Ear Cerumen Removal        Abdi Gomez  reports that he quit smoking about 25 years ago. His smoking use included cigarettes. He started smoking about 61 years ago. He has a 60.00 pack-year smoking history. He has been exposed to tobacco smoke. He has never used smokeless tobacco. I have educated him on the risk of diseases from using tobacco products such as Cancer, COPD & Heart Disease.       Plan:  Patient Instructions   1.  Today's rigid nasal endoscopy examination shows no evidence of any sinus recurrent polyposis.  2.  Patient obstructive sleep apnea stable with CPAP.  3.  Patient has underlying sensorineural hearing loss but seems to be unremarkable at this point.  Will obtain audiogram at later time.  4.  There is no evidence of recurrent lipoma on examination of the neck.  5.  Chronic sinusitis is stable at this time  6.  Despite history of tobacco abuse patient is not using any tobacco products at this time.  7.  I will see patient in 6 months for another rigid nasal endoscopy  8.  Patient is continuing on Singulair, Zyrtec, and Flonase.  Patient has restarted allergy immunotherapy with family allergy and asthma.     32 minutes were spent caring for Abdi on this date of service. This time spent by me includes preparing for  the visit, reviewing tests, obtaining/reviewing separately obtained history, performing medically appropriate exam/evaluation, counseling/educating the patient/family/caregiver, ordering medications/tests/procedures, referring/communicating with other health care professionals, documenting information in the medical record, independently interpreting results and communicating that with the patient/family/caregiver and/or care coordination.       Follow Up   Return in about 6 months (around 7/10/2024), or Follow-up with rigid nasal Endo.  Patient was given instructions and counseling regarding his condition or for health maintenance advice. Please see specific information pulled into the AVS if appropriate.

## 2024-01-18 ENCOUNTER — HOSPITAL ENCOUNTER (OUTPATIENT)
Dept: RESPIRATORY THERAPY | Facility: HOSPITAL | Age: 79
Discharge: HOME | End: 2024-01-18
Payer: MEDICARE

## 2024-01-18 DIAGNOSIS — F17.201 TOBACCO ABUSE, IN REMISSION: ICD-10-CM

## 2024-01-18 DIAGNOSIS — R06.00 DYSPNEA, UNSPECIFIED TYPE: ICD-10-CM

## 2024-01-18 DIAGNOSIS — J44.9 CHRONIC OBSTRUCTIVE PULMONARY DISEASE, UNSPECIFIED COPD TYPE: ICD-10-CM

## 2024-01-18 DIAGNOSIS — G47.33 OSA (OBSTRUCTIVE SLEEP APNEA): ICD-10-CM

## 2024-01-18 DIAGNOSIS — Z86.16 HISTORY OF COVID-19: ICD-10-CM

## 2024-01-18 PROCEDURE — 94618 PULMONARY STRESS TESTING: CPT

## 2024-01-18 PROCEDURE — 94729 DIFFUSING CAPACITY: CPT

## 2024-01-18 PROCEDURE — 94060 EVALUATION OF WHEEZING: CPT

## 2024-01-18 PROCEDURE — 94726 PLETHYSMOGRAPHY LUNG VOLUMES: CPT

## 2024-01-18 RX ORDER — ALBUTEROL SULFATE 2.5 MG/3ML
2.5 SOLUTION RESPIRATORY (INHALATION) ONCE
Status: COMPLETED | OUTPATIENT
Start: 2024-01-18 | End: 2024-01-18

## 2024-01-18 RX ADMIN — ALBUTEROL SULFATE 2.5 MG: 2.5 SOLUTION RESPIRATORY (INHALATION) at 14:30

## 2024-01-18 NOTE — PROCEDURES
Exercise Oximetry    Patient Name:Abdi Gomez   MRN: [id number]   Date: 01/18/24             ROOM AIR BASELINE   SpO2% 94   Heart Rate 92   Blood Pressure 146/80     EXERCISE ON ROOM AIR SpO2% EXERCISE ON O2 @  LPM SpO2%   1 MINUTE 96 1 MINUTE    2 MINUTES 95 2 MINUTES    3 MINUTES 95 3 MINUTES    4 MINUTES 93 4 MINUTES    5 MINUTES 93 5 MINUTES    6 MINUTES 93 6 MINUTES               Distance Walked  600feet Distance Walked   Dyspnea (Yady Scale)  5 Dyspnea (Yady Scale)   Fatigue (Yady Scale)  0 Fatigue (Yady Scale)   SpO2% Post Exercise  95 SpO2% Post Exercise   HR Post Exercise  102 HR Post Exercise   B/P Post Exercise   180/80 Time to Recovery     Comments: Patient walked independently at a steady pace.  Patient stated that he was short of breath during and after walk.  Patient sat down and rested approximately 5 minutes, then stated he felt better.

## 2024-01-29 ENCOUNTER — HOSPITAL ENCOUNTER (OUTPATIENT)
Dept: CT IMAGING | Facility: HOSPITAL | Age: 79
Discharge: HOME | End: 2024-01-29
Admitting: NURSE PRACTITIONER
Payer: MEDICARE

## 2024-01-29 DIAGNOSIS — J84.10 PULMONARY FIBROSIS: ICD-10-CM

## 2024-01-29 PROCEDURE — 71250 CT THORAX DX C-: CPT

## 2024-01-30 ENCOUNTER — TELEPHONE (OUTPATIENT)
Dept: PULMONOLOGY | Facility: CLINIC | Age: 79
End: 2024-01-30
Payer: MEDICARE

## 2024-01-30 NOTE — PATIENT INSTRUCTIONS
Pulmonary Fibrosis    Pulmonary fibrosis is a type of lung disease that causes scarring. Over time, the scar tissue builds up in the air sacs of your lungs (alveoli). This makes it hard for you to breathe because less oxygen gets into your bloodstream.  Scarring from pulmonary fibrosis is permanent and may lead to other serious health problems.  What are the causes?  There are many different causes of pulmonary fibrosis. In some cases, the cause is not known. This is called idiopathic pulmonary fibrosis. Other causes include:  Exposure to chemicals and substances found in agricultural, farm, construction, or factory work. These include mold, asbestos, silica, metal dusts, and toxic fumes.  Sarcoidosis. In this disease, areas of inflammatory cells (granulomas) form and most often affect the lungs.  Autoimmune diseases. These include diseases such as rheumatoid arthritis, systemic sclerosis, or connective tissue disease.  Taking certain medicines. These include drugs used in radiation therapy or used to treat seizures, heart problems, and some infections.  What increases the risk?  You are more likely to develop this condition if:  You have a family history of the disease.  You are an older person. The condition is more common in older adults.  You have a history of smoking.  You have a job that exposes you to certain chemicals.  You have gastroesophageal reflux disease (GERD).  What are the signs or symptoms?  Symptoms of this condition include:  Difficulty breathing that gets worse with activity.  Shortness of breath (dyspnea).  Dry, hacking cough.  Rapid, shallow breathing during exercise or while at rest.  Other symptoms may include:  Loss of appetite or weight loss  Tiredness (fatigue) or weakness.  Bluish skin and lips.  Rounded and enlarged fingertips (clubbing).  How is this diagnosed?  This condition may be diagnosed based on:  Your symptoms and medical history.  A physical exam.  You may also have tests,  including:  A test that involves looking inside your lungs with an instrument (bronchoscopy).  Imaging studies of your lungs and heart.  Tests to measure how well you are breathing (pulmonary function tests).  Blood tests.  Tests to see how well your lungs work while you are walking (pulmonary stress test).  A procedure to remove a lung tissue sample to look at it under a microscope (biopsy).  How is this treated?  There is no cure for pulmonary fibrosis. Treatment focuses on managing symptoms and preventing scarring from getting worse. This may include:  Medicines, such as:  Steroids to prevent permanent lung changes.  Medicines to suppress your body's defense system (immune system).  Medicines to help with lung function by reducing inflammation or scarring.  Ongoing monitoring with X-rays and lab work.  Oxygen therapy.  Pulmonary rehabilitation.  Surgery. In some cases, a lung transplant is possible.  Follow these instructions at home:    Medicines  Take over-the-counter and prescription medicines only as told by your health care provider.  Keep your vaccinations up to date as recommended by your health care provider.  Activity  Get regular exercise, but do not pick activities that are too strenuous for you. Ask your health care provider what activities are safe for you.  If you have physical limitations, you may get exercise by walking, using a stationary bike, or doing chair exercises.  Ask your health care provider about using oxygen while exercising.  Do breathing exercises as told by your health care provider.  Plan rest periods when you get tired.  General instructions  Do not use any products that contain nicotine or tobacco. These products include cigarettes, chewing tobacco, and vaping devices, such as e-cigarettes. If you need help quitting, ask your health care provider.  If you are exposed to chemicals and substances at work, make sure that you wear a mask or respirator at all times.  Learn to manage  stress. If you need help to do this, ask your health care provider.  Join a pulmonary rehabilitation program or a support group for people with pulmonary fibrosis.  Eat small meals often so you do not get too full. Overeating can make breathing trouble worse.  Maintain a healthy weight. Lose weight if you need to.  Keep all follow-up visits. This is important.  Where to find more information  American Lung Association: www.lung.org  National Heart, Lung, and Blood [place]: www.nhlbi.nih.gov  Pulmonary Fibrosis Foundation: pulmonaryfibrosis.org  Contact a health care provider if:  You have symptoms that do not get better with medicines.  You are not able to be as active as usual.  You have trouble taking a deep breath.  You have a fever or chills.  You have blue lips or skin.  You have a lot of headaches.  You cough up mucus that is dark in color.  You have feelings of depression or sadness.  You are unable to sleep because it is hard to breathe.  Get help right away if:  Your symptoms suddenly worsen.  You have chest pain.  You cough up blood.  You get very confused or sleepy.  These symptoms may be an emergency. Get help right away. Call 911.  Do not wait to see if the symptoms will go away.  Do not drive yourself to the hospital.  Summary  Pulmonary fibrosis is a type of lung disease that causes scar tissue to build up in the air sacs of your lungs (alveoli) over time. This makes it hard for you to breathe because less oxygen gets into your bloodstream.  Scarring from pulmonary fibrosis is permanent and may lead to other serious health problems.  You are more likely to develop this condition if you have a family history of the condition or a job that exposes you to certain chemicals.  There is no cure for pulmonary fibrosis. Treatment focuses on managing symptoms and preventing scarring from getting worse.  This information is not intended to replace advice given to you by your health care provider. Make sure  you discuss any questions you have with your health care provider.  Document Revised: 08/09/2022 Document Reviewed: 08/09/2022  Elsevier Patient Education © 2023 [employer]

## 2024-01-30 NOTE — PROGRESS NOTES
Primary Care Provider  Jaimie Stallworth APRN     Referring Provider  No ref. provider found     Chief Complaint  Shortness of Breath, Follow-up, COPD, and Pulmonary fibrosis    Subjective          History of Presenting Illness  Patient is a 78-year-old male, patient of Dr. Saenz who presents for management of combined pulmonary fibrosis with emphysema who presents for follow-up visit today.  Patient states that since last visit his breathing has improved.  Patient states that he is able to walk further.  Patient states that he has only been taking oxygen once daily recently due to experiencing diarrhea with twice daily dosing.  Patient states that he does get short of breath that is worse with exertion mild to moderate severity, improved with rest.  Patient states that he wears supplemental Wixela and Spiriva every day as prescribed use albuterol inhaler and DuoNeb nebulizer treatments as needed.  Patient is taking Singulair, Zyrtec, and Flonase nasal spray for seasonal allergies.  Patient states that he is using his CPAP machine but when he sleeps and receives his supplies through [employer].  Patient denies any morning headaches or excessive daytime sleepiness.  Since last office visit patient had a pulmonary function test and a 6-minute walk test completed on 1/18/2024.  Pulmonary function test report states mild restrictive lung defect present.  No bronchodilator response was noted.  Diffusion capacity moderately reduced.  Compared to testing done in March 2022, the FEV1 and FVC have both significantly improved. Patient's six-minute walk test showed desaturations down to 93% on room air.  Patient had a chest CT scan completed on 1/29/2024.  Report states grossly similar moderate emphysema with superimposed lower lobe subpleural reticulation that could reflect mild fibrotic change. No active pulmonary process. Stable small pulmonary nodules. Coronary and aortic atherosclerotic disease.  Patient states  that he is under the care of cardiologist, Dr. Nguyen for history of coronary artery disease.  Patient denies fever, chills, night sweats, swollen glands in the head and neck, unintentional weight loss, hemoptysis, purulent sputum production, dysphagia, chest pain, palpitations, chest tightness, abdominal pain, nausea, vomiting, and diarrhea.  Patient also denies any myalgias, changes in sense of taste and/or smell, sore throat, any other coronavirus or flu-like symptoms.  Patient denies any leg swelling, orthopnea, paroxysmal nocturnal dyspnea.  Patient is able to perform activities of daily living.        Review of Systems     Family History   Problem Relation Age of Onset    Heart failure Mother     Cancer Father         Passed with lung cancer        Social History     Socioeconomic History    Marital status:    Tobacco Use    Smoking status: Former     Packs/day: 1.50     Years: 40.00     Additional pack years: 0.00     Total pack years: 60.00     Types: Cigarettes     Start date: 1963     Quit date: 1999     Years since quittin.1     Passive exposure: Past    Smokeless tobacco: Never   Vaping Use    Vaping Use: Never used   Substance and Sexual Activity    Alcohol use: Never    Drug use: Never    Sexual activity: Not Currently     Partners: Female        Past Medical History:   Diagnosis Date    Asthma     COPD (chronic obstructive pulmonary disease)     Coronary artery disease     Coronary artery disease involving native coronary artery of native heart without angina pectoris 2021     Status post angioplasty and stent placement in  (details not available)    Diabetes mellitus     Difficulty walking     Gout     Hyperlipidemia     Hypertension     Pneumonia 2019    Primary central sleep apnea     Sleep apnea     Sleep apnea, obstructive         Immunization History   Administered Date(s) Administered    COVID-19 (Text A Cab) 2021    Fluzone (or Fluarix & Flulaval for  VFC) >6mos 11/20/2013, 10/01/2019    Fluzone High-Dose 65+yrs 01/18/2023, 02/06/2024    Influenza LAIV (Nasal) 11/06/2018, 10/02/2019    PEDS-Pneumococcal Conjugate (PCV7) 03/01/2023    Pneumococcal Conjugate 13-Valent (PCV13) 04/12/2022    Pneumococcal Conjugate 20-Valent (PCV20) 03/01/2023    Pneumococcal Polysaccharide (PPSV23) 11/20/2013    Tdap 02/21/2020       Allergies   Allergen Reactions    Atorvastatin Other (See Comments)    Hydrocodone Irritability    Keflex [Cephalexin] Rash          Current Outpatient Medications:     allopurinol (ZYLOPRIM) 100 MG tablet, Take 1 tablet by mouth Daily., Disp: , Rfl:     aspirin 81 MG chewable tablet, Chew 1 tablet Daily., Disp: , Rfl:     celecoxib (CeleBREX) 100 MG capsule, Take 1 capsule by mouth Every 12 (Twelve) Hours., Disp: , Rfl:     cetirizine (zyrTEC) 10 MG tablet, Take 1 tablet by mouth Daily., Disp: , Rfl:     dilTIAZem CD (CARDIZEM CD) 120 MG 24 hr capsule, Take 1 capsule by mouth Daily., Disp: 90 capsule, Rfl: 3    ezetimibe (ZETIA) 10 MG tablet, Take 1 tablet by mouth Daily., Disp: , Rfl:     famotidine (PEPCID) 20 MG tablet, Take 1 tablet by mouth 2 (Two) Times a Day., Disp: , Rfl:     fluticasone (FLONASE) 50 MCG/ACT nasal spray, INSTILL 2 PUFFS INTO EACH NOSTRIL ONCE DAILY, Disp: , Rfl:     Fluticasone-Salmeterol (Wixela Inhub) 250-50 MCG/ACT DISKUS, Inhale 1 puff 2 (Two) Times a Day for 90 days., Disp: 3 each, Rfl: 3    glipizide (GLUCOTROL XL) 2.5 MG 24 hr tablet, Take 1 tablet by mouth 2 (Two) Times a Day., Disp: , Rfl:     hydroCHLOROthiazide (MICROZIDE) 12.5 MG capsule, Take 1 capsule by mouth Every Morning., Disp: , Rfl:     hydroxychloroquine (PLAQUENIL) 200 MG tablet, Take 1 tablet by mouth 2 (Two) Times a Day., Disp: , Rfl:     ipratropium-albuterol (DUO-NEB) 0.5-2.5 mg/3 ml nebulizer, Take 3 mL by nebulization 4 (Four) Times a Day As Needed for Wheezing for up to 90 days., Disp: 360 mL, Rfl: 3    losartan (Cozaar) 100 MG tablet, Take 1 tablet  "by mouth Daily., Disp: 90 tablet, Rfl: 3    Misc Natural Products (BLACK CHERRY CONCENTRATE PO), Take  by mouth Daily., Disp: , Rfl:     montelukast (SINGULAIR) 10 MG tablet, Take 1 tablet by mouth Every Night., Disp: , Rfl:     Nintedanib Esylate (Ofev) 150 MG capsule, Take 150 mg by mouth 2 (Two) Times a Day., Disp: 60 capsule, Rfl: 5    Omega-3 Fatty Acids (fish oil) 1200 MG capsule capsule, Take 1 capsule by mouth Daily., Disp: , Rfl:     Red Yeast Rice Extract 600 MG capsule, Take  by mouth Daily., Disp: , Rfl:     tiotropium bromide monohydrate (Spiriva Respimat) 2.5 MCG/ACT aerosol solution inhaler, Inhale 2 puffs Daily for 90 days., Disp: 3 each, Rfl: 3    Ventolin  (90 Base) MCG/ACT inhaler, Inhale 2 puffs Every 6 (Six) Hours As Needed for Wheezing or Shortness of Air for up to 90 days., Disp: 54 g, Rfl: 3    vitamin B-12 (CYANOCOBALAMIN) 1000 MCG tablet, Take 1 tablet by mouth Daily., Disp: , Rfl:     VITAMIN D, CHOLECALCIFEROL, PO, Take  by mouth Daily., Disp: , Rfl:     clindamycin (CLEOCIN) 150 MG capsule, , Disp: , Rfl:      Objective     Physical Exam  Vital Signs:   WDWN, Alert, NAD.    HEENT:  PERRL, EOMI.  OP, nares clear, no sinus tenderness  Neck:  Supple, no JVD, no thyromegaly.  Lymph: no axillary, cervical, supraclavicular lymphadenopathy noted bilaterally  Chest: Mildly decreased breath sounds throughout. No wheezes, rales, or rhonchi appreciated.  Normal work of breathing noted.  Patient is able speak full sentences without difficulty.  CV: RRR, no MGR, pulses 2+, equal.  Abd:  Soft, NT, ND, + BS, no HSM  EXT:  no clubbing, no cyanosis, no edema, no joint tenderness  Neuro:  A&Ox3, CN grossly intact, no focal deficits.  Skin: No rashes or lesions noted.    /82 (BP Location: Left arm, Patient Position: Sitting, Cuff Size: Large Adult)   Pulse 92   Resp 20   Ht 180.3 cm (70.98\")   Wt 119 kg (263 lb 1.6 oz)   SpO2 96% Comment: room air  BMI 36.71 kg/m²         Result Review : "   I have reviewed Dr. Jensen's last office visit note.  I also reviewed pulmonary function test report and 6-minute walk test report dated from 1/18/2024.  I also reviewed chest CT report dated from 1/29/2024.  See scanned reports.    Procedures:           Assessment and Plan      Assessment:  1. Combined pulmonary fibrosis with emphysema.  FEV1 of 71% predicted.  Patient on triple inhaler therapy. Recent chest CT scan showing progression of pulmonary fibrosis.   2.  Chronic dyspnea.  3.  Occupational exposures to multiple chemicals inhalants.  4.  Obstructive sleep apnea.  Patient is on a CPAP machine.  5.  History of COVID-19 back in December 2022.  Treated with Paxlovid by PCP per patient report.  6.  Seasonal allergies.  Patient is under the care of an allergist and is receiving allergy shots.  7.  Coronary artery disease: Patient is under the care of Dr. Nguyen.  8.  Tobacco abuse of cigarettes in remission.  Not eligible for lung cancer screening as patient reports that he quit smoking 1999.  9.  Encounter immunization: Flu vaccine given to the patient in the office today.          Plan:  1.  Patient reports that he is only been taking Ofev once daily recently due to experiencing diarrhea.  Patient states that he is going to try and increase the dosage back up to twice daily to see how he tolerates medication.  Patient was advised that he cannot tolerate medication to notify our office immediately as we may have to decrease  dosing down to 100 mg twice daily instead.  Patient verbalized understanding and compliance.  2.  Will order CBC and CMP for medication monitoring.  Orders placed today.  3.  Continue Wixela and Spiriva [facility] day as prescribed and rinse mouth out after each use.  4. Continue albuterol inhaler and Duoneb nebulizer treatments as needed.  5.  Continue Singulair, Zyrtec, and Flonase nasal spray as prescribed and follow-up with allergist and continue allergy shots  as scheduled.  6.  Will repeat chest CT scan, pulmonary function test, and a 6-minute walk test again in January 2025.  Orders placed.    7.  Continue CPAP at night and with naps at current settings and clean mask and tubing daily.  Will request a copy of CPAP compliance report and notify patient if any changes need to be made.  8.  Follow-up with cardiologist as scheduled.  9.  Vaccination status: Flu vaccine given to the patient in the office today.  Patient reports they are up-to-date with pneumonia and Covid vaccines.  Patient is advised to continue to follow CDC recommendations such as social distancing wearing a mask and washing hands for at least 20 seconds.  10.  Smoking status: Patient is a former cigarette smoker.  Not eligible for lung cancer screening as patient reports that he quit smoking 1999.  11.  Patient to call the office, 911, or go to the ER with new or worsening symptoms.  12.  Follow-up in 3 to 4 months, sooner if needed.          Follow Up   Return for 3-4 months with Dr. Jensen.  Patient was given instructions and counseling regarding his condition or for health maintenance advice. Please see specific information pulled into the AVS if appropriate.

## 2024-01-31 DIAGNOSIS — J84.10 PULMONARY FIBROSIS: Primary | ICD-10-CM

## 2024-02-06 ENCOUNTER — TELEPHONE (OUTPATIENT)
Dept: PULMONOLOGY | Facility: CLINIC | Age: 79
End: 2024-02-06

## 2024-02-06 ENCOUNTER — LAB (OUTPATIENT)
Dept: LAB | Facility: HOSPITAL | Age: 79
End: 2024-02-06
Payer: MEDICARE

## 2024-02-06 ENCOUNTER — OFFICE VISIT (OUTPATIENT)
Dept: PULMONOLOGY | Facility: CLINIC | Age: 79
End: 2024-02-06
Payer: MEDICARE

## 2024-02-06 VITALS
RESPIRATION RATE: 20 BRPM | OXYGEN SATURATION: 96 % | SYSTOLIC BLOOD PRESSURE: 159 MMHG | BODY MASS INDEX: 36.83 KG/M2 | HEIGHT: 71 IN | HEART RATE: 92 BPM | WEIGHT: 263.1 LBS | DIASTOLIC BLOOD PRESSURE: 82 MMHG

## 2024-02-06 DIAGNOSIS — Z23 ENCOUNTER FOR IMMUNIZATION: ICD-10-CM

## 2024-02-06 DIAGNOSIS — Z23 FLU VACCINE NEED: ICD-10-CM

## 2024-02-06 DIAGNOSIS — J84.10 PULMONARY FIBROSIS: ICD-10-CM

## 2024-02-06 DIAGNOSIS — F17.201 TOBACCO ABUSE, IN REMISSION: ICD-10-CM

## 2024-02-06 DIAGNOSIS — I25.10 CORONARY ARTERY DISEASE INVOLVING NATIVE CORONARY ARTERY OF NATIVE HEART WITHOUT ANGINA PECTORIS: Chronic | ICD-10-CM

## 2024-02-06 DIAGNOSIS — G47.33 OSA (OBSTRUCTIVE SLEEP APNEA): ICD-10-CM

## 2024-02-06 DIAGNOSIS — J30.2 SEASONAL ALLERGIES: Primary | ICD-10-CM

## 2024-02-06 DIAGNOSIS — J30.2 SEASONAL ALLERGIES: ICD-10-CM

## 2024-02-06 DIAGNOSIS — J44.9 CHRONIC OBSTRUCTIVE PULMONARY DISEASE, UNSPECIFIED COPD TYPE: ICD-10-CM

## 2024-02-06 DIAGNOSIS — R06.09 CHRONIC DYSPNEA: ICD-10-CM

## 2024-02-06 LAB
ALBUMIN SERPL-MCNC: 4.4 G/DL (ref 3.5–5.2)
ALBUMIN/GLOB SERPL: 2 G/DL
ALP SERPL-CCNC: 85 U/L (ref 39–117)
ALT SERPL W P-5'-P-CCNC: 13 U/L (ref 1–41)
ANION GAP SERPL CALCULATED.3IONS-SCNC: 11 MMOL/L (ref 5–15)
AST SERPL-CCNC: 16 U/L (ref 1–40)
BASOPHILS # BLD AUTO: 0.04 10*3/MM3 (ref 0–0.2)
BASOPHILS NFR BLD AUTO: 0.3 % (ref 0–1.5)
BILIRUB SERPL-MCNC: 0.5 MG/DL (ref 0–1.2)
BUN SERPL-MCNC: 13 MG/DL (ref 8–23)
BUN/CREAT SERPL: 9.8 (ref 7–25)
CALCIUM SPEC-SCNC: 9.9 MG/DL (ref 8.6–10.5)
CHLORIDE SERPL-SCNC: 103 MMOL/L (ref 98–107)
CO2 SERPL-SCNC: 27 MMOL/L (ref 22–29)
CREAT SERPL-MCNC: 1.33 MG/DL (ref 0.76–1.27)
DEPRECATED RDW RBC AUTO: 40 FL (ref 37–54)
EGFRCR SERPLBLD CKD-EPI 2021: 55.4 ML/MIN/1.73
EOSINOPHIL # BLD AUTO: 0.4 10*3/MM3 (ref 0–0.4)
EOSINOPHIL NFR BLD AUTO: 3.3 % (ref 0.3–6.2)
ERYTHROCYTE [DISTWIDTH] IN BLOOD BY AUTOMATED COUNT: 12.7 % (ref 12.3–15.4)
GLOBULIN UR ELPH-MCNC: 2.2 GM/DL
GLUCOSE SERPL-MCNC: 65 MG/DL (ref 65–99)
HCT VFR BLD AUTO: 43.8 % (ref 37.5–51)
HGB BLD-MCNC: 14.4 G/DL (ref 13–17.7)
IMM GRANULOCYTES # BLD AUTO: 0.07 10*3/MM3 (ref 0–0.05)
IMM GRANULOCYTES NFR BLD AUTO: 0.6 % (ref 0–0.5)
LYMPHOCYTES # BLD AUTO: 2.53 10*3/MM3 (ref 0.7–3.1)
LYMPHOCYTES NFR BLD AUTO: 20.6 % (ref 19.6–45.3)
MCH RBC QN AUTO: 28.7 PG (ref 26.6–33)
MCHC RBC AUTO-ENTMCNC: 32.9 G/DL (ref 31.5–35.7)
MCV RBC AUTO: 87.3 FL (ref 79–97)
MONOCYTES # BLD AUTO: 0.72 10*3/MM3 (ref 0.1–0.9)
MONOCYTES NFR BLD AUTO: 5.9 % (ref 5–12)
NEUTROPHILS NFR BLD AUTO: 69.3 % (ref 42.7–76)
NEUTROPHILS NFR BLD AUTO: 8.52 10*3/MM3 (ref 1.7–7)
NRBC BLD AUTO-RTO: 0.1 /100 WBC (ref 0–0.2)
PLATELET # BLD AUTO: 215 10*3/MM3 (ref 140–450)
PMV BLD AUTO: 10.1 FL (ref 6–12)
POTASSIUM SERPL-SCNC: 4.4 MMOL/L (ref 3.5–5.2)
PROT SERPL-MCNC: 6.6 G/DL (ref 6–8.5)
RBC # BLD AUTO: 5.02 10*6/MM3 (ref 4.14–5.8)
SODIUM SERPL-SCNC: 141 MMOL/L (ref 136–145)
WBC NRBC COR # BLD AUTO: 12.28 10*3/MM3 (ref 3.4–10.8)

## 2024-02-06 PROCEDURE — 3079F DIAST BP 80-89 MM HG: CPT | Performed by: NURSE PRACTITIONER

## 2024-02-06 PROCEDURE — 1159F MED LIST DOCD IN RCRD: CPT | Performed by: NURSE PRACTITIONER

## 2024-02-06 PROCEDURE — G0008 ADMIN INFLUENZA VIRUS VAC: HCPCS | Performed by: NURSE PRACTITIONER

## 2024-02-06 PROCEDURE — 90662 IIV NO PRSV INCREASED AG IM: CPT | Performed by: NURSE PRACTITIONER

## 2024-02-06 PROCEDURE — 36415 COLL VENOUS BLD VENIPUNCTURE: CPT

## 2024-02-06 PROCEDURE — 3077F SYST BP >= 140 MM HG: CPT | Performed by: NURSE PRACTITIONER

## 2024-02-06 PROCEDURE — 85025 COMPLETE CBC W/AUTO DIFF WBC: CPT

## 2024-02-06 PROCEDURE — 99214 OFFICE O/P EST MOD 30 MIN: CPT | Performed by: NURSE PRACTITIONER

## 2024-02-06 PROCEDURE — 1160F RVW MEDS BY RX/DR IN RCRD: CPT | Performed by: NURSE PRACTITIONER

## 2024-02-06 PROCEDURE — 80053 COMPREHEN METABOLIC PANEL: CPT

## 2024-02-06 RX ORDER — TIOTROPIUM BROMIDE INHALATION SPRAY 3.12 UG/1
2 SPRAY, METERED RESPIRATORY (INHALATION)
Qty: 3 EACH | Refills: 3 | Status: SHIPPED | OUTPATIENT
Start: 2024-02-06 | End: 2024-05-06

## 2024-02-06 RX ORDER — FLUTICASONE PROPIONATE AND SALMETEROL 250; 50 UG/1; UG/1
1 POWDER RESPIRATORY (INHALATION) 2 TIMES DAILY
Qty: 3 EACH | Refills: 3 | Status: SHIPPED | OUTPATIENT
Start: 2024-02-06 | End: 2024-05-06

## 2024-02-06 RX ORDER — ALBUTEROL SULFATE 90 UG/1
2 AEROSOL, METERED RESPIRATORY (INHALATION) EVERY 6 HOURS PRN
Qty: 54 G | Refills: 3 | Status: SHIPPED | OUTPATIENT
Start: 2024-02-06 | End: 2024-05-06

## 2024-03-05 ENCOUNTER — OFFICE VISIT (OUTPATIENT)
Dept: CARDIOLOGY | Facility: CLINIC | Age: 79
End: 2024-03-05
Payer: MEDICARE

## 2024-03-05 VITALS
HEIGHT: 70 IN | DIASTOLIC BLOOD PRESSURE: 78 MMHG | SYSTOLIC BLOOD PRESSURE: 147 MMHG | WEIGHT: 255.6 LBS | HEART RATE: 97 BPM | BODY MASS INDEX: 36.59 KG/M2

## 2024-03-05 DIAGNOSIS — I10 ESSENTIAL HYPERTENSION: ICD-10-CM

## 2024-03-05 DIAGNOSIS — E78.2 MIXED HYPERLIPIDEMIA: ICD-10-CM

## 2024-03-05 DIAGNOSIS — I25.10 CORONARY ARTERY DISEASE INVOLVING NATIVE CORONARY ARTERY OF NATIVE HEART WITHOUT ANGINA PECTORIS: Primary | Chronic | ICD-10-CM

## 2024-03-05 PROCEDURE — 3077F SYST BP >= 140 MM HG: CPT | Performed by: INTERNAL MEDICINE

## 2024-03-05 PROCEDURE — 1160F RVW MEDS BY RX/DR IN RCRD: CPT | Performed by: INTERNAL MEDICINE

## 2024-03-05 PROCEDURE — 99213 OFFICE O/P EST LOW 20 MIN: CPT | Performed by: INTERNAL MEDICINE

## 2024-03-05 PROCEDURE — 3078F DIAST BP <80 MM HG: CPT | Performed by: INTERNAL MEDICINE

## 2024-03-05 PROCEDURE — 1159F MED LIST DOCD IN RCRD: CPT | Performed by: INTERNAL MEDICINE

## 2024-03-05 NOTE — ASSESSMENT & PLAN NOTE
Pressure is reasonably well-controlled, significantly improved from before.  Heart rate is also within acceptable ranges.  Continue losartan 100 mg daily, along with hydrochlorothiazide and Zetia.

## 2024-03-05 NOTE — ASSESSMENT & PLAN NOTE
He is currently stable with no angina but LV systolic function is preserved.  Continue aspirin,.  He is unable to tolerate any statins.

## 2024-03-05 NOTE — PROGRESS NOTES
CARDIOLOGY FOLLOW-UP PROGRESS NOTE      Chief Complaint  Coronary Artery Disease, Hypertension, and Follow-up    Subjective            Abdi Gomez presents to [facility] CARDIOLOGY  History of Present Illness    Mr Gomez is here for 6 month follow up visit.  Today he reports feeling better.  Shortness of breath improved.  No palpitations or dizziness.  No recent hospitalizations or ER visits.  He is taking all the medications as prescribed.    Past History:    1) Coronary artery disease, status post angioplasty and stent placement in 1999 (details not available); 2) Hypertension; 3) Hyperlipidemia; 4) Diabetes mellitus; 5) Chronic sinus problems. 6) Obstructive sleep apnea     Medical History:  Past Medical History:   Diagnosis Date    Asthma     COPD (chronic obstructive pulmonary disease)     Coronary artery disease     Coronary artery disease involving native coronary artery of native heart without angina pectoris 11/29/2021     Status post angioplasty and stent placement in 1999 (details not available)    Diabetes mellitus     Difficulty walking 2022    Gout 2022    Hyperlipidemia     Hypertension     Pneumonia 2019    Primary central sleep apnea     Sleep apnea     Sleep apnea, obstructive        Surgical History: has a past surgical history that includes Coronary stent placement and Cardiac catheterization.     Family History: family history includes Cancer in his father; Heart failure in his mother.     Social History: reports that he quit smoking about 25 years ago. His smoking use included cigarettes. He started smoking about 61 years ago. He has a 60 pack-year smoking history. He has been exposed to tobacco smoke. He has never used smokeless tobacco. He reports that he does not drink alcohol and does not use drugs.    Allergies: Atorvastatin, Hydrocodone, and Keflex [cephalexin]    Current Outpatient Medications on File Prior to Visit   Medication Sig    allopurinol (ZYLOPRIM) 100  MG tablet Take 1 tablet by mouth Daily.    aspirin 81 MG chewable tablet Chew 1 tablet Daily.    cetirizine (zyrTEC) 10 MG tablet Take 1 tablet by mouth Daily.    dilTIAZem CD (CARDIZEM CD) 120 MG 24 hr capsule Take 1 capsule by mouth Daily.    ezetimibe (ZETIA) 10 MG tablet Take 1 tablet by mouth Daily.    famotidine (PEPCID) 20 MG tablet Take 1 tablet by mouth 2 (Two) Times a Day.    fluticasone (FLONASE) 50 MCG/ACT nasal spray INSTILL 2 PUFFS INTO EACH NOSTRIL ONCE DAILY    Fluticasone-Salmeterol (Wixela Inhub) 250-50 MCG/ACT DISKUS Inhale 1 puff 2 (Two) Times a Day for 90 days.    glipizide (GLUCOTROL XL) 2.5 MG 24 hr tablet Take 1 tablet by mouth 2 (Two) Times a Day.    hydroCHLOROthiazide (MICROZIDE) 12.5 MG capsule Take 1 capsule by mouth Every Morning.    hydroxychloroquine (PLAQUENIL) 200 MG tablet Take 1 tablet by mouth 2 (Two) Times a Day.    ipratropium-albuterol (DUO-NEB) 0.5-2.5 mg/3 ml nebulizer Take 3 mL by nebulization 4 (Four) Times a Day As Needed for Wheezing for up to 90 days.    losartan (Cozaar) 100 MG tablet Take 1 tablet by mouth Daily.    Misc Natural Products (BLACK CHERRY CONCENTRATE PO) Take  by mouth Daily.    montelukast (SINGULAIR) 10 MG tablet Take 1 tablet by mouth Every Night.    Nintedanib Esylate (Ofev) 150 MG capsule Take 150 mg by mouth 2 (Two) Times a Day.    Omega-3 Fatty Acids (fish oil) 1200 MG capsule capsule Take 1 capsule by mouth Daily.    Red Yeast Rice Extract 600 MG capsule Take  by mouth Daily.    tiotropium bromide monohydrate (Spiriva Respimat) 2.5 MCG/ACT aerosol solution inhaler Inhale 2 puffs Daily for 90 days.    Ventolin  (90 Base) MCG/ACT inhaler Inhale 2 puffs Every 6 (Six) Hours As Needed for Wheezing or Shortness of Air for up to 90 days.    vitamin B-12 (CYANOCOBALAMIN) 1000 MCG tablet Take 1 tablet by mouth Daily.    VITAMIN D, CHOLECALCIFEROL, PO Take  by mouth Daily.         Review of Systems   Respiratory:  Positive for shortness of breath  "(Improving). Negative for cough and wheezing.    Cardiovascular:  Negative for chest pain, palpitations and leg swelling.   Gastrointestinal:  Negative for nausea and vomiting.   Neurological:  Negative for dizziness and syncope.        Objective     /78 (BP Location: Left arm)   Pulse 97   Ht 177.8 cm (70\")   Wt 116 kg (255 lb 9.6 oz)   BMI 36.67 kg/m²       Physical Exam    General : Alert, awake, no acute distress  Neck : Supple, no carotid bruit, no jugular venous distention  CVS : Regular rate and rhythm, no murmur, rubs or gallops  Lungs: Bilateral faint wheezing heard, no crackles   Abdomen: Soft, nontender, bowel sounds heard in all 4 quadrants  Extremities: Warm, well-perfused, no pedal edema    Result Review :     The following data was reviewed by: Sanford Nguyen MD on 03/05/2024:    CMP          5/10/2023    10:00 9/1/2023    11:24 2/6/2024    11:06   CMP   Glucose 138  67  65    BUN 13  16  13    Creatinine 1.23  1.36  1.33    EGFR 60.8  53.9  55.4    Sodium 141  138  141    Potassium 4.4  4.4  4.4    Chloride 103  101  103    Calcium 10.2  10.4  9.9    Total Protein 7.1  7.2  6.6    Albumin 4.2  4.7  4.4    Globulin 2.9  2.5  2.2    Total Bilirubin 0.8  1.0  0.5    Alkaline Phosphatase 79  80  85    AST (SGOT) 17  20  16    ALT (SGPT) 16  12  13    Albumin/Globulin Ratio 1.4  1.9  2.0    BUN/Creatinine Ratio 10.6  11.8  9.8    Anion Gap 10.5  11.0  11.0      CBC          5/10/2023    10:00 9/1/2023    11:24 2/6/2024    11:06   CBC   WBC 9.06  7.91  12.28    RBC 5.05  4.75  5.02    Hemoglobin 15.6  14.7  14.4    Hematocrit 44.4  42.9  43.8    MCV 87.9  90.3  87.3    MCH 30.9  30.9  28.7    MCHC 35.1  34.3  32.9    RDW 13.0  12.9  12.7    Platelets 236  225  215               Data reviewed: Cardiology studies        Results for orders placed in visit on 06/15/22    Adult Transthoracic Echo Complete W/ Cont if Necessary Per Protocol    Interpretation Summary  · Left ventricular ejection " fraction appears to be 56 - 60%. Left ventricular systolic function is normal.  · Left ventricular diastolic function is consistent with (grade I) impaired relaxation.  · There are no significant valvular abnormalities.  · There is mild dilation of aortic root and ascending aorta.  · Estimated right ventricular systolic pressure from tricuspid regurgitation is normal (<35 mmHg).                   Assessment and Plan        Diagnoses and all orders for this visit:    1. Coronary artery disease involving native coronary artery of native heart without angina pectoris (Primary)  Assessment & Plan:  He is currently stable with no angina but LV systolic function is preserved.  Continue aspirin,.  He is unable to tolerate any statins.      2. Mixed hyperlipidemia  Assessment & Plan:  His recent LDL is 111, which is above goal but improved from before.  He is unable to tolerate any statins.  Continue Zetia with red yeast rice.      3. Essential hypertension  Assessment & Plan:  Pressure is reasonably well-controlled, significantly improved from before.  Heart rate is also within acceptable ranges.  Continue losartan 100 mg daily, along with hydrochlorothiazide and Zetia.                Follow Up     Return in about 6 months (around 9/5/2024) for Next scheduled follow up, with Shima BRAUN.    Patient was given instructions and counseling regarding his condition or for health maintenance advice. Please see specific information pulled into the AVS if appropriate.

## 2024-03-05 NOTE — ASSESSMENT & PLAN NOTE
His recent LDL is 111, which is above goal but improved from before.  He is unable to tolerate any statins.  Continue Zetia with red yeast rice.

## 2024-03-18 ENCOUNTER — OFFICE VISIT (OUTPATIENT)
Dept: PODIATRY | Facility: CLINIC | Age: 79
End: 2024-03-18
Payer: MEDICARE

## 2024-03-18 VITALS
DIASTOLIC BLOOD PRESSURE: 98 MMHG | HEART RATE: 89 BPM | BODY MASS INDEX: 37.31 KG/M2 | WEIGHT: 260 LBS | TEMPERATURE: 98.5 F | SYSTOLIC BLOOD PRESSURE: 157 MMHG | OXYGEN SATURATION: 98 %

## 2024-03-18 DIAGNOSIS — M79.671 FOOT PAIN, BILATERAL: ICD-10-CM

## 2024-03-18 DIAGNOSIS — M79.672 FOOT PAIN, BILATERAL: ICD-10-CM

## 2024-03-18 DIAGNOSIS — B35.1 ONYCHOMYCOSIS: ICD-10-CM

## 2024-03-18 DIAGNOSIS — E11.8 DIABETIC FOOT: Primary | ICD-10-CM

## 2024-03-18 DIAGNOSIS — L60.0 ONYCHOCRYPTOSIS: ICD-10-CM

## 2024-03-18 DIAGNOSIS — G62.9 NEUROPATHY: ICD-10-CM

## 2024-03-18 DIAGNOSIS — E11.9 NON-INSULIN DEPENDENT TYPE 2 DIABETES MELLITUS: ICD-10-CM

## 2024-03-18 PROCEDURE — 3077F SYST BP >= 140 MM HG: CPT | Performed by: PODIATRIST

## 2024-03-18 PROCEDURE — 1159F MED LIST DOCD IN RCRD: CPT | Performed by: PODIATRIST

## 2024-03-18 PROCEDURE — 1160F RVW MEDS BY RX/DR IN RCRD: CPT | Performed by: PODIATRIST

## 2024-03-18 PROCEDURE — 11721 DEBRIDE NAIL 6 OR MORE: CPT | Performed by: PODIATRIST

## 2024-03-18 PROCEDURE — 99213 OFFICE O/P EST LOW 20 MIN: CPT | Performed by: PODIATRIST

## 2024-03-18 PROCEDURE — G8404 LOW EXTEMITY NEUR EXAM DOCUM: HCPCS | Performed by: PODIATRIST

## 2024-03-18 PROCEDURE — 3080F DIAST BP >= 90 MM HG: CPT | Performed by: PODIATRIST

## 2024-03-18 NOTE — PROGRESS NOTES
[facility] - PODIATRY    Today's Date: 24    Patient Name: Abdi Gomez  MRN: [id number]  CSN: [id number]  PCP: Jaimie Stallworth APRN, Last PCP Visit: 2024  Referring Provider: No ref. provider found    SUBJECTIVE     Chief Complaint   Patient presents with    Left Foot - Follow-up, Nail Problem    Right Foot - Follow-up, Nail Problem     HPI: Abdi Gomez, a 78 y.o.male, presents to clinic for painful toenail and a diabetic foot evaluation.    New, Established, New Problem:  est  Location:  Toenails  Duration:   Greater than five years  Onset:  Gradual  Nature:  sore with palpation.  Stable, worsening, improving:   improving  Aggravating factors:  Pain with shoe gear and ambulation.  Previous Treatment: debridement    Patient controlling diabetes via: Oral medication    Medical changes:  none    Patient states their most recent blood glucose readin    Patient denies any fevers, chills, nausea, vomiting, shortness of breath, nor any other constitutional signs nor symptoms.    Relates numbness in his first toes bilaterally.    Past Medical History:   Diagnosis Date    Asthma     COPD (chronic obstructive pulmonary disease)     Coronary artery disease     Coronary artery disease involving native coronary artery of native heart without angina pectoris 2021     Status post angioplasty and stent placement in  (details not available)    Diabetes mellitus     Difficulty walking     Gout     Hyperlipidemia     Hypertension     Pneumonia     Primary central sleep apnea     Sleep apnea     Sleep apnea, obstructive      Past Surgical History:   Procedure Laterality Date    CARDIAC CATHETERIZATION      CORONARY STENT PLACEMENT       Family History   Problem Relation Age of Onset    Heart failure Mother     Cancer Father         Passed with lung cancer     Social History     Socioeconomic History    Marital status:    Tobacco Use    Smoking  status: Former     Current packs/day: 0.00     Average packs/day: 1.5 packs/day for 40.0 years (60.0 ttl pk-yrs)     Types: Cigarettes     Start date: 1963     Quit date: 1999     Years since quittin.2     Passive exposure: Past    Smokeless tobacco: Never   Vaping Use    Vaping status: Never Used   Substance and Sexual Activity    Alcohol use: Never    Drug use: Never    Sexual activity: Not Currently     Partners: Female     Allergies   Allergen Reactions    Atorvastatin Other (See Comments)    Hydrocodone Irritability    Keflex [Cephalexin] Rash     Current Outpatient Medications   Medication Sig Dispense Refill    allopurinol (ZYLOPRIM) 100 MG tablet Take 1 tablet by mouth Daily.      aspirin 81 MG chewable tablet Chew 1 tablet Daily.      cetirizine (zyrTEC) 10 MG tablet Take 1 tablet by mouth Daily.      dilTIAZem CD (CARDIZEM CD) 120 MG 24 hr capsule Take 1 capsule by mouth Daily. 90 capsule 3    ezetimibe (ZETIA) 10 MG tablet Take 1 tablet by mouth Daily.      famotidine (PEPCID) 20 MG tablet Take 1 tablet by mouth 2 (Two) Times a Day.      fluticasone (FLONASE) 50 MCG/ACT nasal spray INSTILL 2 PUFFS INTO EACH NOSTRIL ONCE DAILY      Fluticasone-Salmeterol (Wixela Inhub) 250-50 MCG/ACT DISKUS Inhale 1 puff 2 (Two) Times a Day for 90 days. 3 each 3    glipizide (GLUCOTROL XL) 2.5 MG 24 hr tablet Take 1 tablet by mouth 2 (Two) Times a Day.      hydroCHLOROthiazide (MICROZIDE) 12.5 MG capsule Take 1 capsule by mouth Every Morning.      hydroxychloroquine (PLAQUENIL) 200 MG tablet Take 1 tablet by mouth 2 (Two) Times a Day.      losartan (Cozaar) 100 MG tablet Take 1 tablet by mouth Daily. 90 tablet 3    Misc Natural Products (BLACK CHERRY CONCENTRATE PO) Take  by mouth Daily.      montelukast (SINGULAIR) 10 MG tablet Take 1 tablet by mouth Every Night.      Nintedanib Esylate (Ofev) 150 MG capsule Take 150 mg by mouth 2 (Two) Times a Day. 60 capsule 5    Omega-3 Fatty Acids (fish oil) 1200 MG  "capsule capsule Take 1 capsule by mouth Daily.      Red Yeast Rice Extract 600 MG capsule Take  by mouth Daily.      tiotropium bromide monohydrate (Spiriva Respimat) 2.5 MCG/ACT aerosol solution inhaler Inhale 2 puffs Daily for 90 days. 3 each 3    Ventolin  (90 Base) MCG/ACT inhaler Inhale 2 puffs Every 6 (Six) Hours As Needed for Wheezing or Shortness of Air for up to 90 days. 54 g 3    vitamin B-12 (CYANOCOBALAMIN) 1000 MCG tablet Take 1 tablet by mouth Daily.      VITAMIN D, CHOLECALCIFEROL, PO Take  by mouth Daily.      ipratropium-albuterol (DUO-NEB) 0.5-2.5 mg/3 ml nebulizer Take 3 mL by nebulization 4 (Four) Times a Day As Needed for Wheezing for up to 90 days. 360 mL 3     No current facility-administered medications for this visit.     Review of Systems   Constitutional: Negative.    Skin:         Painful toenails   Neurological:  Positive for numbness.   All other systems reviewed and are negative.      OBJECTIVE     Vitals:    24 1105   BP: 157/98   Pulse: 89   Temp: 98.5 °F (36.9 °C)   SpO2: 98%       Body mass index is 37.31 kg/m².    No results found for: \"HGBA1C\"    Lab Results   Component Value Date    GLUCOSE 65 2024    CALCIUM 9.9 2024     2024    K 4.4 2024    CO2 27.0 2024     2024    BUN 13 2024    CREATININE 1.33 (H) 2024    EGFRIFNONA 60 (L) 2021    BCR 9.8 2024    ANIONGAP 11.0 2024       Patient seen in no apparent distress.      PHYSICAL EXAM:     Foot/Ankle Exam    GENERAL  Diabetic foot exam performed    Appearance:  obese and elderly  Orientation:  AAOx3  Affect:  appropriate  Gait:  unimpaired  Assistance:  independent  Right shoe gear: casual shoe  Left shoe gear: casual shoe    VASCULAR     Right Foot Vascularity   Dorsalis pedis:  2+  Posterior tibial:  2+  Skin temperature:  warm  Edema gradin+ and non-pitting  CFT:  < 3 seconds  Pedal hair growth:  Absent  Varicosities:  mild " varicosities     Left Foot Vascularity   Dorsalis pedis:  2+  Posterior tibial:  2+  Skin temperature:  warm  Edema gradin+ and non-pitting  CFT:  < 3 seconds  Pedal hair growth:  Absent  Varicosities:  mild varicosities     NEUROLOGIC     Right Foot Neurologic   Light touch sensation: diminished  Vibratory sensation: diminished  Hot/Cold sensation: diminished  Protective Sensation using [place]-Shun Monofilament:   Sites intact: 2  Sites tested: 10     Left Foot Neurologic   Light touch sensation: diminished  Vibratory sensation: diminished  Hot/Cold sensation:  diminished  Protective Sensation using [place]un Monofilament:   Sites intact: 2  Sites tested: 10    MUSCLE STRENGTH     Right Foot Muscle Strength   Foot dorsiflexion:  4  Foot plantar flexion:  4  Foot inversion:  4  Foot eversion:  4     Left Foot Muscle Strength   Foot dorsiflexion:  4  Foot plantar flexion:  4  Foot inversion:  4  Foot eversion:  4    RANGE OF MOTION     Right Foot Range of Motion   Foot and ankle ROM within normal limits       Left Foot Range of Motion   Foot and ankle ROM within normal limits      DERMATOLOGIC      Right Foot Dermatologic   Skin  Right foot skin is intact.   Nails  1.  Positive for elongated, onychomycosis, abnormal thickness, subungual debris and ingrown toenail.  2.  Positive for elongated, onychomycosis, abnormal thickness, subungual debris and ingrown toenail.     Left Foot Dermatologic   Skin  Left foot skin is intact.   Nails  1.  Positive for elongated, onychomycosis, abnormal thickness, subungual debris and ingrown toenail.  2.  Positive for elongated, onychomycosis, abnormal thickness, subungual debris and ingrown toenail.  3.  Positive for elongated, onychomycosis, abnormal thickness, subungual debris and ingrown toenail.  4.  Positive for elongated, onychomycosis, abnormally thick, subungual debris and ingrown toenail.    Diabetic Foot Exam Performed and Monofilament Test Performed    I  have reexamined the patient the results are consistent with the previously documented exam.      ASSESSMENT/PLAN     Diagnoses and all orders for this visit:    1. Diabetic foot (Primary)    2. Neuropathy    3. Foot pain, bilateral    4. Onychocryptosis    5. Non-insulin dependent type 2 diabetes mellitus    6. Onychomycosis    Comprehensive lower extremity examination and evaluation was performed.    Discussed findings and treatment plan including risks, benefits, and treatment options with patient in detail. Patient agreed with treatment plan.    Medications and allergies reviewed.  Reviewed available blood glucose and HgB A1C lab values along with other pertinent labs.  These were discussed with the patient as to their importance of diabetic maintenance.    Toenails 1, 2 on Right and 1, 2, 3, 4 on Left were debrided with nail nippers then filed with a Scan[employer]Jour nail doris.  Patient tolerated procedure well without complications.    Diabetic foot exam performed and documented this date, compliant with CQM required standards. Detail of findings as noted in physical exam.  Lower extremity Neurologic exam for diabetic patient performed and documented this date, compliant with PQRS required standards. Detail of findings as noted in physical exam.  Advised patient importance of good routine lower extremity hygiene. Advised patient importance of evaluating for intact skin and pain free nail borders.  Advised patient to use mirror to evaluate plantar/ soles of feet for better visualization. Advised patient monitor and phone office to be seen if any cracking to skin, open lesions, painful nail borders or if nails become elongated prior to next visit. Advised patient importance of daily cleansing of lower extremities, followed by good skin cream to maintain normal hydration of skin. Also advised patient importance of close daily monitoring of blood sugar. Advised to regulate diet and medications to maintain control of blood  sugar in optimal range. Contact primary care provider if difficulties maintaining blood sugar levels.  Advised Patient of presence of Diabetes Mellitus condition.  Advised Patient risk of progression and worsening or improvement, then return of condition.  Will monitor condition for any change in future. Treat with most appropriate treatment pending status of condition.  Counseled and advised patient extensively on nature and ramifications of diabetes. Standard instructions given to patient for good diabetic foot care and maintenance. Advised importance of careful monitoring to avoid break down and complications secondary to diabetes. Advised patient importance of strict maintenance of blood sugar control. Advised patient of possible ominous results from neglect of condition, i.e.: amputation/ loss of digits, feet and legs, or even death.  Patient states understands counseling, will monitor closely, continue good hygiene and routine diabetic foot care. Patient will contact office should they have any questions or problems.      An After Visit Summary was printed and given to the patient at discharge, including (if requested) any available informative/educational handouts regarding diagnosis, treatment, or medications. All questions were answered to patient/family satisfaction. Should symptoms fail to improve or worsen they agree to call or return to clinic or to go to the Emergency Department. Discussed the importance of following up with any needed screening tests/labs/specialist appointments and any requested follow-up recommended by me today. Importance of maintaining follow-up discussed and patient accepts that missed appointments can delay diagnosis and potentially lead to worsening of conditions.    Return in about 9 weeks (around 5/20/2024) for Toenail Care., or sooner if acute issues arise.    This document has been electronically signed by Richie Watson DPM on March 18, 2024 11:25 EDT

## 2024-05-09 ENCOUNTER — OFFICE VISIT (OUTPATIENT)
Dept: PULMONOLOGY | Facility: CLINIC | Age: 79
End: 2024-05-09
Payer: MEDICARE

## 2024-05-09 VITALS
RESPIRATION RATE: 18 BRPM | OXYGEN SATURATION: 98 % | SYSTOLIC BLOOD PRESSURE: 162 MMHG | TEMPERATURE: 97.8 F | HEART RATE: 95 BPM | HEIGHT: 71 IN | DIASTOLIC BLOOD PRESSURE: 84 MMHG | BODY MASS INDEX: 36.82 KG/M2 | WEIGHT: 263 LBS

## 2024-05-09 DIAGNOSIS — F17.201 TOBACCO ABUSE, IN REMISSION: ICD-10-CM

## 2024-05-09 DIAGNOSIS — G47.33 OSA (OBSTRUCTIVE SLEEP APNEA): ICD-10-CM

## 2024-05-09 DIAGNOSIS — J84.10 PULMONARY FIBROSIS: Primary | ICD-10-CM

## 2024-05-09 DIAGNOSIS — J44.9 CHRONIC OBSTRUCTIVE PULMONARY DISEASE, UNSPECIFIED COPD TYPE: ICD-10-CM

## 2024-05-09 DIAGNOSIS — E66.9 CLASS 2 OBESITY: ICD-10-CM

## 2024-06-10 ENCOUNTER — OFFICE VISIT (OUTPATIENT)
Dept: PODIATRY | Facility: CLINIC | Age: 79
End: 2024-06-10
Payer: MEDICARE

## 2024-06-10 VITALS
HEIGHT: 71 IN | SYSTOLIC BLOOD PRESSURE: 167 MMHG | TEMPERATURE: 98.4 F | DIASTOLIC BLOOD PRESSURE: 95 MMHG | BODY MASS INDEX: 36.4 KG/M2 | HEART RATE: 99 BPM | WEIGHT: 260 LBS | OXYGEN SATURATION: 96 %

## 2024-06-10 DIAGNOSIS — B35.1 ONYCHOMYCOSIS: ICD-10-CM

## 2024-06-10 DIAGNOSIS — M79.672 FOOT PAIN, BILATERAL: ICD-10-CM

## 2024-06-10 DIAGNOSIS — E11.8 DIABETIC FOOT: Primary | ICD-10-CM

## 2024-06-10 DIAGNOSIS — M79.671 FOOT PAIN, BILATERAL: ICD-10-CM

## 2024-06-10 DIAGNOSIS — E11.9 NON-INSULIN DEPENDENT TYPE 2 DIABETES MELLITUS: ICD-10-CM

## 2024-06-10 DIAGNOSIS — L60.0 ONYCHOCRYPTOSIS: ICD-10-CM

## 2024-06-10 DIAGNOSIS — G62.9 NEUROPATHY: ICD-10-CM

## 2024-06-10 PROCEDURE — 1160F RVW MEDS BY RX/DR IN RCRD: CPT | Performed by: PODIATRIST

## 2024-06-10 PROCEDURE — 3077F SYST BP >= 140 MM HG: CPT | Performed by: PODIATRIST

## 2024-06-10 PROCEDURE — 1159F MED LIST DOCD IN RCRD: CPT | Performed by: PODIATRIST

## 2024-06-10 PROCEDURE — 3080F DIAST BP >= 90 MM HG: CPT | Performed by: PODIATRIST

## 2024-06-10 PROCEDURE — 11721 DEBRIDE NAIL 6 OR MORE: CPT | Performed by: PODIATRIST

## 2024-06-10 NOTE — PROGRESS NOTES
[facility] - PODIATRY    Today's Date: 06/10/24    Patient Name: Abdi Gomez  MRN: [id number]  CSN: [id number]  PCP: Jaimie Stallworth APRN, Last PCP Visit: 2024  Referring Provider: No ref. provider found    SUBJECTIVE     Chief Complaint   Patient presents with    Left Foot - Follow-up, Nail Problem    Right Foot - Follow-up, Nail Problem     HPI: Abdi Gomez, a 79 y.o.male, presents to clinic for painful toenail:    New, Established, New Problem:  est  Location:  Toenails  Duration:   Greater than five years  Onset:  Gradual  Nature:  sore with palpation.  Stable, worsening, improving:   stable  Aggravating factors:  Pain with shoe gear and ambulation.  Previous Treatment: debridement    Patient controlling diabetes via: Oral medication    Medical changes:  no changes    Patient states their most recent blood glucose readin    Patient denies any fevers, chills, nausea, vomiting, shortness of breath, nor any other constitutional signs nor symptoms.    Relates numbness in his first toes bilaterally.    I have reviewed/confirmed previously documented HPI with no changes.       Past Medical History:   Diagnosis Date    Asthma     COPD (chronic obstructive pulmonary disease)     Coronary artery disease     Coronary artery disease involving native coronary artery of native heart without angina pectoris 2021     Status post angioplasty and stent placement in  (details not available)    Diabetes mellitus     Difficulty walking     Gout     Hyperlipidemia     Hypertension     Pneumonia 2019    Primary central sleep apnea     Sleep apnea     Sleep apnea, obstructive      Past Surgical History:   Procedure Laterality Date    CARDIAC CATHETERIZATION      CORONARY STENT PLACEMENT       Family History   Problem Relation Age of Onset    Heart failure Mother     Cancer Father         Passed with lung cancer     Social History     Socioeconomic History     Marital status:    Tobacco Use    Smoking status: Former     Current packs/day: 0.00     Average packs/day: 1.5 packs/day for 40.0 years (60.0 ttl pk-yrs)     Types: Cigarettes     Start date: 1963     Quit date: 1999     Years since quittin.4     Passive exposure: Past    Smokeless tobacco: Never   Vaping Use    Vaping status: Never Used   Substance and Sexual Activity    Alcohol use: Never    Drug use: Never    Sexual activity: Not Currently     Partners: Female     Allergies   Allergen Reactions    Atorvastatin Other (See Comments)    Hydrocodone Irritability    Keflex [Cephalexin] Rash     Current Outpatient Medications   Medication Sig Dispense Refill    allopurinol (ZYLOPRIM) 100 MG tablet Take 1 tablet by mouth Daily.      aspirin 81 MG chewable tablet Chew 1 tablet Daily.      cetirizine (zyrTEC) 10 MG tablet Take 1 tablet by mouth Daily.      dilTIAZem CD (CARDIZEM CD) 120 MG 24 hr capsule Take 1 capsule by mouth Daily. 90 capsule 3    ezetimibe (ZETIA) 10 MG tablet Take 1 tablet by mouth Daily.      famotidine (PEPCID) 20 MG tablet Take 1 tablet by mouth 2 (Two) Times a Day.      fluticasone (FLONASE) 50 MCG/ACT nasal spray INSTILL 2 PUFFS INTO EACH NOSTRIL ONCE DAILY      glipizide (GLUCOTROL XL) 2.5 MG 24 hr tablet Take 1 tablet by mouth 2 (Two) Times a Day.      hydroCHLOROthiazide (MICROZIDE) 12.5 MG capsule Take 1 capsule by mouth Every Morning.      hydroxychloroquine (PLAQUENIL) 200 MG tablet Take 1 tablet by mouth 2 (Two) Times a Day.      losartan (Cozaar) 100 MG tablet Take 1 tablet by mouth Daily. 90 tablet 3    Misc Natural Products (BLACK CHERRY CONCENTRATE PO) Take  by mouth Daily.      montelukast (SINGULAIR) 10 MG tablet Take 1 tablet by mouth Every Night.      Nintedanib Esylate (Ofev) 150 MG capsule Take 150 mg by mouth 2 (Two) Times a Day. 60 capsule 5    Omega-3 Fatty Acids (fish oil) 1200 MG capsule capsule Take 1 capsule by mouth Daily.      Red Yeast Rice Extract  "600 MG capsule Take  by mouth Daily.      vitamin B-12 (CYANOCOBALAMIN) 1000 MCG tablet Take 1 tablet by mouth Daily.      VITAMIN D, CHOLECALCIFEROL, PO Take  by mouth Daily.      Fluticasone-Salmeterol (Wixela Inhub) 250-50 MCG/ACT DISKUS Inhale 1 puff 2 (Two) Times a Day for 90 days. 3 each 3    ipratropium-albuterol (DUO-NEB) 0.5-2.5 mg/3 ml nebulizer Take 3 mL by nebulization 4 (Four) Times a Day As Needed for Wheezing for up to 90 days. 360 mL 3    tiotropium bromide monohydrate (Spiriva Respimat) 2.5 MCG/ACT aerosol solution inhaler Inhale 2 puffs Daily for 90 days. 3 each 3     No current facility-administered medications for this visit.     Review of Systems   Constitutional: Negative.    Skin:         Painful toenails   Neurological:  Positive for numbness.   All other systems reviewed and are negative.      OBJECTIVE     Vitals:    06/10/24 1018   BP: 167/95   Pulse: 99   Temp: 98.4 °F (36.9 °C)   SpO2: 96%       Body mass index is 36.26 kg/m².    No results found for: \"HGBA1C\"    Lab Results   Component Value Date    GLUCOSE 65 2024    CALCIUM 9.9 2024     2024    K 4.4 2024    CO2 27.0 2024     2024    BUN 13 2024    CREATININE 1.33 (H) 2024    EGFRIFNONA 60 (L) 2021    BCR 9.8 2024    ANIONGAP 11.0 2024       Patient seen in no apparent distress.      PHYSICAL EXAM:     Foot/Ankle Exam    GENERAL  Appearance:  obese and elderly  Orientation:  AAOx3  Affect:  appropriate  Gait:  unimpaired  Assistance:  independent  Right shoe gear: casual shoe  Left shoe gear: casual shoe    VASCULAR     Right Foot Vascularity   Dorsalis pedis:  2+  Posterior tibial:  2+  Skin temperature:  warm  Edema gradin+ and non-pitting  CFT:  < 3 seconds  Pedal hair growth:  Absent  Varicosities:  mild varicosities     Left Foot Vascularity   Dorsalis pedis:  2+  Posterior tibial:  2+  Skin temperature:  warm  Edema gradin+ and " non-pitting  CFT:  < 3 seconds  Pedal hair growth:  Absent  Varicosities:  mild varicosities     NEUROLOGIC     Right Foot Neurologic   Light touch sensation: diminished  Vibratory sensation: diminished  Hot/Cold sensation: diminished  Protective Sensation using [place]-Shun Monofilament:   Sites intact: 2  Sites tested: 10     Left Foot Neurologic   Light touch sensation: diminished  Vibratory sensation: diminished  Hot/Cold sensation:  diminished  Protective Sensation using [place]un Monofilament:   Sites intact: 2  Sites tested: 10    MUSCLE STRENGTH     Right Foot Muscle Strength   Foot dorsiflexion:  4  Foot plantar flexion:  4  Foot inversion:  4  Foot eversion:  4     Left Foot Muscle Strength   Foot dorsiflexion:  4  Foot plantar flexion:  4  Foot inversion:  4  Foot eversion:  4    RANGE OF MOTION     Right Foot Range of Motion   Foot and ankle ROM within normal limits       Left Foot Range of Motion   Foot and ankle ROM within normal limits      DERMATOLOGIC      Right Foot Dermatologic   Skin  Right foot skin is intact.   Nails  1.  Positive for elongated, onychomycosis, abnormal thickness, subungual debris and ingrown toenail.  2.  Positive for elongated, onychomycosis, abnormal thickness, subungual debris and ingrown toenail.     Left Foot Dermatologic   Skin  Left foot skin is intact.   Nails  1.  Positive for elongated, onychomycosis, abnormal thickness, subungual debris and ingrown toenail.  2.  Positive for elongated, onychomycosis, abnormal thickness, subungual debris and ingrown toenail.  3.  Positive for elongated, onychomycosis, abnormal thickness, subungual debris and ingrown toenail.  4.  Positive for elongated, onychomycosis, abnormally thick, subungual debris and ingrown toenail.    I have reexamined the patient the results are consistent with the previously documented exam.      ASSESSMENT/PLAN     Diagnoses and all orders for this visit:    1. Diabetic foot (Primary)    2.  Neuropathy    3. Foot pain, bilateral    4. Onychocryptosis    5. Onychomycosis    6. Non-insulin dependent type 2 diabetes mellitus    Comprehensive lower extremity examination and evaluation was performed.    Discussed findings and treatment plan including risks, benefits, and treatment options with patient in detail. Patient agreed with treatment plan.    Medications and allergies reviewed.  Reviewed available blood glucose and HgB A1C lab values along with other pertinent labs.  These were discussed with the patient as to their importance of diabetic maintenance.    Toenails 1, 2 on Right and 1, 2, 3, 4 on Left were debrided with nail nippers then filed with a [place] nail doris.  Patient tolerated procedure well without complications.    An After Visit Summary was printed and given to the patient at discharge, including (if requested) any available informative/educational handouts regarding diagnosis, treatment, or medications. All questions were answered to patient/family satisfaction. Should symptoms fail to improve or worsen they agree to call or return to clinic or to go to the Emergency Department. Discussed the importance of following up with any needed screening tests/labs/specialist appointments and any requested follow-up recommended by me today. Importance of maintaining follow-up discussed and patient accepts that missed appointments can delay diagnosis and potentially lead to worsening of conditions.    Return in about 9 weeks (around 8/12/2024) for Toenail Care., or sooner if acute issues arise.    I have reviewed the assessment and plan and verified the accuracy of it. No changes to assessment and plan since the information was documented. Richie Watson, SABINE 06/10/24     I have dictated this note utilizing Dragon Dictation.  Please note that portions of this note were completed with a voice recognition program.  Part of this note may be an electronic transcription/translation of spoken  language to printed text using the Dragon Dictation System.      This document has been electronically signed by Richie Watson DPM on Brook 10, 2024 10:27 EDT

## 2024-07-11 ENCOUNTER — PROCEDURE VISIT (OUTPATIENT)
Dept: OTOLARYNGOLOGY | Facility: CLINIC | Age: 79
End: 2024-07-11
Payer: MEDICARE

## 2024-07-11 ENCOUNTER — OFFICE VISIT (OUTPATIENT)
Dept: OTOLARYNGOLOGY | Facility: CLINIC | Age: 79
End: 2024-07-11
Payer: MEDICARE

## 2024-07-11 VITALS — TEMPERATURE: 97.5 F | BODY MASS INDEX: 37.13 KG/M2 | WEIGHT: 265.2 LBS | HEIGHT: 71 IN

## 2024-07-11 DIAGNOSIS — Z87.891 HISTORY OF TOBACCO ABUSE: ICD-10-CM

## 2024-07-11 DIAGNOSIS — J30.9 ALLERGIC RHINITIS, UNSPECIFIED SEASONALITY, UNSPECIFIED TRIGGER: ICD-10-CM

## 2024-07-11 DIAGNOSIS — G47.33 OSA (OBSTRUCTIVE SLEEP APNEA): ICD-10-CM

## 2024-07-11 DIAGNOSIS — H90.3 SENSORINEURAL HEARING LOSS, BILATERAL: ICD-10-CM

## 2024-07-11 DIAGNOSIS — H90.A22 SENSORINEURAL HEARING LOSS (SNHL) OF LEFT EAR WITH RESTRICTED HEARING OF RIGHT EAR: ICD-10-CM

## 2024-07-11 DIAGNOSIS — H90.A32 MIXED CONDUCTIVE AND SENSORINEURAL HEARING LOSS OF LEFT EAR WITH RESTRICTED HEARING OF RIGHT EAR: ICD-10-CM

## 2024-07-11 DIAGNOSIS — H90.A21 SENSORINEURAL HEARING LOSS (SNHL) OF RIGHT EAR WITH RESTRICTED HEARING OF LEFT EAR: Primary | ICD-10-CM

## 2024-07-11 DIAGNOSIS — D17.0 LIPOMA OF NECK: ICD-10-CM

## 2024-07-11 DIAGNOSIS — J32.9 CHRONIC SINUSITIS, UNSPECIFIED LOCATION: ICD-10-CM

## 2024-07-11 DIAGNOSIS — J33.8 NASAL SINUS POLYP: Primary | ICD-10-CM

## 2024-07-11 DIAGNOSIS — H90.A21 SENSORINEURAL HEARING LOSS (SNHL) OF RIGHT EAR WITH RESTRICTED HEARING OF LEFT EAR: ICD-10-CM

## 2024-07-11 NOTE — PROGRESS NOTES
AUDIOMETRIC EVALUATION      Name:  Abdi Gomez  :  1945  Age:  79 y.o.  Date of Evaluation:  2024       History:  Mr. Gomez is seen today for a hearing evaluation due to hearing loss.    Audiologic Information:  Concerns for Hearing: Yes  PETs: No  Other otologic surgical history: None  Aural Pressure/Fullness: No  Otalgia: No  Otorrhea: None  Tinnitus: Yes  Dizziness: No  Noise Exposure: Yes  Family history of hearing loss: No  Head trauma requiring hospital stay: None  Chemotherapy: No  Other significant history: None    EVALUATION:    See audiogram    RESULTS:    Otoscopic Evaluation:        NOTE: Testing completed after ears were examined by Dr. Hopper.    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  Patient was counseled with regard to the findings.    Amplification needs:  Patient could benefit from amplification if they feel increased communication difficulties.    RECOMMENDATIONS/PLAN:  Follow-up recommendations of Dr. Hopper.  Hearing aid evaluation and counseling upon medical clearance and patient motivation. Patient provided contact information for an audiologist in [facility].  Discussed results and recommendations with patient. Questions were addressed and the patient was encouraged to contact our department should concerns arise.          Quinn Martinez M.S, [facility]  Licensed Audiologist

## 2024-07-11 NOTE — PROGRESS NOTES
Patient Name: Abdi Gomez   Visit Date: 07/11/2024   Patient ID: [id number]  Provider: Joaquin Hopper MD    Sex: male  Location: [facility]   YOB: 1945  Location Address: [address] [place] [address], [address] [place] [address],?[place]?[id number]    Primary Care Provider Federica Jaimie MullinsAPARNA be  Location Phone: [phone]    Referring Provider: No ref. provider found        Chief Complaint  6 month nasal endo    History of Present Illness  Abdi Gomez is a 79 y.o. male who presents to [facility] for 6 month nasal endo. Patient has history of chronic eustachian tube dysfunction which she underwent bilateral PE tube placements in July 23, 2019.. Patient also has obstructive sleep apnea which she is on CPAP. Patient has allergic rhinitis which she had been retested and had been on immunotherapy in the past. 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 He had sinus polyps that were removed and last rigid nasal endoscopy on May 25, 2023 revealed no evidence of any recurrent disease. He also has a large neck lipoma which was excised with no evidence of any recurrent disease at this time. Patient is here for follow-up on all of those with no complaints.   Last visit, rigid nasal endoscopy examination shows no evidence of any sinus recurrent polyposis.  Patient obstructive sleep apnea stable with CPAP.  Patient has underlying sensorineural hearing loss but seems to be unremarkable at this point.  Will obtain audiogram at later time.  There is no evidence of recurrent lipoma on examination of the neck.  Chronic sinusitis is stable at this time  Despite history of tobacco abuse patient is not using any tobacco products at this time.  Patient is here for follow-up 6 months for another rigid nasal endoscopy  Patient is continuing on Singulair, Zyrtec, and Flonase.  Patient  has restarted allergy immunotherapy with family allergy and asthma.    Past Medical History:   Diagnosis Date    Asthma     COPD (chronic obstructive pulmonary disease)     Coronary artery disease     Coronary artery disease involving native coronary artery of native heart without angina pectoris 11/29/2021     Status post angioplasty and stent placement in 1999 (details not available)    Diabetes mellitus     Difficulty walking 2022    GERD (gastroesophageal reflux disease)     Gout 2022    HL (hearing loss)     Hyperlipidemia     Hypertension     Pneumonia 2019    Primary central sleep apnea     Sleep apnea     Sleep apnea, obstructive        Past Surgical History:   Procedure Laterality Date    CARDIAC CATHETERIZATION      CORONARY STENT PLACEMENT      EYE SURGERY      SINUS SURGERY      TONSILLECTOMY           Current Outpatient Medications:     allopurinol (ZYLOPRIM) 100 MG tablet, Take 1 tablet by mouth Daily., Disp: , Rfl:     aspirin 81 MG chewable tablet, Chew 1 tablet Daily., Disp: , Rfl:     cetirizine (zyrTEC) 10 MG tablet, Take 1 tablet by mouth Daily., Disp: , Rfl:     dilTIAZem CD (CARDIZEM CD) 120 MG 24 hr capsule, Take 1 capsule by mouth Daily., Disp: 90 capsule, Rfl: 3    ezetimibe (ZETIA) 10 MG tablet, Take 1 tablet by mouth Daily., Disp: , Rfl:     famotidine (PEPCID) 20 MG tablet, Take 1 tablet by mouth 2 (Two) Times a Day., Disp: , Rfl:     fluticasone (FLONASE) 50 MCG/ACT nasal spray, INSTILL 2 PUFFS INTO EACH NOSTRIL ONCE DAILY, Disp: , Rfl:     glipizide (GLUCOTROL XL) 2.5 MG 24 hr tablet, Take 1 tablet by mouth 2 (Two) Times a Day., Disp: , Rfl:     hydroCHLOROthiazide (MICROZIDE) 12.5 MG capsule, Take 1 capsule by mouth Every Morning., Disp: , Rfl:     hydroxychloroquine (PLAQUENIL) 200 MG tablet, Take 1 tablet by mouth 2 (Two) Times a Day., Disp: , Rfl:     losartan (Cozaar) 100 MG tablet, Take 1 tablet by mouth Daily., Disp: 90 tablet, Rfl: 3    Misc Natural Products (BLACK CHERRY  "CONCENTRATE PO), Take  by mouth Daily., Disp: , Rfl:     montelukast (SINGULAIR) 10 MG tablet, Take 1 tablet by mouth Every Night., Disp: , Rfl:     Nintedanib Esylate (Ofev) 150 MG capsule, Take 150 mg by mouth 2 (Two) Times a Day., Disp: 60 capsule, Rfl: 5    Omega-3 Fatty Acids (fish oil) 1200 MG capsule capsule, Take 1 capsule by mouth Daily., Disp: , Rfl:     Red Yeast Rice Extract 600 MG capsule, Take  by mouth Daily., Disp: , Rfl:     vitamin B-12 (CYANOCOBALAMIN) 1000 MCG tablet, Take 1 tablet by mouth Daily., Disp: , Rfl:     VITAMIN D, CHOLECALCIFEROL, PO, Take  by mouth Daily., Disp: , Rfl:     Fluticasone-Salmeterol (Wixela Inhub) 250-50 MCG/ACT DISKUS, Inhale 1 puff 2 (Two) Times a Day for 90 days., Disp: 3 each, Rfl: 3    ipratropium-albuterol (DUO-NEB) 0.5-2.5 mg/3 ml nebulizer, Take 3 mL by nebulization 4 (Four) Times a Day As Needed for Wheezing for up to 90 days., Disp: 360 mL, Rfl: 3    tiotropium bromide monohydrate (Spiriva Respimat) 2.5 MCG/ACT aerosol solution inhaler, Inhale 2 puffs Daily for 90 days., Disp: 3 each, Rfl: 3     Allergies   Allergen Reactions    Atorvastatin Other (See Comments)    Hydrocodone Irritability    Keflex [Cephalexin] Rash       Social History     Tobacco Use    Smoking status: Former     Current packs/day: 0.00     Average packs/day: 1.5 packs/day for 40.0 years (60.0 ttl pk-yrs)     Types: Cigarettes     Start date: 1963     Quit date: 1999     Years since quittin.5     Passive exposure: Past    Smokeless tobacco: Never   Vaping Use    Vaping status: Never Used   Substance Use Topics    Alcohol use: Never    Drug use: Never        Objective     Vital Signs:   Vitals:    24 0943   Temp: 97.5 °F (36.4 °C)   TempSrc: Temporal   Weight: 120 kg (265 lb 3.2 oz)   Height: 180.3 cm (71\")       Tobacco Use: Medium Risk (2024)    Patient History     Smoking Tobacco Use: Former     Smokeless Tobacco Use: Never     Passive Exposure: Past "         Physical Exam    Constitutional   Appearance  well developed, well-nourished, alert and in no acute distress, voice clear and strong    Head   Inspection  no deformities or lesions      Face   Inspection  no facial lesions; House-Brackmann I/VI bilaterally   Palpation  no TMJ crepitus nor  muscle tenderness bilaterally     Eyes/Vision   Visual Fields  extraocular movements are intact, no spontaneous or gaze-induced nystagmus  Conjunctivae  clear   Sclerae  clear   Pupils and Irises  pupils equal, round, and reactive to light.   Nystagmus  not present     Ears, Nose, Mouth and Throat  Ears  External Ears  appearance within normal limits, no lesions present   Otoscopic Examination  tympanic membrane appearance within normal limits bilaterally without perforations, well-aerated middle ears   Hearing  intact to conversational voice both ears   Tunning fork testing    Rinne:  Storey:    Nose  External Nose  appearance normal   Intranasal Exam  mucosa within normal limits, vestibules normal, no intranasal lesions present, septum midline, sinuses non tender to percussion   Modified Sublette Test:    Oral Cavity  Oral Mucosa  oral mucosa normal without pallor or cyanosis   Lips  lip appearance normal   Teeth  normal dentition for age   Gums  gums pink, non-swollen, no bleeding present   Tongue  tongue appearance normal; normal mobility   Palate  hard palate normal, soft palate appearance normal with symmetric mobility     Throat  Oropharynx  no inflammation or lesions present, tonsils within normal limits   Hypopharynx  appearance within normal limits   Larynx  voice normal     Neck  Inspection/Palpation  normal appearance, no masses or tenderness, trachea midline; thyroid size normal, nontender, no nodules or masses present on palpation     Lymphatic  Neck  no lymphadenopathy present   Supraclavicular Nodes  no lymphadenopathy present   Preauricular Nodes  no lymphadenopathy present      Respiratory  Respiratory Effort  breathing unlabored   Inspection of Chest  normal appearance, no retractions     Musculoskeletal   Cervical back: Normal range of motion and neck supple.      Skin and Subcutaneous Tissue  General Inspection  Regarding face and neck - there are no rashes present, no lesions present, and no areas of discoloration     Neurologic  Cranial Nerves  cranial nerves II-XII are grossly intact bilaterally   Gait and Station  normal gait, able to stand without diffculty    Psychiatric  Judgement and Insight  judgment and insight intact   Mood and Affect  mood normal, affect appropriate       RESULTS REVIEWED    I have reviewed the following information:   [x]  Previous Internal Note  []  Previous External Note:   [x]  Ordered Tests & Results:      Pathology:      PTH, Intact   Date Value Ref Range Status   09/01/2023 37.1 15.0 - 65.0 pg/mL Final     Calcium   Date Value Ref Range Status   02/06/2024 9.9 8.6 - 10.5 mg/dL Final       No Images in the past 120 days found..    I have discussed the interpretation of the above results with the patient.    Nasal endoscopy    Date/Time: 7/11/2024 10:27 AM    Performed by: Joaquin Hopper MD  Authorized by: Joaquin Hopper MD    Consent:     Consent obtained:  Verbal    Consent given by:  Patient    Risks discussed:  Bleeding and pain    Alternatives discussed:  No treatment and delayed treatment  Procedure details:     Medication:  Afrin    Instrument: rigid nasal endoscopy      Scope location: bilateral nare    Post-procedure details:     Patient tolerance of procedure:  Tolerated well  Comments:      After topical anesthetic and decongestant application, rigid nasal endoscopy was performed in both nostrils.  Septum remains midline with excellent nasal airway.  Each ethmoid cavities were examined and there is no evidence of recurrent polyps.  All the sinuses including nasofrontal duct maxillary and sphenoid sinuses are all open bilaterally with no  evidence of disease present.  Excellent nasal airway is noted bilaterally.  Patient tolerated procedure well.            Assessment and Plan   Diagnoses and all orders for this visit:    1. Nasal sinus polyp (Primary)    2. FILOMENA (obstructive sleep apnea)    3. Sensorineural hearing loss (SNHL) of right ear with restricted hearing of left ear  -     Comprehensive Hearing Test; Future    4. Chronic sinusitis, unspecified location    5. Lipoma of neck    6. Mixed conductive and sensorineural hearing loss of left ear with restricted hearing of right ear    7. Allergic rhinitis, unspecified seasonality, unspecified trigger    8. History of tobacco abuse    9. Sensorineural hearing loss, bilateral  -     Comprehensive Hearing Test; Future    Other orders  -     $ Nasal / Sinus Endoscopy        Abdi Gomez  reports that he quit smoking about 25 years ago. His smoking use included cigarettes. He started smoking about 61 years ago. He has a 60 pack-year smoking history. He has been exposed to tobacco smoke. He has never used smokeless tobacco.       Plan:  Patient Instructions   1.  Audiogram obtained today shows SRT of 45 bilaterally and discrimination score of 87% on the right and 80% on the left.  Otherwise tympanograms are type a bilaterally.  Pure-tone show right mild to profound sensorineural hearing loss in the left ear with mild to profound mixed hearing loss.  [facility] but he needs to go and get it replaced.  [facility] [place] and also obtain a remote microphone and using Bluetooth on his smart phone can get it activated.  2.  Rigid nasal endoscopy shows no evidence of any recurrent polyps.  3.  Patient is still using CPAP for his obstructive sleep apnea.  4.  Patient has no recurrence of lipoma in the neck.  5.  Regarding allergies patient is still on shots at family allergy asthma.  Along with also medications including Singulair, Flonase, and Zyrtec.  6.   I will see patient in 6 months again for repeat exam.      Follow Up   Return in about 6 months (around 1/11/2025), or For 6 months with rigid nasal endoscopy..  Patient was given instructions and counseling regarding his condition or for health maintenance advice. Please see specific information pulled into the AVS if appropriate.

## 2024-07-11 NOTE — PATIENT INSTRUCTIONS
1.  Audiogram obtained today shows SRT of 45 bilaterally and discrimination score of 87% on the right and 80% on the left.  Otherwise tympanograms are type a bilaterally.  Pure-tone show right mild to profound sensorineural hearing loss in the left ear with mild to profound mixed hearing loss.  [facility] but he needs to go and get it replaced.  [facility] [place] and also obtain a remote microphone and using Bluetooth on his smart phone can get it activated.  2.  Rigid nasal endoscopy shows no evidence of any recurrent polyps.  3.  Patient is still using CPAP for his obstructive sleep apnea.  4.  Patient has no recurrence of lipoma in the neck.  5.  Regarding allergies patient is still on shots at family allergy asthma.  Along with also medications including Singulair, Flonase, and Zyrtec.  6.  I will see patient in 6 months again for repeat exam.

## 2024-08-26 ENCOUNTER — TRANSCRIBE ORDERS (OUTPATIENT)
Dept: LAB | Facility: HOSPITAL | Age: 79
End: 2024-08-26
Payer: MEDICARE

## 2024-08-26 ENCOUNTER — LAB (OUTPATIENT)
Dept: LAB | Facility: HOSPITAL | Age: 79
End: 2024-08-26
Payer: MEDICARE

## 2024-08-26 DIAGNOSIS — N18.31 CHRONIC KIDNEY DISEASE (CKD) STAGE G3A/A1, MODERATELY DECREASED GLOMERULAR FILTRATION RATE (GFR) BETWEEN 45-59 ML/MIN/1.73 SQUARE METER AND ALBUMINURIA CREATININE RATIO LESS THAN 30 MG/G (CMS/H*: Primary | ICD-10-CM

## 2024-08-26 DIAGNOSIS — N18.31 CHRONIC KIDNEY DISEASE (CKD) STAGE G3A/A1, MODERATELY DECREASED GLOMERULAR FILTRATION RATE (GFR) BETWEEN 45-59 ML/MIN/1.73 SQUARE METER AND ALBUMINURIA CREATININE RATIO LESS THAN 30 MG/G (CMS/H*: ICD-10-CM

## 2024-08-26 LAB
ANION GAP SERPL CALCULATED.3IONS-SCNC: 10.5 MMOL/L (ref 5–15)
BACTERIA UR QL AUTO: NORMAL /HPF
BASOPHILS # BLD AUTO: 0.03 10*3/MM3 (ref 0–0.2)
BASOPHILS NFR BLD AUTO: 0.4 % (ref 0–1.5)
BILIRUB UR QL STRIP: NEGATIVE
BUN SERPL-MCNC: 12 MG/DL (ref 8–23)
BUN/CREAT SERPL: 8.8 (ref 7–25)
CALCIUM SPEC-SCNC: 10 MG/DL (ref 8.6–10.5)
CHLORIDE SERPL-SCNC: 103 MMOL/L (ref 98–107)
CLARITY UR: CLEAR
CO2 SERPL-SCNC: 27.5 MMOL/L (ref 22–29)
COLOR UR: YELLOW
CREAT SERPL-MCNC: 1.37 MG/DL (ref 0.76–1.27)
CREAT UR-MCNC: 83.8 MG/DL
DEPRECATED RDW RBC AUTO: 43.4 FL (ref 37–54)
EGFRCR SERPLBLD CKD-EPI 2021: 53.1 ML/MIN/1.73
EOSINOPHIL # BLD AUTO: 0.41 10*3/MM3 (ref 0–0.4)
EOSINOPHIL NFR BLD AUTO: 5.3 % (ref 0.3–6.2)
ERYTHROCYTE [DISTWIDTH] IN BLOOD BY AUTOMATED COUNT: 13.1 % (ref 12.3–15.4)
GLUCOSE SERPL-MCNC: 125 MG/DL (ref 65–99)
GLUCOSE UR STRIP-MCNC: NEGATIVE MG/DL
HCT VFR BLD AUTO: 43 % (ref 37.5–51)
HGB BLD-MCNC: 14.6 G/DL (ref 13–17.7)
HGB UR QL STRIP.AUTO: NEGATIVE
HYALINE CASTS UR QL AUTO: NORMAL /LPF
IMM GRANULOCYTES # BLD AUTO: 0.04 10*3/MM3 (ref 0–0.05)
IMM GRANULOCYTES NFR BLD AUTO: 0.5 % (ref 0–0.5)
KETONES UR QL STRIP: NEGATIVE
LEUKOCYTE ESTERASE UR QL STRIP.AUTO: NEGATIVE
LYMPHOCYTES # BLD AUTO: 1.89 10*3/MM3 (ref 0.7–3.1)
LYMPHOCYTES NFR BLD AUTO: 24.4 % (ref 19.6–45.3)
MCH RBC QN AUTO: 31 PG (ref 26.6–33)
MCHC RBC AUTO-ENTMCNC: 34 G/DL (ref 31.5–35.7)
MCV RBC AUTO: 91.3 FL (ref 79–97)
MONOCYTES # BLD AUTO: 0.46 10*3/MM3 (ref 0.1–0.9)
MONOCYTES NFR BLD AUTO: 5.9 % (ref 5–12)
NEUTROPHILS NFR BLD AUTO: 4.92 10*3/MM3 (ref 1.7–7)
NEUTROPHILS NFR BLD AUTO: 63.5 % (ref 42.7–76)
NITRITE UR QL STRIP: NEGATIVE
NRBC BLD AUTO-RTO: 0 /100 WBC (ref 0–0.2)
PH UR STRIP.AUTO: 6.5 [PH] (ref 5–8)
PHOSPHATE SERPL-MCNC: 3 MG/DL (ref 2.5–4.5)
PLATELET # BLD AUTO: 197 10*3/MM3 (ref 140–450)
PMV BLD AUTO: 10.4 FL (ref 6–12)
POTASSIUM SERPL-SCNC: 4.8 MMOL/L (ref 3.5–5.2)
PROT ?TM UR-MCNC: 7.9 MG/DL
PROT UR QL STRIP: NEGATIVE
PROT/CREAT UR: 0.09 MG/G{CREAT}
PTH-INTACT SERPL-MCNC: 36.6 PG/ML (ref 15–65)
RBC # BLD AUTO: 4.71 10*6/MM3 (ref 4.14–5.8)
RBC # UR STRIP: NORMAL /HPF
REF LAB TEST METHOD: NORMAL
SODIUM SERPL-SCNC: 141 MMOL/L (ref 136–145)
SP GR UR STRIP: 1.01 (ref 1–1.03)
SQUAMOUS #/AREA URNS HPF: NORMAL /HPF
UROBILINOGEN UR QL STRIP: NORMAL
WBC # UR STRIP: NORMAL /HPF
WBC NRBC COR # BLD AUTO: 7.75 10*3/MM3 (ref 3.4–10.8)

## 2024-08-26 PROCEDURE — 84156 ASSAY OF PROTEIN URINE: CPT

## 2024-08-26 PROCEDURE — 80048 BASIC METABOLIC PNL TOTAL CA: CPT

## 2024-08-26 PROCEDURE — 84100 ASSAY OF PHOSPHORUS: CPT

## 2024-08-26 PROCEDURE — 82570 ASSAY OF URINE CREATININE: CPT

## 2024-08-26 PROCEDURE — 36415 COLL VENOUS BLD VENIPUNCTURE: CPT

## 2024-08-26 PROCEDURE — 81001 URINALYSIS AUTO W/SCOPE: CPT

## 2024-08-26 PROCEDURE — 83970 ASSAY OF PARATHORMONE: CPT

## 2024-08-26 PROCEDURE — 85025 COMPLETE CBC W/AUTO DIFF WBC: CPT

## 2024-09-04 ENCOUNTER — OFFICE VISIT (OUTPATIENT)
Dept: PODIATRY | Facility: CLINIC | Age: 79
End: 2024-09-04
Payer: MEDICARE

## 2024-09-04 VITALS
WEIGHT: 256 LBS | TEMPERATURE: 98.4 F | SYSTOLIC BLOOD PRESSURE: 143 MMHG | DIASTOLIC BLOOD PRESSURE: 86 MMHG | BODY MASS INDEX: 35.84 KG/M2 | HEART RATE: 93 BPM | OXYGEN SATURATION: 94 % | HEIGHT: 71 IN

## 2024-09-04 DIAGNOSIS — E11.8 DIABETIC FOOT: ICD-10-CM

## 2024-09-04 DIAGNOSIS — M79.671 FOOT PAIN, BILATERAL: ICD-10-CM

## 2024-09-04 DIAGNOSIS — G62.9 NEUROPATHY: ICD-10-CM

## 2024-09-04 DIAGNOSIS — M79.672 FOOT PAIN, BILATERAL: ICD-10-CM

## 2024-09-04 DIAGNOSIS — L60.0 ONYCHOCRYPTOSIS: Primary | ICD-10-CM

## 2024-09-04 DIAGNOSIS — B35.1 ONYCHOMYCOSIS: ICD-10-CM

## 2024-09-04 DIAGNOSIS — E11.9 NON-INSULIN DEPENDENT TYPE 2 DIABETES MELLITUS: ICD-10-CM

## 2024-09-04 NOTE — PROGRESS NOTES
[facility] - [facility]    Today's Date: 24    Patient Name: Abdi Gomez  MRN: [id number]  CSN: [id number]  PCP: Jaimie Stallworth APRN, Last PCP Visit: 2024  Referring Provider: No ref. provider found    SUBJECTIVE     Chief Complaint   Patient presents with    Left Foot - Follow-up, Nail Problem    Right Foot - Follow-up, Nail Problem     HPI: Abdi Gomez, a 79 y.o.male, presents to clinic for painful toenail:    New, Established, New Problem:  est  Location:  Toenails  Duration:   Greater than five years  Onset:  Gradual  Nature:  sore with palpation.  Stable, worsening, improving:   stable  Aggravating factors:  Pain with shoe gear and ambulation.  Previous Treatment: debridement    Patient controlling diabetes via: Oral medication    Medical changes:  none    Patient states their most recent blood glucose readin    Patient denies any fevers, chills, nausea, vomiting, shortness of breath, nor any other constitutional signs nor symptoms.    Relates numbness in his first toes bilaterally.    I have reviewed/confirmed previously documented HPI with no changes.       Past Medical History:   Diagnosis Date    Asthma     COPD (chronic obstructive pulmonary disease)     Coronary artery disease     Coronary artery disease involving native coronary artery of native heart without angina pectoris 2021     Status post angioplasty and stent placement in  (details not available)    Diabetes mellitus     Difficulty walking     GERD (gastroesophageal reflux disease)     Gout     HL (hearing loss)     Hyperlipidemia     Hypertension     Pneumonia     Primary central sleep apnea     Sleep apnea     Sleep apnea, obstructive      Past Surgical History:   Procedure Laterality Date    CARDIAC CATHETERIZATION      CORONARY STENT PLACEMENT      EYE SURGERY      SINUS SURGERY      TONSILLECTOMY       Family History   Problem Relation Age of Onset    Heart failure  Mother     Asthma Mother     Cancer Father         Passed with lung cancer     Social History     Socioeconomic History    Marital status:    Tobacco Use    Smoking status: Former     Current packs/day: 0.00     Average packs/day: 1.5 packs/day for 40.0 years (60.0 ttl pk-yrs)     Types: Cigarettes     Start date: 1963     Quit date: 1999     Years since quittin.6     Passive exposure: Past    Smokeless tobacco: Never   Vaping Use    Vaping status: Never Used   Substance and Sexual Activity    Alcohol use: Never    Drug use: Never    Sexual activity: Not Currently     Partners: Female     Allergies   Allergen Reactions    Atorvastatin Other (See Comments)    Hydrocodone Irritability    Keflex [Cephalexin] Rash     Current Outpatient Medications   Medication Sig Dispense Refill    allopurinol (ZYLOPRIM) 100 MG tablet Take 1 tablet by mouth Daily.      aspirin 81 MG chewable tablet Chew 1 tablet Daily.      cetirizine (zyrTEC) 10 MG tablet Take 1 tablet by mouth Daily.      dilTIAZem CD (CARDIZEM CD) 120 MG 24 hr capsule Take 1 capsule by mouth Daily. 90 capsule 3    ezetimibe (ZETIA) 10 MG tablet Take 1 tablet by mouth Daily.      famotidine (PEPCID) 20 MG tablet Take 1 tablet by mouth 2 (Two) Times a Day.      fluticasone (FLONASE) 50 MCG/ACT nasal spray INSTILL 2 PUFFS INTO EACH NOSTRIL ONCE DAILY      Fluticasone-Salmeterol (Wixela Inhub) 250-50 MCG/ACT DISKUS Inhale 1 puff 2 (Two) Times a Day for 90 days. 3 each 3    glipizide (GLUCOTROL XL) 2.5 MG 24 hr tablet Take 1 tablet by mouth 2 (Two) Times a Day.      hydroCHLOROthiazide (MICROZIDE) 12.5 MG capsule Take 1 capsule by mouth Every Morning.      hydroxychloroquine (PLAQUENIL) 200 MG tablet Take 1 tablet by mouth 2 (Two) Times a Day.      ipratropium-albuterol (DUO-NEB) 0.5-2.5 mg/3 ml nebulizer Take 3 mL by nebulization 4 (Four) Times a Day As Needed for Wheezing for up to 90 days. 360 mL 3    losartan (Cozaar) 100 MG tablet Take 1 tablet  "by mouth Daily. 90 tablet 3    Misc Natural Products (BLACK CHERRY CONCENTRATE PO) Take  by mouth Daily.      montelukast (SINGULAIR) 10 MG tablet Take 1 tablet by mouth Every Night.      Nintedanib Esylate (Ofev) 150 MG capsule Take 150 mg by mouth 2 (Two) Times a Day. 60 capsule 5    Omega-3 Fatty Acids (fish oil) 1200 MG capsule capsule Take 1 capsule by mouth Daily.      Red Yeast Rice Extract 600 MG capsule Take  by mouth Daily.      tiotropium bromide monohydrate (Spiriva Respimat) 2.5 MCG/ACT aerosol solution inhaler Inhale 2 puffs Daily for 90 days. 3 each 3    vitamin B-12 (CYANOCOBALAMIN) 1000 MCG tablet Take 1 tablet by mouth Daily.      VITAMIN D, CHOLECALCIFEROL, PO Take  by mouth Daily.       No current facility-administered medications for this visit.     Review of Systems   Constitutional: Negative.    Skin:         Painful toenails   Neurological:  Positive for numbness.   All other systems reviewed and are negative.      OBJECTIVE     Vitals:    24 1055   BP: 143/86   Pulse: 93   Temp: 98.4 °F (36.9 °C)   SpO2: 94%       Body mass index is 35.7 kg/m².    No results found for: \"HGBA1C\"    Lab Results   Component Value Date    GLUCOSE 125 (H) 2024    CALCIUM 10.0 2024     2024    K 4.8 2024    CO2 27.5 2024     2024    BUN 12 2024    CREATININE 1.37 (H) 2024    EGFRIFNONA 60 (L) 2021    BCR 8.8 2024    ANIONGAP 10.5 2024       Patient seen in no apparent distress.      PHYSICAL EXAM:     Foot/Ankle Exam    GENERAL  Appearance:  obese and elderly  Orientation:  AAOx3  Affect:  appropriate  Gait:  unimpaired  Assistance:  independent  Right shoe gear: casual shoe  Left shoe gear: casual shoe    VASCULAR     Right Foot Vascularity   Dorsalis pedis:  2+  Posterior tibial:  2+  Skin temperature:  warm  Edema gradin+ and non-pitting  CFT:  < 3 seconds  Pedal hair growth:  Absent  Varicosities:  mild varicosities     " Left Foot Vascularity   Dorsalis pedis:  2+  Posterior tibial:  2+  Skin temperature:  warm  Edema gradin+ and non-pitting  CFT:  < 3 seconds  Pedal hair growth:  Absent  Varicosities:  mild varicosities     NEUROLOGIC     Right Foot Neurologic   Light touch sensation: diminished  Vibratory sensation: diminished  Hot/Cold sensation: diminished  Protective Sensation using Gilbert-Shun Monofilament:   Sites intact: 2  Sites tested: 10     Left Foot Neurologic   Light touch sensation: diminished  Vibratory sensation: diminished  Hot/Cold sensation:  diminished  Protective Sensation using Gilbert-Shun Monofilament:   Sites intact: 2  Sites tested: 10    MUSCLE STRENGTH     Right Foot Muscle Strength   Foot dorsiflexion:  4  Foot plantar flexion:  4  Foot inversion:  4  Foot eversion:  4     Left Foot Muscle Strength   Foot dorsiflexion:  4  Foot plantar flexion:  4  Foot inversion:  4  Foot eversion:  4    RANGE OF MOTION     Right Foot Range of Motion   Foot and ankle ROM within normal limits       Left Foot Range of Motion   Foot and ankle ROM within normal limits      DERMATOLOGIC      Right Foot Dermatologic   Skin  Right foot skin is intact.   Nails  1.  Positive for elongated, onychomycosis, abnormal thickness, subungual debris and ingrown toenail.  2.  Positive for elongated, onychomycosis, abnormal thickness, subungual debris and ingrown toenail.     Left Foot Dermatologic   Skin  Left foot skin is intact.   Nails  1.  Positive for elongated, onychomycosis, abnormal thickness, subungual debris and ingrown toenail.  2.  Positive for elongated, onychomycosis, abnormal thickness, subungual debris and ingrown toenail.  3.  Positive for elongated, onychomycosis, abnormal thickness, subungual debris and ingrown toenail.  4.  Positive for elongated, onychomycosis, abnormally thick, subungual debris and ingrown toenail.    I have reexamined the patient the results are consistent with the previously  documented exam.      ASSESSMENT/PLAN     Diagnoses and all orders for this visit:    1. Onychocryptosis (Primary)    2. Foot pain, bilateral    3. Neuropathy    4. Diabetic foot    5. Onychomycosis    6. Non-insulin dependent type 2 diabetes mellitus    Comprehensive lower extremity examination and evaluation was performed.    Discussed findings and treatment plan including risks, benefits, and treatment options with patient in detail. Patient agreed with treatment plan.    Medications and allergies reviewed.  Reviewed available blood glucose and HgB A1C lab values along with other pertinent labs.  These were discussed with the patient as to their importance of diabetic maintenance.    Toenails 1, 2 on Right and 1, 2, 3, 4 on Left were debrided with nail nippers then filed with a Dremel nail doris.  Patient tolerated procedure well without complications.    An After Visit Summary was printed and given to the patient at discharge, including (if requested) any available informative/educational handouts regarding diagnosis, treatment, or medications. All questions were answered to patient/family satisfaction. Should symptoms fail to improve or worsen they agree to call or return to clinic or to go to the Emergency Department. Discussed the importance of following up with any needed screening tests/labs/specialist appointments and any requested follow-up recommended by me today. Importance of maintaining follow-up discussed and patient accepts that missed appointments can delay diagnosis and potentially lead to worsening of conditions.    Return in about 9 weeks (around 11/6/2024) for Toenail Care., or sooner if acute issues arise.    I have reviewed the assessment and plan and verified the accuracy of it. No changes to assessment and plan since the information was documented. Richie Watson DPM 09/04/24     I have dictated this note utilizing Dragon Dictation.  Please note that portions of this note were  completed with a voice recognition program.  Part of this note may be an electronic transcription/translation of spoken language to printed text using the Dragon Dictation System.      This document has been electronically signed by Richie Watson DPM on September 4, 2024 11:18 EDT

## 2024-09-05 ENCOUNTER — OFFICE VISIT (OUTPATIENT)
Dept: CARDIOLOGY | Facility: CLINIC | Age: 79
End: 2024-09-05
Payer: MEDICARE

## 2024-09-05 VITALS
WEIGHT: 256.2 LBS | BODY MASS INDEX: 35.87 KG/M2 | HEIGHT: 71 IN | SYSTOLIC BLOOD PRESSURE: 140 MMHG | HEART RATE: 100 BPM | DIASTOLIC BLOOD PRESSURE: 80 MMHG

## 2024-09-05 DIAGNOSIS — I10 ESSENTIAL HYPERTENSION: ICD-10-CM

## 2024-09-05 DIAGNOSIS — I25.10 CORONARY ARTERY DISEASE INVOLVING NATIVE CORONARY ARTERY OF NATIVE HEART WITHOUT ANGINA PECTORIS: Primary | ICD-10-CM

## 2024-09-05 DIAGNOSIS — E78.2 MIXED HYPERLIPIDEMIA: ICD-10-CM

## 2024-09-05 PROCEDURE — 99214 OFFICE O/P EST MOD 30 MIN: CPT | Performed by: FAMILY MEDICINE

## 2024-09-05 PROCEDURE — 3077F SYST BP >= 140 MM HG: CPT | Performed by: FAMILY MEDICINE

## 2024-09-05 PROCEDURE — 3079F DIAST BP 80-89 MM HG: CPT | Performed by: FAMILY MEDICINE

## 2024-09-24 ENCOUNTER — TELEPHONE (OUTPATIENT)
Dept: PULMONOLOGY | Facility: CLINIC | Age: 79
End: 2024-09-24

## 2024-09-24 NOTE — TELEPHONE ENCOUNTER
Caller: ALANA ARELLANO    Relationship to patient: Emergency Contact    Best call back number:     [phone] (Home)       Patient is needing: PT IS NEEDING US TO SEND A PRESCRIPTIONS  TO HIS DME COMPANY FOR NEW SUPPLIES     TO [facility]

## 2024-11-21 ENCOUNTER — OFFICE VISIT (OUTPATIENT)
Dept: PODIATRY | Facility: CLINIC | Age: 79
End: 2024-11-21
Payer: MEDICARE

## 2024-11-21 VITALS
TEMPERATURE: 98.4 F | DIASTOLIC BLOOD PRESSURE: 74 MMHG | SYSTOLIC BLOOD PRESSURE: 134 MMHG | WEIGHT: 253 LBS | HEART RATE: 101 BPM | OXYGEN SATURATION: 96 % | BODY MASS INDEX: 35.42 KG/M2 | HEIGHT: 71 IN

## 2024-11-21 DIAGNOSIS — E11.8 DM FEET: ICD-10-CM

## 2024-11-21 DIAGNOSIS — G62.9 NEUROPATHY: Primary | ICD-10-CM

## 2024-11-21 DIAGNOSIS — M79.671 FOOT PAIN, BILATERAL: ICD-10-CM

## 2024-11-21 DIAGNOSIS — M79.672 FOOT PAIN, BILATERAL: ICD-10-CM

## 2024-11-21 DIAGNOSIS — E11.9 NON-INSULIN DEPENDENT TYPE 2 DIABETES MELLITUS: ICD-10-CM

## 2024-11-21 DIAGNOSIS — L60.0 ONYCHOCRYPTOSIS: ICD-10-CM

## 2024-11-21 DIAGNOSIS — B35.1 ONYCHOMYCOSIS: ICD-10-CM

## 2024-11-21 NOTE — PROGRESS NOTES
[facility] - PODIATRY    Today's Date: 24    Patient Name: Abdi Gomez  MRN: [id number]  CSN: [id number]  PCP: Jaimie Stallworth APRN, Last PCP Visit: 2024  Referring Provider: No ref. provider found    SUBJECTIVE     Chief Complaint   Patient presents with    Left Foot - Follow-up, Nail Problem    Right Foot - Follow-up, Nail Problem     HPI: Abdi Gomez, a 79 y.o.male, presents to clinic for painful toenail:    New, Established, New Problem:  est  Location:  Toenails  Duration:   Greater than five years  Onset:  Gradual  Nature:  sore with palpation.  Stable, worsening, improving:   stable  Aggravating factors:  Pain with shoe gear and ambulation.  Previous Treatment: debridement    Patient controlling diabetes via: Oral medication    Medical changes:  no changes    Patient states their most recent blood glucose readin    Patient denies any fevers, chills, nausea, vomiting, shortness of breath, nor any other constitutional signs nor symptoms.    The patient relates numbness in his first toes bilaterally.    I have reviewed/confirmed previously documented HPI with no changes.     Past Medical History:   Diagnosis Date    Asthma     COPD (chronic obstructive pulmonary disease)     Coronary artery disease     Coronary artery disease involving native coronary artery of native heart without angina pectoris 2021     Status post angioplasty and stent placement in  (details not available)    Diabetes mellitus     Difficulty walking     GERD (gastroesophageal reflux disease)     Gout     HL (hearing loss)     Hyperlipidemia     Hypertension     Pneumonia     Primary central sleep apnea     Sleep apnea     Sleep apnea, obstructive      Past Surgical History:   Procedure Laterality Date    CARDIAC CATHETERIZATION      CORONARY STENT PLACEMENT      EYE SURGERY      SINUS SURGERY      TONSILLECTOMY       Family History   Problem Relation Age of Onset     Heart failure Mother     Asthma Mother     Cancer Father         Passed with lung cancer     Social History     Socioeconomic History    Marital status:    Tobacco Use    Smoking status: Former     Current packs/day: 0.00     Average packs/day: 1.5 packs/day for 40.0 years (60.0 ttl pk-yrs)     Types: Cigarettes     Start date: 1963     Quit date: 1999     Years since quittin.9     Passive exposure: Past    Smokeless tobacco: Never   Vaping Use    Vaping status: Never Used   Substance and Sexual Activity    Alcohol use: Never    Drug use: Never    Sexual activity: Not Currently     Partners: Female     Allergies   Allergen Reactions    Atorvastatin Other (See Comments)    Hydrocodone Irritability    Keflex [Cephalexin] Rash     Current Outpatient Medications   Medication Sig Dispense Refill    allopurinol (ZYLOPRIM) 100 MG tablet Take 1 tablet by mouth Daily.      aspirin 81 MG chewable tablet Chew 1 tablet Daily.      cetirizine (zyrTEC) 10 MG tablet Take 1 tablet by mouth Daily.      ezetimibe (ZETIA) 10 MG tablet Take 1 tablet by mouth Daily.      famotidine (PEPCID) 20 MG tablet Take 1 tablet by mouth 2 (Two) Times a Day.      fluticasone (FLONASE) 50 MCG/ACT nasal spray INSTILL 2 PUFFS INTO EACH NOSTRIL ONCE DAILY      Fluticasone-Salmeterol (Wixela Inhub) 250-50 MCG/ACT DISKUS Inhale 1 puff 2 (Two) Times a Day for 90 days. 3 each 3    glipizide (GLUCOTROL XL) 2.5 MG 24 hr tablet Take 1 tablet by mouth 2 (Two) Times a Day.      hydroCHLOROthiazide (MICROZIDE) 12.5 MG capsule Take 1 capsule by mouth Every Morning.      hydroxychloroquine (PLAQUENIL) 200 MG tablet Take 1 tablet by mouth 2 (Two) Times a Day.      ipratropium-albuterol (DUO-NEB) 0.5-2.5 mg/3 ml nebulizer Take 3 mL by nebulization 4 (Four) Times a Day As Needed for Wheezing for up to 90 days. 360 mL 3    losartan (Cozaar) 100 MG tablet Take 1 tablet by mouth Daily. 90 tablet 3    Misc Natural Products (BLACK CHERRY CONCENTRATE  "PO) Take  by mouth Daily.      montelukast (SINGULAIR) 10 MG tablet Take 1 tablet by mouth Every Night.      Nintedanib Esylate (Ofev) 150 MG capsule Take 150 mg by mouth 2 (Two) Times a Day. 60 capsule 5    Omega-3 Fatty Acids (fish oil) 1200 MG capsule capsule Take 1 capsule by mouth Daily.      Red Yeast Rice Extract 600 MG capsule Take  by mouth Daily.      vitamin B-12 (CYANOCOBALAMIN) 1000 MCG tablet Take 1 tablet by mouth Daily.      VITAMIN D, CHOLECALCIFEROL, PO Take  by mouth Daily.      tiotropium bromide monohydrate (Spiriva Respimat) 2.5 MCG/ACT aerosol solution inhaler Inhale 2 puffs Daily for 90 days. 3 each 3     No current facility-administered medications for this visit.     Review of Systems   Constitutional: Negative.    Skin:         Painful toenails   Neurological:  Positive for numbness.   All other systems reviewed and are negative.      OBJECTIVE     Vitals:    24 1034   BP: 134/74   Pulse: 101   Temp: 98.4 °F (36.9 °C)   SpO2: 96%       Body mass index is 35.29 kg/m².    No results found for: \"HGBA1C\"    Lab Results   Component Value Date    GLUCOSE 125 (H) 2024    CALCIUM 10.0 2024     2024    K 4.8 2024    CO2 27.5 2024     2024    BUN 12 2024    CREATININE 1.37 (H) 2024    EGFRIFNONA 60 (L) 2021    BCR 8.8 2024    ANIONGAP 10.5 2024       Patient seen in no apparent distress.      PHYSICAL EXAM:     Foot/Ankle Exam    GENERAL  Appearance:  obese and elderly  Orientation:  AAOx3  Affect:  appropriate  Gait:  unimpaired  Assistance:  independent  Right shoe gear: casual shoe  Left shoe gear: casual shoe    VASCULAR     Right Foot Vascularity   Dorsalis pedis:  2+  Posterior tibial:  2+  Skin temperature:  warm  Edema gradin+ and non-pitting  CFT:  < 3 seconds  Pedal hair growth:  Absent  Varicosities:  mild varicosities     Left Foot Vascularity   Dorsalis pedis:  2+  Posterior tibial:  2+  Skin " temperature:  warm  Edema gradin+ and non-pitting  CFT:  < 3 seconds  Pedal hair growth:  Absent  Varicosities:  mild varicosities     NEUROLOGIC     Right Foot Neurologic   Light touch sensation: diminished  Vibratory sensation: diminished  Hot/Cold sensation: diminished  Protective Sensation using [place]-Shun Monofilament:   Sites intact: 2  Sites tested: 10     Left Foot Neurologic   Light touch sensation: diminished  Vibratory sensation: diminished  Hot/Cold sensation:  diminished  Protective Sensation using [place]-Shun Monofilament:   Sites intact: 2  Sites tested: 10    MUSCLE STRENGTH     Right Foot Muscle Strength   Foot dorsiflexion:  4  Foot plantar flexion:  4  Foot inversion:  4  Foot eversion:  4     Left Foot Muscle Strength   Foot dorsiflexion:  4  Foot plantar flexion:  4  Foot inversion:  4  Foot eversion:  4    RANGE OF MOTION     Right Foot Range of Motion   Foot and ankle ROM within normal limits       Left Foot Range of Motion   Foot and ankle ROM within normal limits      DERMATOLOGIC      Right Foot Dermatologic   Skin  Right foot skin is intact.   Nails  1.  Positive for elongated, onychomycosis, abnormal thickness, subungual debris and ingrown toenail.  2.  Positive for elongated, onychomycosis, abnormal thickness, subungual debris and ingrown toenail.     Left Foot Dermatologic   Skin  Left foot skin is intact.   Nails  1.  Positive for elongated, onychomycosis, abnormal thickness, subungual debris and ingrown toenail.  2.  Positive for elongated, onychomycosis, abnormal thickness, subungual debris and ingrown toenail.  3.  Positive for elongated, onychomycosis, abnormal thickness, subungual debris and ingrown toenail.  4.  Positive for elongated, onychomycosis, abnormally thick, subungual debris and ingrown toenail.    I have reexamined the patient the results are consistent with the previously documented exam.      ASSESSMENT/PLAN     Diagnoses and all orders for this  visit:    1. Neuropathy (Primary)    2. Foot pain, bilateral    3. Non-insulin dependent type 2 diabetes mellitus    4. Onychomycosis    5. Onychocryptosis    6. DM feet    Comprehensive lower extremity examination and evaluation was performed.    Discussed findings and treatment plan including risks, benefits, and treatment options with patient in detail. Patient agreed with treatment plan.    Medications and allergies reviewed.  Reviewed available blood glucose and HgB A1C lab values along with other pertinent labs.  These were discussed with the patient as to their importance of diabetic maintenance.    Toenails 1, 2 on Right and 1, 2, 3, 4 on Left were debrided with nail nippers then filed with a Dremel nail doris.  Patient tolerated procedure well without complications.    An After Visit Summary was printed and given to the patient at discharge, including (if requested) any available informative/educational handouts regarding diagnosis, treatment, or medications. All questions were answered to patient/family satisfaction. Should symptoms fail to improve or worsen they agree to call or return to clinic or to go to the Emergency Department. Discussed the importance of following up with any needed screening tests/labs/specialist appointments and any requested follow-up recommended by me today. Importance of maintaining follow-up discussed and patient accepts that missed appointments can delay diagnosis and potentially lead to worsening of conditions.    Return in about 9 weeks (around 1/23/2025) for Toenail Care., or sooner if acute issues arise.    I have reviewed the assessment and plan and verified the accuracy of it. No changes to assessment and plan since the information was documented. Richie Watson DPM 11/21/24     I have dictated this note utilizing Dragon Dictation.  Please note that portions of this note were completed with a voice recognition program.  Part of this note may be an electronic  transcription/translation of spoken language to printed text using the Dragon Dictation System.      This document has been electronically signed by Richie Watson DPM on November 21, 2024 11:10 EST

## 2024-12-27 DIAGNOSIS — J84.10 PULMONARY FIBROSIS: ICD-10-CM

## 2024-12-27 RX ORDER — NINTEDANIB 150 MG/1
150 CAPSULE ORAL 2 TIMES DAILY
Qty: 60 CAPSULE | Refills: 11 | Status: SHIPPED | OUTPATIENT
Start: 2024-12-27

## 2025-01-30 ENCOUNTER — OFFICE VISIT (OUTPATIENT)
Dept: PODIATRY | Facility: CLINIC | Age: 80
End: 2025-01-30
Payer: MEDICARE

## 2025-01-30 VITALS
HEIGHT: 71 IN | WEIGHT: 253 LBS | OXYGEN SATURATION: 95 % | SYSTOLIC BLOOD PRESSURE: 160 MMHG | BODY MASS INDEX: 35.42 KG/M2 | HEART RATE: 88 BPM | DIASTOLIC BLOOD PRESSURE: 72 MMHG

## 2025-01-30 DIAGNOSIS — E11.9 NON-INSULIN DEPENDENT TYPE 2 DIABETES MELLITUS: ICD-10-CM

## 2025-01-30 DIAGNOSIS — G62.9 NEUROPATHY: Primary | ICD-10-CM

## 2025-01-30 DIAGNOSIS — E11.8 DM FEET: ICD-10-CM

## 2025-01-30 DIAGNOSIS — L60.0 ONYCHOCRYPTOSIS: ICD-10-CM

## 2025-01-30 DIAGNOSIS — M79.671 FOOT PAIN, BILATERAL: ICD-10-CM

## 2025-01-30 DIAGNOSIS — M79.672 FOOT PAIN, BILATERAL: ICD-10-CM

## 2025-01-30 DIAGNOSIS — B35.1 ONYCHOMYCOSIS: ICD-10-CM

## 2025-01-30 NOTE — PROGRESS NOTES
[facility] - PODIATRY    Today's Date: 25    Patient Name: Abdi Gomez  MRN: [id number]  CSN: [id number]  PCP: Jaimie Stallworth APRN, Last PCP Visit: 2025  Referring Provider: No ref. provider found    SUBJECTIVE     Chief Complaint   Patient presents with    Left Foot - Follow-up, Nail Problem    Right Foot - Follow-up, Nail Problem     HPI: Abdi Gomez, a 79 y.o.male, presents to clinic for painful toenail:    New, Established, New Problem:  est  Location:  Toenails  Duration:   Greater than five years  Onset:  Gradual  Nature:  sore with palpation.  Stable, worsening, improving:   stable  Aggravating factors:  Pain with shoe gear and ambulation.  Previous Treatment: debridement    Patient controlling diabetes via: Oral medication    Medical changes:  none    Patient states their most recent blood glucose readin    Patient denies any fevers, chills, nausea, vomiting, shortness of breath, nor any other constitutional signs nor symptoms.    The patient relates numbness in his first toes bilaterally.    I have reviewed/confirmed previously documented HPI with no changes.     Past Medical History:   Diagnosis Date    Asthma     COPD (chronic obstructive pulmonary disease)     Coronary artery disease     Coronary artery disease involving native coronary artery of native heart without angina pectoris 2021     Status post angioplasty and stent placement in  (details not available)    Diabetes mellitus     Difficulty walking     GERD (gastroesophageal reflux disease)     Gout     HL (hearing loss)     Hyperlipidemia     Hypertension     Pneumonia     Primary central sleep apnea     Sleep apnea     Sleep apnea, obstructive      Past Surgical History:   Procedure Laterality Date    CARDIAC CATHETERIZATION      CORONARY STENT PLACEMENT      EYE SURGERY      SINUS SURGERY      TONSILLECTOMY       Family History   Problem Relation Age of Onset    Heart  failure Mother     Asthma Mother     Cancer Father         Passed with lung cancer     Social History     Socioeconomic History    Marital status:    Tobacco Use    Smoking status: Former     Current packs/day: 0.00     Average packs/day: 1.5 packs/day for 40.0 years (60.0 ttl pk-yrs)     Types: Cigarettes     Start date: 1963     Quit date: 1999     Years since quittin.0     Passive exposure: Past    Smokeless tobacco: Never   Vaping Use    Vaping status: Never Used   Substance and Sexual Activity    Alcohol use: Never    Drug use: Never    Sexual activity: Not Currently     Partners: Female     Allergies   Allergen Reactions    Atorvastatin Other (See Comments)    Hydrocodone Irritability    Keflex [Cephalexin] Rash     Current Outpatient Medications   Medication Sig Dispense Refill    allopurinol (ZYLOPRIM) 100 MG tablet Take 1 tablet by mouth Daily.      aspirin 81 MG chewable tablet Chew 1 tablet Daily.      cetirizine (zyrTEC) 10 MG tablet Take 1 tablet by mouth Daily.      ezetimibe (ZETIA) 10 MG tablet Take 1 tablet by mouth Daily.      famotidine (PEPCID) 20 MG tablet Take 1 tablet by mouth 2 (Two) Times a Day.      fluticasone (FLONASE) 50 MCG/ACT nasal spray INSTILL 2 PUFFS INTO EACH NOSTRIL ONCE DAILY      glipizide (GLUCOTROL XL) 2.5 MG 24 hr tablet Take 1 tablet by mouth 2 (Two) Times a Day.      hydroCHLOROthiazide (MICROZIDE) 12.5 MG capsule Take 1 capsule by mouth Every Morning.      hydroxychloroquine (PLAQUENIL) 200 MG tablet Take 1 tablet by mouth 2 (Two) Times a Day.      losartan (Cozaar) 100 MG tablet Take 1 tablet by mouth Daily. 90 tablet 3    Misc Natural Products (BLACK CHERRY CONCENTRATE PO) Take  by mouth Daily.      montelukast (SINGULAIR) 10 MG tablet Take 1 tablet by mouth Every Night.      Nintedanib Esylate (Ofev) 150 MG capsule Take 150 mg by mouth 2 (Two) Times a Day. 60 capsule 11    Omega-3 Fatty Acids (fish oil) 1200 MG capsule capsule Take 1 capsule by  "mouth Daily.      Red Yeast Rice Extract 600 MG capsule Take  by mouth Daily.      vitamin B-12 (CYANOCOBALAMIN) 1000 MCG tablet Take 1 tablet by mouth Daily.      VITAMIN D, CHOLECALCIFEROL, PO Take  by mouth Daily.      Fluticasone-Salmeterol (Wixela Inhub) 250-50 MCG/ACT DISKUS Inhale 1 puff 2 (Two) Times a Day for 90 days. 3 each 3    ipratropium-albuterol (DUO-NEB) 0.5-2.5 mg/3 ml nebulizer Take 3 mL by nebulization 4 (Four) Times a Day As Needed for Wheezing for up to 90 days. 360 mL 3    tiotropium bromide monohydrate (Spiriva Respimat) 2.5 MCG/ACT aerosol solution inhaler Inhale 2 puffs Daily for 90 days. 3 each 3     No current facility-administered medications for this visit.     Review of Systems   Constitutional: Negative.    Skin:         Painful toenails   Neurological:  Positive for numbness.   All other systems reviewed and are negative.      OBJECTIVE     Vitals:    25 1040   BP: 160/72   Pulse: 88   SpO2: 95%       Body mass index is 35.29 kg/m².    No results found for: \"HGBA1C\"    Lab Results   Component Value Date    GLUCOSE 125 (H) 2024    CALCIUM 10.0 2024     2024    K 4.8 2024    CO2 27.5 2024     2024    BUN 12 2024    CREATININE 1.37 (H) 2024    EGFRIFNONA 60 (L) 2021    BCR 8.8 2024    ANIONGAP 10.5 2024       Patient seen in no apparent distress.      PHYSICAL EXAM:     Foot/Ankle Exam    GENERAL  Appearance:  obese and elderly  Orientation:  AAOx3  Affect:  appropriate  Gait:  unimpaired  Assistance:  independent  Right shoe gear: casual shoe  Left shoe gear: casual shoe    VASCULAR     Right Foot Vascularity   Dorsalis pedis:  2+  Posterior tibial:  2+  Skin temperature:  warm  Edema gradin+ and non-pitting  CFT:  < 3 seconds  Pedal hair growth:  Absent  Varicosities:  mild varicosities     Left Foot Vascularity   Dorsalis pedis:  2+  Posterior tibial:  2+  Skin temperature:  warm  Edema grading:  " 1+ and non-pitting  CFT:  < 3 seconds  Pedal hair growth:  Absent  Varicosities:  mild varicosities     NEUROLOGIC     Right Foot Neurologic   Light touch sensation: diminished  Vibratory sensation: diminished  Hot/Cold sensation: diminished  Protective Sensation using [place]-Shun Monofilament:   Sites intact: 2  Sites tested: 10     Left Foot Neurologic   Light touch sensation: diminished  Vibratory sensation: diminished  Hot/Cold sensation:  diminished  Protective Sensation using [place] Monofilament:   Sites intact: 2  Sites tested: 10    MUSCLE STRENGTH     Right Foot Muscle Strength   Foot dorsiflexion:  4  Foot plantar flexion:  4  Foot inversion:  4  Foot eversion:  4     Left Foot Muscle Strength   Foot dorsiflexion:  4  Foot plantar flexion:  4  Foot inversion:  4  Foot eversion:  4    RANGE OF MOTION     Right Foot Range of Motion   Foot and ankle ROM within normal limits       Left Foot Range of Motion   Foot and ankle ROM within normal limits      DERMATOLOGIC      Right Foot Dermatologic   Skin  Right foot skin is intact.   Nails  1.  Positive for elongated, onychomycosis, abnormal thickness, subungual debris and ingrown toenail.  2.  Positive for elongated, onychomycosis, abnormal thickness, subungual debris and ingrown toenail.     Left Foot Dermatologic   Skin  Left foot skin is intact.   Nails  1.  Positive for elongated, onychomycosis, abnormal thickness, subungual debris and ingrown toenail.  2.  Positive for elongated, onychomycosis, abnormal thickness, subungual debris and ingrown toenail.  3.  Positive for elongated, onychomycosis, abnormal thickness, subungual debris and ingrown toenail.  4.  Positive for elongated, onychomycosis, abnormally thick, subungual debris and ingrown toenail.    I have reexamined the patient the results are consistent with the previously documented exam.      ASSESSMENT/PLAN     Diagnoses and all orders for this visit:    1. Neuropathy (Primary)    2.  Foot pain, bilateral    3. Non-insulin dependent type 2 diabetes mellitus    4. Onychomycosis    5. Onychocryptosis    6. DM feet    Comprehensive lower extremity examination and evaluation was performed.    Discussed findings and treatment plan including risks, benefits, and treatment options with patient in detail. Patient agreed with treatment plan.    Medications and allergies reviewed.  Reviewed available blood glucose and HgB A1C lab values along with other pertinent labs.  These were discussed with the patient as to their importance of diabetic maintenance.    Toenails 1, 2 on Right and 1, 2, 3, 4 on Left were debrided with nail nippers then filed with a Dremel nail doris.  Patient tolerated procedure well without complications.    An After Visit Summary was printed and given to the patient at discharge, including (if requested) any available informative/educational handouts regarding diagnosis, treatment, or medications. All questions were answered to patient/family satisfaction. Should symptoms fail to improve or worsen they agree to call or return to clinic or to go to the Emergency Department. Discussed the importance of following up with any needed screening tests/labs/specialist appointments and any requested follow-up recommended by me today. Importance of maintaining follow-up discussed and patient accepts that missed appointments can delay diagnosis and potentially lead to worsening of conditions.    Return in about 9 weeks (around 4/3/2025) for Toenail Care., or sooner if acute issues arise.    I have reviewed the assessment and plan and verified the accuracy of it. No changes to assessment and plan since the information was documented. Richie Watson, SABINE 01/30/25     I have dictated this note utilizing Dragon Dictation.  Please note that portions of this note were completed with a voice recognition program.  Part of this note may be an electronic transcription/translation of spoken language  to printed text using the Dragon Dictation System.      This document has been electronically signed by Richie Watson DPM on January 30, 2025 10:49 EST

## 2025-01-31 ENCOUNTER — HOSPITAL ENCOUNTER (OUTPATIENT)
Dept: CT IMAGING | Facility: HOSPITAL | Age: 80
Discharge: HOME | End: 2025-01-31
Payer: MEDICARE

## 2025-01-31 ENCOUNTER — HOSPITAL ENCOUNTER (OUTPATIENT)
Dept: RESPIRATORY THERAPY | Facility: HOSPITAL | Age: 80
Discharge: HOME | End: 2025-01-31
Payer: MEDICARE

## 2025-01-31 DIAGNOSIS — F17.201 TOBACCO ABUSE, IN REMISSION: ICD-10-CM

## 2025-01-31 DIAGNOSIS — R06.09 CHRONIC DYSPNEA: ICD-10-CM

## 2025-01-31 DIAGNOSIS — Z23 FLU VACCINE NEED: ICD-10-CM

## 2025-01-31 DIAGNOSIS — J84.10 PULMONARY FIBROSIS: ICD-10-CM

## 2025-01-31 DIAGNOSIS — G47.33 OSA (OBSTRUCTIVE SLEEP APNEA): ICD-10-CM

## 2025-01-31 DIAGNOSIS — J30.2 SEASONAL ALLERGIES: ICD-10-CM

## 2025-01-31 PROCEDURE — 71250 CT THORAX DX C-: CPT

## 2025-01-31 PROCEDURE — 94729 DIFFUSING CAPACITY: CPT

## 2025-01-31 PROCEDURE — 94618 PULMONARY STRESS TESTING: CPT

## 2025-01-31 PROCEDURE — 94060 EVALUATION OF WHEEZING: CPT

## 2025-01-31 PROCEDURE — 94726 PLETHYSMOGRAPHY LUNG VOLUMES: CPT

## 2025-01-31 RX ORDER — ALBUTEROL SULFATE 0.83 MG/ML
2.5 SOLUTION RESPIRATORY (INHALATION) ONCE
Status: COMPLETED | OUTPATIENT
Start: 2025-01-31 | End: 2025-01-31

## 2025-01-31 RX ADMIN — ALBUTEROL SULFATE 2.5 MG: 2.5 SOLUTION RESPIRATORY (INHALATION) at 09:52

## 2025-01-31 NOTE — PROGRESS NOTES
Exercise Oximetry    Patient Name:Abdi Gomez   MRN: [id number]   Date: 01/31/25             ROOM AIR BASELINE   SpO2% 94   Heart Rate 102   Blood Pressure 159/96     EXERCISE ON ROOM AIR SpO2% EXERCISE ON O2 @  LPM SpO2%   1 MINUTE 95 1 MINUTE    2 MINUTES 92 2 MINUTES    3 MINUTES 92 3 MINUTES    4 MINUTES 92 4 MINUTES    5 MINUTES 93 5 MINUTES    6 MINUTES 92 6 MINUTES               Distance Walked  1,000 Feet Distance Walked   Dyspnea (Yady Scale)  4 Dyspnea (Yady Scale)   Fatigue (Yady Scale)  4 Fatigue (Yady Scale)   SpO2% Post Exercise  95 SpO2% Post Exercise   HR Post Exercise  105 HR Post Exercise   Time to Recovery  5 Minute Time to Recovery     Comments:

## 2025-02-03 ENCOUNTER — OFFICE VISIT (OUTPATIENT)
Dept: PULMONOLOGY | Facility: CLINIC | Age: 80
End: 2025-02-03
Payer: MEDICARE

## 2025-02-03 VITALS
HEART RATE: 98 BPM | WEIGHT: 244.2 LBS | OXYGEN SATURATION: 97 % | BODY MASS INDEX: 34.19 KG/M2 | TEMPERATURE: 97.7 F | DIASTOLIC BLOOD PRESSURE: 103 MMHG | RESPIRATION RATE: 18 BRPM | SYSTOLIC BLOOD PRESSURE: 168 MMHG | HEIGHT: 71 IN

## 2025-02-03 DIAGNOSIS — F17.201 TOBACCO ABUSE, IN REMISSION: ICD-10-CM

## 2025-02-03 DIAGNOSIS — J43.9 COMBINED PULMONARY FIBROSIS AND EMPHYSEMA (CPFE): ICD-10-CM

## 2025-02-03 DIAGNOSIS — Z23 ENCOUNTER FOR IMMUNIZATION: Primary | ICD-10-CM

## 2025-02-03 DIAGNOSIS — J84.10 COMBINED PULMONARY FIBROSIS AND EMPHYSEMA (CPFE): ICD-10-CM

## 2025-02-03 DIAGNOSIS — J84.10 PULMONARY FIBROSIS: ICD-10-CM

## 2025-02-03 DIAGNOSIS — J30.2 SEASONAL ALLERGIES: ICD-10-CM

## 2025-02-03 DIAGNOSIS — G47.33 OSA (OBSTRUCTIVE SLEEP APNEA): ICD-10-CM

## 2025-02-03 DIAGNOSIS — E66.9 OBESITY (BMI 30-39.9): ICD-10-CM

## 2025-02-03 PROCEDURE — 99214 OFFICE O/P EST MOD 30 MIN: CPT | Performed by: INTERNAL MEDICINE

## 2025-02-03 PROCEDURE — 1160F RVW MEDS BY RX/DR IN RCRD: CPT | Performed by: INTERNAL MEDICINE

## 2025-02-03 PROCEDURE — G0008 ADMIN INFLUENZA VIRUS VAC: HCPCS | Performed by: INTERNAL MEDICINE

## 2025-02-03 PROCEDURE — 3080F DIAST BP >= 90 MM HG: CPT | Performed by: INTERNAL MEDICINE

## 2025-02-03 PROCEDURE — 90662 IIV NO PRSV INCREASED AG IM: CPT | Performed by: INTERNAL MEDICINE

## 2025-02-03 PROCEDURE — 3077F SYST BP >= 140 MM HG: CPT | Performed by: INTERNAL MEDICINE

## 2025-02-03 PROCEDURE — 1159F MED LIST DOCD IN RCRD: CPT | Performed by: INTERNAL MEDICINE

## 2025-02-03 PROCEDURE — G2211 COMPLEX E/M VISIT ADD ON: HCPCS | Performed by: INTERNAL MEDICINE

## 2025-02-03 RX ORDER — IPRATROPIUM BROMIDE AND ALBUTEROL SULFATE 2.5; .5 MG/3ML; MG/3ML
3 SOLUTION RESPIRATORY (INHALATION) 4 TIMES DAILY PRN
Qty: 360 ML | Refills: 3 | Status: SHIPPED | OUTPATIENT
Start: 2025-02-03 | End: 2025-05-04

## 2025-02-03 NOTE — PROGRESS NOTES
Primary Care Provider  Jaimie Stallworth APRN     Referring Provider  No ref. provider found     Chief Complaint  Pulmonary fibrosis and Follow-up (9 month )    Subjective          History of Presenting Illness  79-year-old male with combined pulmonary fibrosis and emphysema here for follow-up.  Overall doing well on his current Ofev regimen.  2024 chest CT shows no significant changes in pulmonary fibrosis and emphysema.  His 2025 walk test showed he walked 1000 feet compared to 600 feet in 2024.  No evidence of desaturations.  PFTs were overall unchanged compared to last year has total capacity 77%.  His respiratory status is at baseline.  He did have fluid couple of months ago which did impact him somewhat but he is back to his baseline respiratory status. He is short of breath walking up about 2 flights steps.  Dyspnea is moderate severity, worse with activity and relieved with rest.  He is on triple inhaler therapy.  Does wear occasional oxygen with activity.    Patient is taking Singulair, Zyrtec, and Flonase nasal spray for seasonal allergies.  Patient states that he is using his CPAP machine but when he sleeps and receives his supplies through [employer].  Patient denies any morning headaches or excessive daytime sleepiness.  Patient states that he is under the care of cardiologist, Dr. Nguyen for history of coronary artery disease.  Patient denies fever, chills, night sweats, swollen glands in the head and neck, unintentional weight loss, hemoptysis, purulent sputum production, dysphagia, chest pain, palpitations, chest tightness, abdominal pain, nausea, vomiting, and diarrhea.  Patient also denies any myalgias, changes in sense of taste and/or smell, sore throat, any other coronavirus or flu-like symptoms.  Patient denies any leg swelling, orthopnea, paroxysmal nocturnal dyspnea.  Patient is able to perform activities of daily living.      Family History   Problem Relation Age of Onset    Heart  failure Mother     Asthma Mother     Cancer Father         Passed with lung cancer        Social History     Socioeconomic History    Marital status:    Tobacco Use    Smoking status: Former     Current packs/day: 0.00     Average packs/day: 1.5 packs/day for 40.0 years (60.0 ttl pk-yrs)     Types: Cigarettes     Start date: 1963     Quit date: 1999     Years since quittin.1     Passive exposure: Past    Smokeless tobacco: Never   Vaping Use    Vaping status: Never Used   Substance and Sexual Activity    Alcohol use: Never    Drug use: Never    Sexual activity: Not Currently     Partners: Female        Past Medical History:   Diagnosis Date    Asthma     COPD (chronic obstructive pulmonary disease)     Coronary artery disease     Coronary artery disease involving native coronary artery of native heart without angina pectoris 2021     Status post angioplasty and stent placement in  (details not available)    Diabetes mellitus     Difficulty walking     GERD (gastroesophageal reflux disease)     Gout     HL (hearing loss)     Hyperlipidemia     Hypertension     Pneumonia     Primary central sleep apnea     Sleep apnea     Sleep apnea, obstructive         Immunization History   Administered Date(s) Administered    COVID-19 (DOREEN) 2021    FluMist 2-49yrs (Nasal) 2018, 10/02/2019    Fluzone (or Fluarix & Flulaval for VFC) >6mos 2013, 10/01/2019    Fluzone High-Dose 65+yrs 2023, 2024    PEDS-Pneumococcal Conjugate (PCV7) 2023    Pneumococcal Conjugate 13-Valent (PCV13) 2022    Pneumococcal Conjugate 20-Valent (PCV20) 2023    Pneumococcal Polysaccharide (PPSV23) 2013    Tdap 2020       Allergies   Allergen Reactions    Atorvastatin Other (See Comments)    Hydrocodone Irritability    Keflex [Cephalexin] Rash          Current Outpatient Medications:     allopurinol (ZYLOPRIM) 100 MG tablet, Take 1 tablet by mouth Daily.,  Disp: , Rfl:     aspirin 81 MG chewable tablet, Chew 1 tablet Daily., Disp: , Rfl:     cetirizine (zyrTEC) 10 MG tablet, Take 1 tablet by mouth Daily., Disp: , Rfl:     ezetimibe (ZETIA) 10 MG tablet, Take 1 tablet by mouth Daily., Disp: , Rfl:     famotidine (PEPCID) 20 MG tablet, Take 1 tablet by mouth 2 (Two) Times a Day., Disp: , Rfl:     fluticasone (FLONASE) 50 MCG/ACT nasal spray, INSTILL 2 PUFFS INTO EACH NOSTRIL ONCE DAILY, Disp: , Rfl:     glipizide (GLUCOTROL XL) 2.5 MG 24 hr tablet, Take 1 tablet by mouth 2 (Two) Times a Day., Disp: , Rfl:     hydroCHLOROthiazide (MICROZIDE) 12.5 MG capsule, Take 1 capsule by mouth Every Morning., Disp: , Rfl:     hydroxychloroquine (PLAQUENIL) 200 MG tablet, Take 1 tablet by mouth 2 (Two) Times a Day., Disp: , Rfl:     ipratropium-albuterol (DUO-NEB) 0.5-2.5 mg/3 ml nebulizer, Take 3 mL by nebulization 4 (Four) Times a Day As Needed for Wheezing for up to 90 days., Disp: 360 mL, Rfl: 3    losartan (Cozaar) 100 MG tablet, Take 1 tablet by mouth Daily., Disp: 90 tablet, Rfl: 3    Misc Natural Products (BLACK CHERRY CONCENTRATE PO), Take  by mouth Daily., Disp: , Rfl:     montelukast (SINGULAIR) 10 MG tablet, Take 1 tablet by mouth Every Night., Disp: , Rfl:     Nintedanib Esylate (Ofev) 150 MG capsule, Take 150 mg by mouth 2 (Two) Times a Day., Disp: 60 capsule, Rfl: 11    Omega-3 Fatty Acids (fish oil) 1200 MG capsule capsule, Take 1 capsule by mouth Daily., Disp: , Rfl:     Red Yeast Rice Extract 600 MG capsule, Take  by mouth Daily., Disp: , Rfl:     vitamin B-12 (CYANOCOBALAMIN) 1000 MCG tablet, Take 1 tablet by mouth Daily., Disp: , Rfl:     VITAMIN D, CHOLECALCIFEROL, PO, Take  by mouth Daily., Disp: , Rfl:     Fluticasone-Salmeterol (Wixela Inhub) 250-50 MCG/ACT DISKUS, Inhale 1 puff 2 (Two) Times a Day for 90 days., Disp: 3 each, Rfl: 3    tiotropium bromide monohydrate (Spiriva Respimat) 2.5 MCG/ACT aerosol solution inhaler, Inhale 2 puffs Daily for 90 days.,  "Disp: 3 each, Rfl: 3     Objective     Physical Exam  Vital Signs:   WDWN, Alert, NAD.    HEENT:  PERRL, EOMI.  OP, nares clear  Chest: Mildly decreased breath sounds throughout.  Inspiratory Velcro-like crackles at bases bilaterally, normal work of breathing noted.  Patient is able speak full sentences without difficulty.  CV: RRR, no MGR, pulses 2+, equal.  Abd:  Soft, NT, ND, + BS, no HSM, obese  EXT:  no clubbing, no cyanosis, no edema  Neuro:  A&Ox3, CN grossly intact, no focal deficits.  Skin: No rashes or lesions noted.    BP (!) 168/103 (BP Location: Left arm, Patient Position: Sitting, Cuff Size: Large Adult)   Pulse 98   Temp 97.7 °F (36.5 °C) (Oral)   Resp 18   Ht 180.3 cm (71\")   Wt 111 kg (244 lb 3.2 oz)   SpO2 97% Comment: Room air  BMI 34.06 kg/m²         Result Review :   Personally reviewed my last office note.  2025 chest CT personally reviewed showing no changes in pulmonary fibrosis and emphysema.  6-minute walk test personally reviewed showing increase in walk distance compared to last year from 600 feet up to 1000 feet.  2025 PFTs personally viewed showing stable restrictive lung defect with total lung capacity around 77%.  August 2024 CBC with 410 peripheral eosinophils and CMP with no evidence of chronic hypercapnia.         Assessment and Plan      Assessment:  Combined pulmonary fibrosis with emphysema.  FEV1 of 52% predicted.  2025 CT and PFT shows overall stable disease  Interstitial lung disease/pulmonary fibrosis.  On Ofev  Restrictive lung defect  Chronic dyspnea.  At baseline  Occupational exposures to multiple chemicals inhalants.  Obstructive sleep apnea.  Controlled with CPAP  History of COVID-19 back in December 2022.  Treated with Paxlovid by PCP per patient report.  Seasonal allergies.  Patient is under the care of an allergist and is receiving allergy shots.  Peripheral eosinophilia  Class I obesity with BMI 34.0  Coronary artery disease: Patient is under the care of " Wendy.  Tobacco abuse of cigarettes in remission.  Not eligible for lung cancer screening as patient reports that he quit smoking 1999.    Plan:  Will continue current dose of Ofev as planned.  He is alternating use by using it twice a day and daily and every other day fashion  Continue to monitor CBC and CMP while on Ofev  Continue Wixela plus Spiriva with albuterol as needed  Continue Singulair, Zyrtec and Flonase  Allergy shots per allergist  2025 chest CT, PFTs and 6-minute walk test reviewed and discussed with patient.  No changes compared to previous year.  Will need annual PFT and 6-minute walk test as early as January 2026  Diet and exercise counseling provided today.  Recommended 30 minutes daily exercise well is an 1800 -calorie/day diet.  Patient verbalized understanding.  Total time counseling the patient today was 4 minutes  Vaccination status: Up-to-date with flu and pneumonia vaccines.  Flu shot today  Former cigarette smoker.  Not eligible for lung cancer screening    Follow Up   Return in about 6 months (around 8/3/2025).  Patient was given instructions and counseling regarding his condition or for health maintenance advice. Please see specific information pulled into the AVS if appropriate.     Electronically signed by Jaems Jensen MD, 02/03/25, 1:45 PM EST.

## 2025-02-04 DIAGNOSIS — J84.10 PULMONARY FIBROSIS: ICD-10-CM

## 2025-02-04 DIAGNOSIS — N28.89 RENAL MASS, RIGHT: Primary | ICD-10-CM

## 2025-02-04 DIAGNOSIS — R06.09 CHRONIC DYSPNEA: ICD-10-CM

## 2025-02-05 ENCOUNTER — OFFICE VISIT (OUTPATIENT)
Dept: OTOLARYNGOLOGY | Facility: CLINIC | Age: 80
End: 2025-02-05
Payer: MEDICARE

## 2025-02-05 VITALS
BODY MASS INDEX: 34.16 KG/M2 | DIASTOLIC BLOOD PRESSURE: 105 MMHG | HEART RATE: 63 BPM | SYSTOLIC BLOOD PRESSURE: 163 MMHG | HEIGHT: 71 IN | WEIGHT: 244 LBS | TEMPERATURE: 97.4 F

## 2025-02-05 DIAGNOSIS — J33.8 NASAL SINUS POLYP: ICD-10-CM

## 2025-02-05 DIAGNOSIS — J32.9 CHRONIC SINUSITIS, UNSPECIFIED LOCATION: Primary | ICD-10-CM

## 2025-02-05 DIAGNOSIS — G47.33 OSA (OBSTRUCTIVE SLEEP APNEA): ICD-10-CM

## 2025-02-05 DIAGNOSIS — Z87.891 HISTORY OF TOBACCO ABUSE: ICD-10-CM

## 2025-02-05 DIAGNOSIS — J30.9 ALLERGIC RHINITIS, UNSPECIFIED SEASONALITY, UNSPECIFIED TRIGGER: ICD-10-CM

## 2025-02-05 DIAGNOSIS — H90.A32 MIXED CONDUCTIVE AND SENSORINEURAL HEARING LOSS OF LEFT EAR WITH RESTRICTED HEARING OF RIGHT EAR: ICD-10-CM

## 2025-02-05 DIAGNOSIS — H90.A22 SENSORINEURAL HEARING LOSS (SNHL) OF LEFT EAR WITH RESTRICTED HEARING OF RIGHT EAR: ICD-10-CM

## 2025-02-05 DIAGNOSIS — D17.0 LIPOMA OF NECK: ICD-10-CM

## 2025-02-05 DIAGNOSIS — H90.A21 SENSORINEURAL HEARING LOSS (SNHL) OF RIGHT EAR WITH RESTRICTED HEARING OF LEFT EAR: ICD-10-CM

## 2025-02-05 RX ORDER — CETIRIZINE HYDROCHLORIDE 10 MG/1
10 TABLET ORAL DAILY
Qty: 90 TABLET | Refills: 3 | Status: SHIPPED | OUTPATIENT
Start: 2025-02-05

## 2025-02-05 RX ORDER — FLUTICASONE PROPIONATE 50 MCG
2 SPRAY, SUSPENSION (ML) NASAL DAILY
Qty: 16 G | Refills: 10 | Status: SHIPPED | OUTPATIENT
Start: 2025-02-05

## 2025-02-05 RX ORDER — MONTELUKAST SODIUM 10 MG/1
10 TABLET ORAL NIGHTLY
Qty: 90 TABLET | Refills: 3 | Status: SHIPPED | OUTPATIENT
Start: 2025-02-05

## 2025-02-05 NOTE — PATIENT INSTRUCTIONS
1.  Patient has history of chronic sinusitis and nasal polyps.  Rigid nasal endoscopy today shows no evidence of any recurrent polyps or sinus infections present.  2.  Patient has allergies which he has been treated maximally with medications and also allergy immunotherapy as well.  3.  Patient has obstructive sleep apnea which he still uses CPAP.  4.  On examination today there is no evidence of any recurrent lipoma of the neck.  5.  I will see patient on an annual basis now with rigid nasal endoscopy.

## 2025-02-05 NOTE — PROGRESS NOTES
Patient Name: Abdi Gomez   Visit Date: 02/05/2025   Patient ID: [id number]  Provider: Joaquin Hopper MD    Sex: male  Location: [facility] [place] [facility]   YOB: 1945  Location Address: [address] [place] [address], [address] [place] [address],?[place]?[id number]    Primary Care Provider Federica, Jaimie APARNA Hancock  Location Phone: [phone]    Referring Provider: No ref. provider found        Chief Complaint  6 month follow up with rigid nasal scope, nasal sinus polyp, and chronic sinusitis    History of Present Illness  Abdi Gomez is a 79 y.o. male who presents to [facility] for 6 month follow up with rigid nasal scope, nasal sinus polyp, and chronic sinusitis.   Patient has history of chronic eustachian tube dysfunction which she underwent bilateral PE tube placements in July 23, 2019.. Patient also has obstructive sleep apnea which she is on CPAP. Patient has allergic rhinitis which she had been retested and had been on immunotherapy in the past. 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 He had sinus polyps that were removed and last rigid nasal endoscopy on May 25, 2023 revealed no evidence of any recurrent disease. He also has a large neck lipoma which was excised with no evidence of any recurrent disease at this time. Patient is here for follow-up on all of those with no complaints.   Last visit, rigid nasal endoscopy examination shows no evidence of any sinus recurrent polyposis.  Patient obstructive sleep apnea stable with CPAP.  Patient has underlying sensorineural hearing loss but seems to be unremarkable at this point.  Will obtain audiogram at later time.  There is no evidence of recurrent lipoma on examination of the neck.  Chronic sinusitis is stable at this time  Despite history of tobacco abuse patient is not using any tobacco products at this time.  Patient is  here for follow-up 6 months for another rigid nasal endoscopy  Patient is continuing on Singulair, Zyrtec, and Flonase.  Patient has restarted allergy immunotherapy with family allergy and asthma.  Audiogram obtained today shows SRT of 45 bilaterally and discrimination score of 87% on the right and 80% on the left.  Otherwise tympanograms are type a bilaterally.  Pure-tone show right mild to profound sensorineural hearing loss in the left ear with mild to profound mixed hearing loss.  [facility] but he needs to go and get it replaced.  [facility] [place] and also obtain a remote microphone and using Bluetooth on his smart phone can get it activated.  Rigid nasal endoscopy shows no evidence of any recurrent polyps.  Patient is still using CPAP for his obstructive sleep apnea.  Patient has no recurrence of lipoma in the neck.  Regarding allergies patient is still on shots at family allergy asthma.  Along with also medications including Singulair, Flonase, and Zyrtec.  I will see patient in 6 months again for repeat exam.  Patient reports that he has been doing well even despite having fluid he was able to breathe through his nose unremarkably.  He still continues to take the allergy medicines including Singulair, Flonase, and Zyrtec.  Patient also takes allergy immunotherapy.  Past Medical History:   Diagnosis Date    Allergic rhinitis 2020    Asthma     COPD (chronic obstructive pulmonary disease)     Coronary artery disease     Coronary artery disease involving native coronary artery of native heart without angina pectoris 11/29/2021     Status post angioplasty and stent placement in 1999 (details not available)    Diabetes mellitus     Difficulty walking 2022    GERD (gastroesophageal reflux disease)     Gout 2022    HL (hearing loss)     Hyperlipidemia     Hypertension     Pneumonia 2019    Primary central sleep apnea     Sleep apnea     Sleep apnea, obstructive         Past Surgical History:   Procedure Laterality Date    CARDIAC CATHETERIZATION      CORONARY STENT PLACEMENT      EYE SURGERY      SINUS SURGERY      TONSILLECTOMY           Current Outpatient Medications:     allopurinol (ZYLOPRIM) 100 MG tablet, Take 1 tablet by mouth Daily., Disp: , Rfl:     aspirin 81 MG chewable tablet, Chew 1 tablet Daily., Disp: , Rfl:     ezetimibe (ZETIA) 10 MG tablet, Take 1 tablet by mouth Daily., Disp: , Rfl:     famotidine (PEPCID) 20 MG tablet, Take 1 tablet by mouth 2 (Two) Times a Day., Disp: , Rfl:     glipizide (GLUCOTROL XL) 2.5 MG 24 hr tablet, Take 1 tablet by mouth 2 (Two) Times a Day., Disp: , Rfl:     hydroCHLOROthiazide (MICROZIDE) 12.5 MG capsule, Take 1 capsule by mouth Every Morning., Disp: , Rfl:     hydroxychloroquine (PLAQUENIL) 200 MG tablet, Take 1 tablet by mouth 2 (Two) Times a Day., Disp: , Rfl:     ipratropium-albuterol (DUO-NEB) 0.5-2.5 mg/3 ml nebulizer, Take 3 mL by nebulization 4 (Four) Times a Day As Needed for Wheezing for up to 90 days., Disp: 360 mL, Rfl: 3    losartan (Cozaar) 100 MG tablet, Take 1 tablet by mouth Daily., Disp: 90 tablet, Rfl: 3    Misc Natural Products (BLACK CHERRY CONCENTRATE PO), Take  by mouth Daily., Disp: , Rfl:     Nintedanib Esylate (Ofev) 150 MG capsule, Take 150 mg by mouth 2 (Two) Times a Day., Disp: 60 capsule, Rfl: 11    Omega-3 Fatty Acids (fish oil) 1200 MG capsule capsule, Take 1 capsule by mouth Daily., Disp: , Rfl:     Red Yeast Rice Extract 600 MG capsule, Take  by mouth Daily., Disp: , Rfl:     vitamin B-12 (CYANOCOBALAMIN) 1000 MCG tablet, Take 1 tablet by mouth Daily., Disp: , Rfl:     VITAMIN D, CHOLECALCIFEROL, PO, Take  by mouth Daily., Disp: , Rfl:     cetirizine (zyrTEC) 10 MG tablet, Take 1 tablet by mouth Daily., Disp: 90 tablet, Rfl: 3    fluticasone (FLONASE) 50 MCG/ACT nasal spray, Administer 2 sprays into the nostril(s) as directed by provider Daily., Disp: 16 g, Rfl: 10    Fluticasone-Salmeterol  "(Wixela Inhub) 250-50 MCG/ACT DISKUS, Inhale 1 puff 2 (Two) Times a Day for 90 days., Disp: 3 each, Rfl: 3    montelukast (SINGULAIR) 10 MG tablet, Take 1 tablet by mouth Every Night., Disp: 90 tablet, Rfl: 3    tiotropium bromide monohydrate (Spiriva Respimat) 2.5 MCG/ACT aerosol solution inhaler, Inhale 2 puffs Daily for 90 days., Disp: 3 each, Rfl: 3     Allergies   Allergen Reactions    Atorvastatin Other (See Comments)    Hydrocodone Irritability    Keflex [Cephalexin] Rash       Social History     Tobacco Use    Smoking status: Former     Current packs/day: 0.00     Average packs/day: 1.5 packs/day for 40.0 years (60.0 ttl pk-yrs)     Types: Cigarettes     Start date: 1963     Quit date: 1999     Years since quittin.1     Passive exposure: Past    Smokeless tobacco: Never   Vaping Use    Vaping status: Never Used   Substance Use Topics    Alcohol use: Never    Drug use: Never        Objective     Vital Signs:   Vitals:    25 1456   BP: (!) 163/105   Pulse: 63   Temp: 97.4 °F (36.3 °C)   Weight: 111 kg (244 lb)   Height: 180.3 cm (71\")       Tobacco Use: Medium Risk (2025)    Patient History     Smoking Tobacco Use: Former     Smokeless Tobacco Use: Never     Passive Exposure: Past         Physical Exam    Constitutional   Appearance  well developed, well-nourished, alert and in no acute distress, voice clear and strong    Head   Inspection  no deformities or lesions      Face   Inspection  no facial lesions; House-Brackmann I/VI bilaterally   Palpation  no TMJ crepitus nor  muscle tenderness bilaterally     Eyes/Vision   Visual Fields  extraocular movements are intact, no spontaneous or gaze-induced nystagmus  Conjunctivae  clear   Sclerae  clear   Pupils and Irises  pupils equal, round, and reactive to light.   Nystagmus  not present     Ears, Nose, Mouth and Throat  Ears  External Ears  appearance within normal limits, no lesions present   Otoscopic Examination  tympanic " membrane appearance within normal limits bilaterally without perforations, well-aerated middle ears   Hearing  intact to conversational voice both ears   Tunning fork testing    Rinne:  Storey:    Nose  External Nose  appearance normal   Intranasal Exam  mucosa within normal limits, vestibules normal, no intranasal lesions present, septum midline, sinuses non tender to percussion   Modified Emery Test:    Oral Cavity  Oral Mucosa  oral mucosa normal without pallor or cyanosis   Lips  lip appearance normal   Teeth  normal dentition for age   Gums  gums pink, non-swollen, no bleeding present   Tongue  tongue appearance normal; normal mobility   Palate  hard palate normal, soft palate appearance normal with symmetric mobility     Throat  Oropharynx  no inflammation or lesions present, tonsils within normal limits   Hypopharynx  appearance within normal limits   Larynx  voice normal     Neck  Inspection/Palpation  normal appearance, no masses or tenderness, trachea midline; thyroid size normal, nontender, no nodules or masses present on palpation     Lymphatic  Neck  no lymphadenopathy present   Supraclavicular Nodes  no lymphadenopathy present   Preauricular Nodes  no lymphadenopathy present     Respiratory  Respiratory Effort  breathing unlabored   Inspection of Chest  normal appearance, no retractions     Musculoskeletal   Cervical back: Normal range of motion and neck supple.      Skin and Subcutaneous Tissue  General Inspection  Regarding face and neck - there are no rashes present, no lesions present, and no areas of discoloration     Neurologic  Cranial Nerves  cranial nerves II-XII are grossly intact bilaterally   Gait and Station  normal gait, able to stand without diffculty    Psychiatric  Judgement and Insight  judgment and insight intact   Mood and Affect  mood normal, affect appropriate       RESULTS REVIEWED    I have reviewed the following information:   []  Previous Internal Note  []  Previous External  "Note:   []  Ordered Tests & Results:      Pathology: No results found for: \"MICRO\"    PTH, Intact   Date Value Ref Range Status   08/26/2024 36.6 15.0 - 65.0 pg/mL Final     Calcium   Date Value Ref Range Status   08/26/2024 10.0 8.6 - 10.5 mg/dL Final       CT Chest Without Contrast Diagnostic    Result Date: 2/4/2025  Impression: CT chest without IV contrast demonstrating moderately severe emphysema. Findings consistent with underlying interstitial lung disease appear stable. No new or suspicious lung nodule. 4.5 cm mass in the lower pole right kidney, not adequately characterized. Bilateral renal ultrasound is recommended for further evaluation. Electronically Signed: Samir Arboleda MD  2/4/2025 9:54 AM EST  Workstation ID: [id number]      I have discussed the interpretation of the above results with the patient.    Nasal Endoscopy    Date/Time: 2/5/2025 3:06 PM    Performed by: Joaquin Hopper MD  Authorized by: Joaquin Hopper MD    Consent:     Consent obtained:  Verbal    Consent given by:  Patient    Risks discussed:  Bleeding, infection and pain    Alternatives discussed:  No treatment and delayed treatment  Procedure details:     Medication:  Afrin    Instrument: rigid nasal endoscopy      Scope location: bilateral nare    Post-procedure details:     Patient tolerance of procedure:  Tolerated well  Comments:      After topical anesthetic and decongestant application, rigid nasal endoscopy was performed in both nostrils.  Both middle meatus and ethmoid cavities are wide open and clear with no evidence of any recurrent polyps.  Frontal recess and sphenoid recess are also clear.  Maxillary antrostomies are wide open with normal mucosa.  Nasal airway is wide open with septum in midline and excellent nasal airways present.  Patient tolerated procedure well.            Assessment and Plan   Diagnoses and all orders for this visit:    1. Chronic sinusitis, unspecified location (Primary)  -     Nasal endoscopy  -    "  fluticasone (FLONASE) 50 MCG/ACT nasal spray; Administer 2 sprays into the nostril(s) as directed by provider Daily.  Dispense: 16 g; Refill: 10  -     cetirizine (zyrTEC) 10 MG tablet; Take 1 tablet by mouth Daily.  Dispense: 90 tablet; Refill: 3  -     montelukast (SINGULAIR) 10 MG tablet; Take 1 tablet by mouth Every Night.  Dispense: 90 tablet; Refill: 3    2. Nasal sinus polyp  -     Nasal endoscopy  -     fluticasone (FLONASE) 50 MCG/ACT nasal spray; Administer 2 sprays into the nostril(s) as directed by provider Daily.  Dispense: 16 g; Refill: 10  -     cetirizine (zyrTEC) 10 MG tablet; Take 1 tablet by mouth Daily.  Dispense: 90 tablet; Refill: 3  -     montelukast (SINGULAIR) 10 MG tablet; Take 1 tablet by mouth Every Night.  Dispense: 90 tablet; Refill: 3    3. FILOMENA (obstructive sleep apnea)    4. Lipoma of neck    5. Mixed conductive and sensorineural hearing loss of left ear with restricted hearing of right ear    6. Allergic rhinitis, unspecified seasonality, unspecified trigger  -     fluticasone (FLONASE) 50 MCG/ACT nasal spray; Administer 2 sprays into the nostril(s) as directed by provider Daily.  Dispense: 16 g; Refill: 10  -     cetirizine (zyrTEC) 10 MG tablet; Take 1 tablet by mouth Daily.  Dispense: 90 tablet; Refill: 3  -     montelukast (SINGULAIR) 10 MG tablet; Take 1 tablet by mouth Every Night.  Dispense: 90 tablet; Refill: 3    7. History of tobacco abuse    8. Sensorineural hearing loss (SNHL) of right ear with restricted hearing of left ear    9. 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 His smoking use included cigarettes. He started smoking about 62 years ago. He has a 60 pack-year smoking history. He has been exposed to tobacco smoke. He has never used smokeless tobacco.         Plan:  Patient Instructions   1.  Patient has history of chronic sinusitis and nasal polyps.  Rigid nasal endoscopy today shows no evidence of any recurrent polyps or sinus infections present.  2.  Patient has allergies which he has been treated maximally with medications and also allergy immunotherapy as well.  3.  Patient has obstructive sleep apnea which he still uses CPAP.  4.  On examination today there is no evidence of any recurrent lipoma of the neck.  5.  I will see patient on an annual basis now with rigid nasal endoscopy.        Follow Up   Return in about 1 year (around 2/5/2026), or Follow-up 1 year with rigid nasal endoscopy.  Patient was given instructions and counseling regarding his condition or for health maintenance advice. Please see specific information pulled into the AVS if appropriate.

## 2025-02-21 ENCOUNTER — TELEPHONE (OUTPATIENT)
Dept: PULMONOLOGY | Facility: CLINIC | Age: 80
End: 2025-02-21

## 2025-02-21 NOTE — TELEPHONE ENCOUNTER
Caller: ABDI WITH [employer]    Relationship to patient:     Best call back number: [phone]    Patient is needing: FAXING ORDER FOR NEUROLOGICAL TEST THAT NEEDS TO BE SIGNED BY VADIM. IT HAS BEEN FAXED TWICE BUT NOT IN CHART

## 2025-03-10 ENCOUNTER — HOSPITAL ENCOUNTER (OUTPATIENT)
Dept: ULTRASOUND IMAGING | Facility: HOSPITAL | Age: 80
Discharge: HOME | End: 2025-03-10
Admitting: NURSE PRACTITIONER
Payer: MEDICARE

## 2025-03-10 DIAGNOSIS — N28.89 RENAL MASS, RIGHT: ICD-10-CM

## 2025-03-10 PROCEDURE — 76775 US EXAM ABDO BACK WALL LIM: CPT

## 2025-03-13 DIAGNOSIS — N28.1 RENAL CYST: Primary | ICD-10-CM

## 2025-04-24 ENCOUNTER — OFFICE VISIT (OUTPATIENT)
Dept: PODIATRY | Facility: CLINIC | Age: 80
End: 2025-04-24
Payer: MEDICARE

## 2025-04-24 VITALS
HEIGHT: 71 IN | OXYGEN SATURATION: 95 % | HEART RATE: 98 BPM | WEIGHT: 251 LBS | BODY MASS INDEX: 35.14 KG/M2 | DIASTOLIC BLOOD PRESSURE: 68 MMHG | SYSTOLIC BLOOD PRESSURE: 139 MMHG | TEMPERATURE: 98.4 F

## 2025-04-24 DIAGNOSIS — G62.9 NEUROPATHY: ICD-10-CM

## 2025-04-24 DIAGNOSIS — M79.672 FOOT PAIN, BILATERAL: ICD-10-CM

## 2025-04-24 DIAGNOSIS — E11.8 DM FEET: ICD-10-CM

## 2025-04-24 DIAGNOSIS — B35.1 ONYCHOMYCOSIS: Primary | ICD-10-CM

## 2025-04-24 DIAGNOSIS — E11.9 NON-INSULIN DEPENDENT TYPE 2 DIABETES MELLITUS: ICD-10-CM

## 2025-04-24 DIAGNOSIS — M79.671 FOOT PAIN, BILATERAL: ICD-10-CM

## 2025-04-24 DIAGNOSIS — L60.0 ONYCHOCRYPTOSIS: ICD-10-CM

## 2025-04-24 NOTE — PROGRESS NOTES
[facility] - PODIATRY    Today's Date: 25    Patient Name: Abdi Gomez  MRN: [id number]  CSN: [id number]  PCP: Jaimie Stallworth APRN, Last PCP Visit: 2025  Referring Provider: No ref. provider found    SUBJECTIVE     Chief Complaint   Patient presents with    Left Foot - Follow-up, Nail Problem    Right Foot - Follow-up, Nail Problem     HPI: Abdi Gomez, a 80 y.o.male, presents to clinic for painful toenail:    New, Established, New Problem:  est  Location:  Toenails  Duration:   Greater than five years  Onset:  Gradual  Nature:  sore with palpation.  Stable, worsening, improving:   stable  Aggravating factors:  Pain with shoe gear and ambulation.  Previous Treatment: debridement    Patient controlling diabetes via: Oral medication    Medical changes:  no changes    Patient states their most recent blood glucose readin    Patient denies any fevers, chills, nausea, vomiting, shortness of breath, nor any other constitutional signs nor symptoms.    The patient relates numbness in his first toes bilaterally.    I have reviewed/confirmed previously documented HPI with no changes.     Past Medical History:   Diagnosis Date    Allergic rhinitis     Asthma     Chronic idiopathic fibrosing alveolitis     COPD (chronic obstructive pulmonary disease)     Coronary artery disease     Coronary artery disease involving native coronary artery of native heart without angina pectoris 2021     Status post angioplasty and stent placement in  (details not available)    Diabetes mellitus     Difficulty walking     GERD (gastroesophageal reflux disease)     Gout     HL (hearing loss)     Hyperlipidemia     Hypertension 1996    Pneumonia 2019    Primary central sleep apnea     Sleep apnea     Sleep apnea, obstructive      Past Surgical History:   Procedure Laterality Date    CARDIAC CATHETERIZATION      CORONARY STENT PLACEMENT      EYE SURGERY      SINUS  SURGERY      TONSILLECTOMY       Family History   Problem Relation Age of Onset    Heart failure Mother     Asthma Mother     Cancer Father         Passed with lung cancer     Social History     Socioeconomic History    Marital status:    Tobacco Use    Smoking status: Former     Current packs/day: 0.00     Average packs/day: 1.5 packs/day for 40.0 years (60.0 ttl pk-yrs)     Types: Cigarettes     Start date: 1963     Quit date: 1999     Years since quittin.3     Passive exposure: Past    Smokeless tobacco: Never   Vaping Use    Vaping status: Never Used   Substance and Sexual Activity    Alcohol use: Never    Drug use: Never    Sexual activity: Not Currently     Partners: Female     Allergies   Allergen Reactions    Atorvastatin Other (See Comments)    Hydrocodone Irritability    Keflex [Cephalexin] Rash     Current Outpatient Medications   Medication Sig Dispense Refill    allopurinol (ZYLOPRIM) 100 MG tablet Take 1 tablet by mouth Daily.      aspirin 81 MG chewable tablet Chew 1 tablet Daily.      cetirizine (zyrTEC) 10 MG tablet Take 1 tablet by mouth Daily. 90 tablet 3    ezetimibe (ZETIA) 10 MG tablet Take 1 tablet by mouth Daily.      famotidine (PEPCID) 20 MG tablet Take 1 tablet by mouth 2 (Two) Times a Day.      fluticasone (FLONASE) 50 MCG/ACT nasal spray Administer 2 sprays into the nostril(s) as directed by provider Daily. 16 g 10    Fluticasone-Salmeterol (Wixela Inhub) 250-50 MCG/ACT DISKUS Inhale 1 puff 2 (Two) Times a Day for 90 days. 3 each 3    glipizide (GLUCOTROL XL) 2.5 MG 24 hr tablet Take 1 tablet by mouth 2 (Two) Times a Day.      hydroCHLOROthiazide (MICROZIDE) 12.5 MG capsule Take 1 capsule by mouth Every Morning.      hydroxychloroquine (PLAQUENIL) 200 MG tablet Take 1 tablet by mouth 2 (Two) Times a Day.      ipratropium-albuterol (DUO-NEB) 0.5-2.5 mg/3 ml nebulizer Take 3 mL by nebulization 4 (Four) Times a Day As Needed for Wheezing for up to 90 days. 360 mL 3     "losartan (Cozaar) 100 MG tablet Take 1 tablet by mouth Daily. 90 tablet 3    Misc Natural Products (BLACK CHERRY CONCENTRATE PO) Take  by mouth Daily.      montelukast (SINGULAIR) 10 MG tablet Take 1 tablet by mouth Every Night. 90 tablet 3    Nintedanib Esylate (Ofev) 150 MG capsule Take 150 mg by mouth 2 (Two) Times a Day. 60 capsule 11    Omega-3 Fatty Acids (fish oil) 1200 MG capsule capsule Take 1 capsule by mouth Daily.      Red Yeast Rice Extract 600 MG capsule Take  by mouth Daily.      vitamin B-12 (CYANOCOBALAMIN) 1000 MCG tablet Take 1 tablet by mouth Daily.      VITAMIN D, CHOLECALCIFEROL, PO Take  by mouth Daily.      tiotropium bromide monohydrate (Spiriva Respimat) 2.5 MCG/ACT aerosol solution inhaler Inhale 2 puffs Daily for 90 days. 3 each 3     No current facility-administered medications for this visit.     Review of Systems   Constitutional: Negative.    Skin:         Painful toenails   Neurological:  Positive for numbness.   All other systems reviewed and are negative.      OBJECTIVE     Vitals:    25 1106   BP: 139/68   Pulse: 98   Temp: 98.4 °F (36.9 °C)   SpO2: 95%       Body mass index is 35.01 kg/m².    No results found for: \"HGBA1C\"    Lab Results   Component Value Date    GLUCOSE 125 (H) 2024    CALCIUM 10.0 2024     2024    K 4.8 2024    CO2 27.5 2024     2024    BUN 12 2024    CREATININE 1.37 (H) 2024    EGFRIFNONA 60 (L) 2021    BCR 8.8 2024    ANIONGAP 10.5 2024       Patient seen in no apparent distress.      PHYSICAL EXAM:     Foot/Ankle Exam    GENERAL  Appearance:  obese and elderly  Orientation:  AAOx3  Affect:  appropriate  Gait:  unimpaired  Assistance:  independent  Right shoe gear: casual shoe  Left shoe gear: casual shoe    VASCULAR     Right Foot Vascularity   Dorsalis pedis:  2+  Posterior tibial:  2+  Skin temperature:  warm  Edema gradin+ and non-pitting  CFT:  < 3 seconds  Pedal " hair growth:  Absent  Varicosities:  mild varicosities     Left Foot Vascularity   Dorsalis pedis:  2+  Posterior tibial:  2+  Skin temperature:  warm  Edema gradin+ and non-pitting  CFT:  < 3 seconds  Pedal hair growth:  Absent  Varicosities:  mild varicosities     NEUROLOGIC     Right Foot Neurologic   Light touch sensation: diminished  Vibratory sensation: diminished  Hot/Cold sensation: diminished  Protective Sensation using Orondo-Shun Monofilament:   Sites intact: 2  Sites tested: 10     Left Foot Neurologic   Light touch sensation: diminished  Vibratory sensation: diminished  Hot/Cold sensation:  diminished  Protective Sensation using Orondo-Shun Monofilament:   Sites intact: 2  Sites tested: 10    MUSCLE STRENGTH     Right Foot Muscle Strength   Foot dorsiflexion:  4  Foot plantar flexion:  4  Foot inversion:  4  Foot eversion:  4     Left Foot Muscle Strength   Foot dorsiflexion:  4  Foot plantar flexion:  4  Foot inversion:  4  Foot eversion:  4    RANGE OF MOTION     Right Foot Range of Motion   Foot and ankle ROM within normal limits       Left Foot Range of Motion   Foot and ankle ROM within normal limits      DERMATOLOGIC      Right Foot Dermatologic   Skin  Right foot skin is intact.   Nails  1.  Positive for elongated, onychomycosis, abnormal thickness, subungual debris and ingrown toenail.  2.  Positive for elongated, onychomycosis, abnormal thickness, subungual debris and ingrown toenail.     Left Foot Dermatologic   Skin  Left foot skin is intact.   Nails  1.  Positive for elongated, onychomycosis, abnormal thickness, subungual debris and ingrown toenail.  2.  Positive for elongated, onychomycosis, abnormal thickness, subungual debris and ingrown toenail.  3.  Positive for elongated, onychomycosis, abnormal thickness, subungual debris and ingrown toenail.  4.  Positive for elongated, onychomycosis, abnormally thick, subungual debris and ingrown toenail.    I have reexamined the  patient the results are consistent with the previously documented exam.      ASSESSMENT/PLAN     Diagnoses and all orders for this visit:    1. Onychomycosis (Primary)    2. Non-insulin dependent type 2 diabetes mellitus    3. Foot pain, bilateral    4. Neuropathy    5. Onychocryptosis    6. DM feet    Comprehensive lower extremity examination and evaluation was performed.    Discussed findings and treatment plan including risks, benefits, and treatment options with patient in detail. Patient agreed with treatment plan.    Medications and allergies reviewed.  Reviewed available blood glucose and HgB A1C lab values along with other pertinent labs.  These were discussed with the patient as to their importance of diabetic maintenance.    Toenails 1, 2 on Right and 1, 2, 3, 4 on Left were debrided with nail nippers then filed with a Dremel nail doris.  Patient tolerated procedure well without complications.    An After Visit Summary was printed and given to the patient at discharge, including (if requested) any available informative/educational handouts regarding diagnosis, treatment, or medications. All questions were answered to patient/family satisfaction. Should symptoms fail to improve or worsen they agree to call or return to clinic or to go to the Emergency Department. Discussed the importance of following up with any needed screening tests/labs/specialist appointments and any requested follow-up recommended by me today. Importance of maintaining follow-up discussed and patient accepts that missed appointments can delay diagnosis and potentially lead to worsening of conditions.    Return in about 9 weeks (around 6/26/2025) for Toenail Care., or sooner if acute issues arise.    I have reviewed the assessment and plan and verified the accuracy of it. No changes to assessment and plan since the information was documented. Richie Watson DPM 04/24/25     I have dictated this note utilizing Dragon Dictation.   Please note that portions of this note were completed with a voice recognition program.  Part of this note may be an electronic transcription/translation of spoken language to printed text using the Dragon Dictation System.      This document has been electronically signed by Richie Watson DPM on April 24, 2025 11:15 EDT

## 2025-05-05 ENCOUNTER — OFFICE VISIT (OUTPATIENT)
Dept: CARDIOLOGY | Facility: CLINIC | Age: 80
End: 2025-05-05
Payer: MEDICARE

## 2025-05-05 VITALS
WEIGHT: 253 LBS | DIASTOLIC BLOOD PRESSURE: 103 MMHG | BODY MASS INDEX: 35.42 KG/M2 | HEIGHT: 71 IN | SYSTOLIC BLOOD PRESSURE: 151 MMHG | HEART RATE: 104 BPM

## 2025-05-05 DIAGNOSIS — I10 ESSENTIAL HYPERTENSION: ICD-10-CM

## 2025-05-05 DIAGNOSIS — I25.10 CORONARY ARTERY DISEASE INVOLVING NATIVE CORONARY ARTERY OF NATIVE HEART WITHOUT ANGINA PECTORIS: Primary | Chronic | ICD-10-CM

## 2025-05-05 DIAGNOSIS — E78.2 MIXED HYPERLIPIDEMIA: ICD-10-CM

## 2025-05-05 PROCEDURE — 99214 OFFICE O/P EST MOD 30 MIN: CPT | Performed by: INTERNAL MEDICINE

## 2025-05-05 PROCEDURE — 1160F RVW MEDS BY RX/DR IN RCRD: CPT | Performed by: INTERNAL MEDICINE

## 2025-05-05 PROCEDURE — G2211 COMPLEX E/M VISIT ADD ON: HCPCS | Performed by: INTERNAL MEDICINE

## 2025-05-05 PROCEDURE — 3080F DIAST BP >= 90 MM HG: CPT | Performed by: INTERNAL MEDICINE

## 2025-05-05 PROCEDURE — 3077F SYST BP >= 140 MM HG: CPT | Performed by: INTERNAL MEDICINE

## 2025-05-05 PROCEDURE — 1159F MED LIST DOCD IN RCRD: CPT | Performed by: INTERNAL MEDICINE

## 2025-05-05 RX ORDER — DILTIAZEM HYDROCHLORIDE 120 MG/1
120 CAPSULE, COATED, EXTENDED RELEASE ORAL DAILY
Qty: 90 CAPSULE | Refills: 3 | Status: SHIPPED | OUTPATIENT
Start: 2025-05-05

## 2025-05-05 NOTE — ASSESSMENT & PLAN NOTE
Blood pressure is significantly elevated in the office today and also during multiple recent other office visits.  However he reports that is better controlled at home and this morning it was 140/80 mmHg.  Heart rate has been running high, sinus tachycardia noted previously.  Of note, he was previously on diltiazem CD.  We will restart the medication Cardizem  mg daily.  Encouraged to keep home blood pressure and heart rate log.  If blood pressure is low, losartan dose may be decreased.  Will get the latest labs from PCP office.

## 2025-05-05 NOTE — PROGRESS NOTES
CARDIOLOGY FOLLOW-UP PROGRESS NOTE        Chief Complaint  Follow-up and Coronary Artery Disease    Subjective            Abdi Gomez presents to [facility] CARDIOLOGY  History of Present Illness      Ms Gomez is here for a routine 6-month follow-up visit.  Overall he feels fine at his baseline.  He continues to report shortness of breath which is stable.  Denies chest pain.  His heart rate mostly stays at 100 at rest.  Occasionally he gets palpitations.  He was previously on Cardizem CD, which was discontinued due to low blood pressure.    Past History:    1) Coronary artery disease, status post angioplasty and stent placement in 1999 (details not available); 2) Hypertension; 3) Hyperlipidemia; 4) Diabetes mellitus; 5) Chronic sinus problems. 6) Obstructive sleep apnea 7) Pulmonary fibrosis/emphysema    Medical History:  Past Medical History:   Diagnosis Date    Allergic rhinitis 2020    Asthma     Chronic idiopathic fibrosing alveolitis     COPD (chronic obstructive pulmonary disease)     Coronary artery disease     Coronary artery disease involving native coronary artery of native heart without angina pectoris 11/29/2021     Status post angioplasty and stent placement in 1999 (details not available)    Diabetes mellitus 2011    Difficulty walking 2022    GERD (gastroesophageal reflux disease)     Gout 2022    HL (hearing loss)     Hyperlipidemia     Hypertension 1996    Pneumonia 2019    Primary central sleep apnea     Sleep apnea     Sleep apnea, obstructive        Family History: family history includes Asthma in his mother; Cancer in his father; Heart failure in his mother.     Social History: reports that he quit smoking about 26 years ago. His smoking use included cigarettes. He started smoking about 62 years ago. He has a 60 pack-year smoking history. He has been exposed to tobacco smoke. He has never used smokeless tobacco. He reports that he does not drink alcohol and does not use  drugs.    Allergies: Atorvastatin, Hydrocodone, and Keflex [cephalexin]    Current Outpatient Medications on File Prior to Visit   Medication Sig    allopurinol (ZYLOPRIM) 100 MG tablet Take 1 tablet by mouth Daily.    aspirin 81 MG chewable tablet Chew 1 tablet Daily.    cetirizine (zyrTEC) 10 MG tablet Take 1 tablet by mouth Daily.    ezetimibe (ZETIA) 10 MG tablet Take 1 tablet by mouth Daily.    famotidine (PEPCID) 20 MG tablet Take 1 tablet by mouth 2 (Two) Times a Day.    fluticasone (FLONASE) 50 MCG/ACT nasal spray Administer 2 sprays into the nostril(s) as directed by provider Daily.    glipizide (GLUCOTROL XL) 2.5 MG 24 hr tablet Take 1 tablet by mouth 2 (Two) Times a Day.    hydroCHLOROthiazide (MICROZIDE) 12.5 MG capsule Take 1 capsule by mouth Every Morning.    hydroxychloroquine (PLAQUENIL) 200 MG tablet Take 1 tablet by mouth 2 (Two) Times a Day.    losartan (Cozaar) 100 MG tablet Take 1 tablet by mouth Daily.    Misc Natural Products (BLACK CHERRY CONCENTRATE PO) Take  by mouth Daily.    montelukast (SINGULAIR) 10 MG tablet Take 1 tablet by mouth Every Night.    Nintedanib Esylate (Ofev) 150 MG capsule Take 150 mg by mouth 2 (Two) Times a Day.    Omega-3 Fatty Acids (fish oil) 1200 MG capsule capsule Take 1 capsule by mouth Daily.    Red Yeast Rice Extract 600 MG capsule Take  by mouth Daily.    vitamin B-12 (CYANOCOBALAMIN) 1000 MCG tablet Take 1 tablet by mouth Daily.    VITAMIN D, CHOLECALCIFEROL, PO Take  by mouth Daily.    Fluticasone-Salmeterol (Wixela Inhub) 250-50 MCG/ACT DISKUS Inhale 1 puff 2 (Two) Times a Day for 90 days.    ipratropium-albuterol (DUO-NEB) 0.5-2.5 mg/3 ml nebulizer Take 3 mL by nebulization 4 (Four) Times a Day As Needed for Wheezing for up to 90 days.    tiotropium bromide monohydrate (Spiriva Respimat) 2.5 MCG/ACT aerosol solution inhaler Inhale 2 puffs Daily for 90 days.     No current facility-administered medications on file prior to visit.          Review of Systems  "  Respiratory:  Positive for cough, shortness of breath and wheezing.    Cardiovascular:  Negative for chest pain, palpitations and leg swelling.   Gastrointestinal:  Negative for nausea and vomiting.   Neurological:  Negative for dizziness and syncope.        Objective     BP (!) 151/103   Pulse 104   Ht 180.3 cm (71\")   Wt 115 kg (253 lb)   BMI 35.29 kg/m²       Physical Exam    General : Alert, awake, no acute distress  Neck : Supple, no carotid bruit, no jugular venous distention  CVS : Regular rate and rhythm, no murmur, rubs or gallops  Lungs: Clear to auscultation bilaterally, no crackles or rhonchi  Abdomen: Soft, nontender, bowel sounds heard in all 4 quadrants  Extremities: Warm, well-perfused, no pedal edema    Result Review :     The following data was reviewed by: Sanford Nguyen MD on 05/05/2025:    CMP          8/26/2024    10:02   CMP   Glucose 125    BUN 12    Creatinine 1.37    EGFR 53.1    Sodium 141    Potassium 4.8    Chloride 103    Calcium 10.0    BUN/Creatinine Ratio 8.8    Anion Gap 10.5      CBC          8/26/2024    10:02   CBC   WBC 7.75    RBC 4.71    Hemoglobin 14.6    Hematocrit 43.0    MCV 91.3    MCH 31.0    MCHC 34.0    RDW 13.1    Platelets 197               Data reviewed: Cardiology studies        Results for orders placed in visit on 06/15/22    Adult Transthoracic Echo Complete W/ Cont if Necessary Per Protocol    Interpretation Summary  · Left ventricular ejection fraction appears to be 56 - 60%. Left ventricular systolic function is normal.  · Left ventricular diastolic function is consistent with (grade I) impaired relaxation.  · There are no significant valvular abnormalities.  · There is mild dilation of aortic root and ascending aorta.  · Estimated right ventricular systolic pressure from tricuspid regurgitation is normal (<35 mmHg).                   Assessment and Plan        Diagnoses and all orders for this visit:    1. Coronary artery disease involving native " coronary artery of native heart without angina pectoris (Primary)  Assessment & Plan:  He is chest pain-free.  Systolic function is preserved.  Continue aspirin, Zetia.      2. Essential hypertension  Assessment & Plan:  Blood pressure is significantly elevated in the office today and also during multiple recent other office visits.  However he reports that is better controlled at home and this morning it was 140/80 mmHg.  Heart rate has been running high, sinus tachycardia noted previously.  Of note, he was previously on diltiazem CD.  We will restart the medication Cardizem  mg daily.  Encouraged to keep home blood pressure and heart rate log.  If blood pressure is low, losartan dose may be decreased.  Will get the latest labs from PCP office.    Orders:  -     dilTIAZem CD (CARDIZEM CD) 120 MG 24 hr capsule; Take 1 capsule by mouth Daily.  Dispense: 90 capsule; Refill: 3    3. Mixed hyperlipidemia  Assessment & Plan:  No lipid panel available.  He is intolerant to all statins.  Will get the latest labs from PCP office.  Continue Zetia and red yeast rice.                Follow Up     Return in about 3 months (around 8/5/2025) for Next scheduled follow up, with Shima BRAUN.    Patient was given instructions and counseling regarding his condition or for health maintenance advice. Please see specific information pulled into the AVS if appropriate.

## 2025-05-05 NOTE — ASSESSMENT & PLAN NOTE
No lipid panel available.  He is intolerant to all statins.  Will get the latest labs from PCP office.  Continue Zetia and red yeast rice.

## 2025-06-16 DIAGNOSIS — J44.9 CHRONIC OBSTRUCTIVE PULMONARY DISEASE, UNSPECIFIED COPD TYPE: Primary | ICD-10-CM

## 2025-06-16 RX ORDER — FLUTICASONE PROPIONATE AND SALMETEROL 250; 50 UG/1; UG/1
1 POWDER RESPIRATORY (INHALATION) 2 TIMES DAILY
Qty: 3 EACH | Refills: 1 | Status: SHIPPED | OUTPATIENT
Start: 2025-06-16 | End: 2025-09-14

## 2025-06-16 RX ORDER — TIOTROPIUM BROMIDE INHALATION SPRAY 3.12 UG/1
2 SPRAY, METERED RESPIRATORY (INHALATION)
Qty: 3 EACH | Refills: 1 | Status: SHIPPED | OUTPATIENT
Start: 2025-06-16 | End: 2025-09-14

## 2025-06-16 NOTE — TELEPHONE ENCOUNTER
PATIENT NEEDS REFILLS ON HIS TIOTROPIUM  AND WIXELA SENT IN TO [facility]  PLEASE ADVISE. THANK YOU  [phone] IS THE BEST CONTACT NUMBER IF NEEDED

## 2025-07-17 ENCOUNTER — OFFICE VISIT (OUTPATIENT)
Dept: PODIATRY | Facility: CLINIC | Age: 80
End: 2025-07-17
Payer: MEDICARE

## 2025-07-17 VITALS
HEART RATE: 63 BPM | HEIGHT: 71 IN | SYSTOLIC BLOOD PRESSURE: 156 MMHG | BODY MASS INDEX: 35.7 KG/M2 | WEIGHT: 255 LBS | DIASTOLIC BLOOD PRESSURE: 89 MMHG

## 2025-07-17 DIAGNOSIS — M79.671 FOOT PAIN, BILATERAL: ICD-10-CM

## 2025-07-17 DIAGNOSIS — M79.672 FOOT PAIN, BILATERAL: ICD-10-CM

## 2025-07-17 DIAGNOSIS — B35.1 ONYCHOMYCOSIS: ICD-10-CM

## 2025-07-17 DIAGNOSIS — G62.9 NEUROPATHY: ICD-10-CM

## 2025-07-17 DIAGNOSIS — E11.9 NON-INSULIN DEPENDENT TYPE 2 DIABETES MELLITUS: ICD-10-CM

## 2025-07-17 DIAGNOSIS — E11.8 DM FEET: ICD-10-CM

## 2025-07-17 DIAGNOSIS — L60.0 ONYCHOCRYPTOSIS: Primary | ICD-10-CM

## 2025-07-17 NOTE — PROGRESS NOTES
[facility] - PODIATRY    Today's Date: 25    Patient Name: Abdi Gomez  MRN: [id number]  CSN: [id number]  PCP: Jaimie Stallworth APRN, Last PCP Visit: 2025  Referring Provider: No ref. provider found    SUBJECTIVE     Chief Complaint   Patient presents with    Left Foot - Follow-up, Nail Problem    Right Foot - Follow-up, Nail Problem     HPI: Abdi Gomez, a 80 y.o.male, presents to clinic for painful toenail:    New, Established, New Problem:  est  Location:  Toenails  Duration:   Greater than five years  Onset:  Gradual  Nature:  sore with palpation.  Stable, worsening, improving:   stable  Aggravating factors:  Pain with shoe gear and ambulation.  Previous Treatment: debridement    Patient controlling diabetes via: Oral medication    Medical changes:  none    Patient states their most recent blood glucose readin    Patient states their most recent HgB A1C was 5.7.    Patient denies any fevers, chills, nausea, vomiting, shortness of breath, nor any other constitutional signs nor symptoms.    The patient relates numbness in his first toes bilaterally.    I have reviewed/confirmed previously documented HPI with no changes.     Past Medical History:   Diagnosis Date    Allergic rhinitis     Asthma     Chronic idiopathic fibrosing alveolitis     COPD (chronic obstructive pulmonary disease)     Coronary artery disease     Coronary artery disease involving native coronary artery of native heart without angina pectoris 2021     Status post angioplasty and stent placement in  (details not available)    Diabetes mellitus 2011    Difficulty walking     GERD (gastroesophageal reflux disease)     Gout     HL (hearing loss)     Hyperlipidemia     Hypertension 1996    Pneumonia 2019    Primary central sleep apnea     Sleep apnea     Sleep apnea, obstructive      Past Surgical History:   Procedure Laterality Date    CARDIAC CATHETERIZATION      CORONARY  STENT PLACEMENT      EYE SURGERY      SINUS SURGERY      TONSILLECTOMY       Family History   Problem Relation Age of Onset    Heart failure Mother     Asthma Mother     Cancer Father         Passed with lung cancer     Social History     Socioeconomic History    Marital status:    Tobacco Use    Smoking status: Former     Current packs/day: 0.00     Average packs/day: 1.5 packs/day for 40.0 years (60.0 ttl pk-yrs)     Types: Cigarettes     Start date: 1963     Quit date: 1999     Years since quittin.5     Passive exposure: Past    Smokeless tobacco: Never   Vaping Use    Vaping status: Never Used   Substance and Sexual Activity    Alcohol use: Never    Drug use: Never    Sexual activity: Not Currently     Partners: Female     Allergies   Allergen Reactions    Atorvastatin Other (See Comments)    Hydrocodone Irritability    Keflex [Cephalexin] Rash     Current Outpatient Medications   Medication Sig Dispense Refill    allopurinol (ZYLOPRIM) 100 MG tablet Take 1 tablet by mouth Daily.      aspirin 81 MG chewable tablet Chew 1 tablet Daily.      cetirizine (zyrTEC) 10 MG tablet Take 1 tablet by mouth Daily. 90 tablet 3    dilTIAZem CD (CARDIZEM CD) 120 MG 24 hr capsule Take 1 capsule by mouth Daily. 90 capsule 3    ezetimibe (ZETIA) 10 MG tablet Take 1 tablet by mouth Daily.      famotidine (PEPCID) 20 MG tablet Take 1 tablet by mouth 2 (Two) Times a Day.      fluticasone (FLONASE) 50 MCG/ACT nasal spray Administer 2 sprays into the nostril(s) as directed by provider Daily. 16 g 10    Fluticasone-Salmeterol (Wixela Inhub) 250-50 MCG/ACT DISKUS Inhale 1 puff 2 (Two) Times a Day for 90 days. 3 each 1    glipizide (GLUCOTROL XL) 2.5 MG 24 hr tablet Take 1 tablet by mouth 2 (Two) Times a Day.      hydroCHLOROthiazide (MICROZIDE) 12.5 MG capsule Take 1 capsule by mouth Every Morning.      hydroxychloroquine (PLAQUENIL) 200 MG tablet Take 1 tablet by mouth 2 (Two) Times a Day.      losartan (Cozaar) 100  "MG tablet Take 1 tablet by mouth Daily. 90 tablet 3    Misc Natural Products (BLACK CHERRY CONCENTRATE PO) Take  by mouth Daily.      montelukast (SINGULAIR) 10 MG tablet Take 1 tablet by mouth Every Night. 90 tablet 3    Nintedanib Esylate (Ofev) 150 MG capsule Take 150 mg by mouth 2 (Two) Times a Day. 60 capsule 11    Omega-3 Fatty Acids (fish oil) 1200 MG capsule capsule Take 1 capsule by mouth Daily.      Red Yeast Rice Extract 600 MG capsule Take  by mouth Daily.      tiotropium bromide monohydrate (Spiriva Respimat) 2.5 MCG/ACT aerosol solution inhaler Inhale 2 puffs Daily for 90 days. 3 each 1    vitamin B-12 (CYANOCOBALAMIN) 1000 MCG tablet Take 1 tablet by mouth Daily.      VITAMIN D, CHOLECALCIFEROL, PO Take  by mouth Daily.      ipratropium-albuterol (DUO-NEB) 0.5-2.5 mg/3 ml nebulizer Take 3 mL by nebulization 4 (Four) Times a Day As Needed for Wheezing for up to 90 days. 360 mL 3     No current facility-administered medications for this visit.     Review of Systems   Constitutional: Negative.    Skin:         Painful toenails   Neurological:  Positive for numbness.   All other systems reviewed and are negative.      OBJECTIVE     Vitals:    07/17/25 1046   BP: 156/89   Pulse: 63       Body mass index is 35.57 kg/m².    No results found for: \"HGBA1C\"    Lab Results   Component Value Date    GLUCOSE 125 (H) 08/26/2024    CALCIUM 10.0 08/26/2024     08/26/2024    K 4.8 08/26/2024    CO2 27.5 08/26/2024     08/26/2024    BUN 12 08/26/2024    CREATININE 1.37 (H) 08/26/2024    EGFRIFNONA 60 (L) 09/07/2021    BCR 8.8 08/26/2024    ANIONGAP 10.5 08/26/2024       Patient seen in no apparent distress.      PHYSICAL EXAM:     Foot/Ankle Exam    GENERAL  Diabetic foot exam performed    Appearance:  obese and elderly  Orientation:  AAOx3  Affect:  appropriate  Gait:  unimpaired  Assistance:  independent  Right shoe gear: casual shoe  Left shoe gear: casual shoe    VASCULAR     Right Foot Vascularity "   Dorsalis pedis:  2+  Posterior tibial:  2+  Skin temperature:  warm  Edema gradin+ and non-pitting  CFT:  < 3 seconds  Pedal hair growth:  Absent  Varicosities:  mild varicosities     Left Foot Vascularity   Dorsalis pedis:  2+  Posterior tibial:  2+  Skin temperature:  warm  Edema gradin+ and non-pitting  CFT:  < 3 seconds  Pedal hair growth:  Absent  Varicosities:  mild varicosities     NEUROLOGIC     Right Foot Neurologic   Light touch sensation: diminished  Vibratory sensation: diminished  Hot/Cold sensation: diminished  Protective Sensation using [place] Monofilament:   Sites intact: 2  Sites tested: 10     Left Foot Neurologic   Light touch sensation: diminished  Vibratory sensation: diminished  Hot/Cold sensation:  diminished  Protective Sensation using [place] Monofilament:   Sites intact: 2  Sites tested: 10    MUSCLE STRENGTH     Right Foot Muscle Strength   Foot dorsiflexion:  4  Foot plantar flexion:  4  Foot inversion:  4  Foot eversion:  4     Left Foot Muscle Strength   Foot dorsiflexion:  4  Foot plantar flexion:  4  Foot inversion:  4  Foot eversion:  4    RANGE OF MOTION     Right Foot Range of Motion   Foot and ankle ROM within normal limits       Left Foot Range of Motion   Foot and ankle ROM within normal limits      DERMATOLOGIC      Right Foot Dermatologic   Skin  Right foot skin is intact.   Nails  1.  Positive for elongated, onychomycosis, abnormal thickness, subungual debris and ingrown toenail.  2.  Positive for elongated, onychomycosis, abnormal thickness, subungual debris and ingrown toenail.     Left Foot Dermatologic   Skin  Left foot skin is intact.   Nails  1.  Positive for elongated, onychomycosis, abnormal thickness, subungual debris and ingrown toenail.  2.  Positive for elongated, onychomycosis, abnormal thickness, subungual debris and ingrown toenail.  3.  Positive for elongated, onychomycosis, abnormal thickness, subungual debris and ingrown  toenail.  4.  Positive for elongated, onychomycosis, abnormally thick, subungual debris and ingrown toenail.    I have reexamined the patient the results are consistent with the previously documented exam.      ASSESSMENT/PLAN     Diagnoses and all orders for this visit:    1. Onychocryptosis (Primary)    2. Onychomycosis    3. DM feet    4. Non-insulin dependent type 2 diabetes mellitus    5. Foot pain, bilateral    6. Neuropathy    Comprehensive lower extremity examination and evaluation was performed.    Discussed findings and treatment plan including risks, benefits, and treatment options with patient in detail. Patient agreed with treatment plan.    Medications and allergies reviewed.  Reviewed available blood glucose and HgB A1C lab values along with other pertinent labs.  These were discussed with the patient as to their importance of diabetic maintenance.    Toenails 1, 2 on Right and 1, 2, 3, 4 on Left were debrided with nail nippers then filed with a D[place]el nail doris.  Patient tolerated procedure well without complications.    An After Visit Summary was printed and given to the patient at discharge, including (if requested) any available informative/educational handouts regarding diagnosis, treatment, or medications. All questions were answered to patient/family satisfaction. Should symptoms fail to improve or worsen they agree to call or return to clinic or to go to the Emergency Department. Discussed the importance of following up with any needed screening tests/labs/specialist appointments and any requested follow-up recommended by me today. Importance of maintaining follow-up discussed and patient accepts that missed appointments can delay diagnosis and potentially lead to worsening of conditions.    Return for Toenail Care., or sooner if acute issues arise.    I have reviewed the assessment and plan and verified the accuracy of it. No changes to assessment and plan since the information was  documented. Richie Watson DPM 07/17/25     I have dictated this note utilizing Dragon Dictation.  Please note that portions of this note were completed with a voice recognition program.  Part of this note may be an electronic transcription/translation of spoken language to printed text using the Dragon Dictation System.      This document has been electronically signed by Richie Watson DPM on July 17, 2025 11:02 EDT

## 2025-08-04 ENCOUNTER — OFFICE VISIT (OUTPATIENT)
Dept: PULMONOLOGY | Facility: CLINIC | Age: 80
End: 2025-08-04
Payer: MEDICARE

## 2025-08-04 ENCOUNTER — TELEPHONE (OUTPATIENT)
Dept: PULMONOLOGY | Facility: CLINIC | Age: 80
End: 2025-08-04

## 2025-08-04 VITALS
WEIGHT: 258 LBS | TEMPERATURE: 96 F | HEIGHT: 71 IN | HEART RATE: 96 BPM | RESPIRATION RATE: 20 BRPM | DIASTOLIC BLOOD PRESSURE: 74 MMHG | OXYGEN SATURATION: 95 % | BODY MASS INDEX: 36.12 KG/M2 | SYSTOLIC BLOOD PRESSURE: 144 MMHG

## 2025-08-04 DIAGNOSIS — R06.09 CHRONIC DYSPNEA: ICD-10-CM

## 2025-08-04 DIAGNOSIS — J43.9 PULMONARY EMPHYSEMA, UNSPECIFIED EMPHYSEMA TYPE: ICD-10-CM

## 2025-08-04 DIAGNOSIS — G47.33 OSA (OBSTRUCTIVE SLEEP APNEA): ICD-10-CM

## 2025-08-04 DIAGNOSIS — J84.10 PULMONARY FIBROSIS: ICD-10-CM

## 2025-08-04 DIAGNOSIS — I25.10 CORONARY ARTERY DISEASE INVOLVING NATIVE CORONARY ARTERY OF NATIVE HEART WITHOUT ANGINA PECTORIS: Chronic | ICD-10-CM

## 2025-08-04 DIAGNOSIS — F17.201 TOBACCO ABUSE, IN REMISSION: ICD-10-CM

## 2025-08-04 DIAGNOSIS — J30.2 SEASONAL ALLERGIES: Primary | ICD-10-CM

## 2025-08-04 DIAGNOSIS — Z77.098 OCCUPATIONAL EXPOSURE TO CHEMICALS: ICD-10-CM

## 2025-08-04 PROCEDURE — G2211 COMPLEX E/M VISIT ADD ON: HCPCS | Performed by: NURSE PRACTITIONER

## 2025-08-04 PROCEDURE — 3077F SYST BP >= 140 MM HG: CPT | Performed by: NURSE PRACTITIONER

## 2025-08-04 PROCEDURE — 1160F RVW MEDS BY RX/DR IN RCRD: CPT | Performed by: NURSE PRACTITIONER

## 2025-08-04 PROCEDURE — 1159F MED LIST DOCD IN RCRD: CPT | Performed by: NURSE PRACTITIONER

## 2025-08-04 PROCEDURE — 3078F DIAST BP <80 MM HG: CPT | Performed by: NURSE PRACTITIONER

## 2025-08-04 PROCEDURE — 99214 OFFICE O/P EST MOD 30 MIN: CPT | Performed by: NURSE PRACTITIONER

## 2025-08-04 RX ORDER — ALBUTEROL SULFATE 90 UG/1
2 INHALANT RESPIRATORY (INHALATION) EVERY 4 HOURS PRN
COMMUNITY

## 2025-08-05 ENCOUNTER — OFFICE VISIT (OUTPATIENT)
Dept: CARDIOLOGY | Facility: CLINIC | Age: 80
End: 2025-08-05
Payer: MEDICARE

## 2025-08-05 VITALS
DIASTOLIC BLOOD PRESSURE: 75 MMHG | SYSTOLIC BLOOD PRESSURE: 124 MMHG | HEART RATE: 97 BPM | WEIGHT: 256 LBS | BODY MASS INDEX: 35.84 KG/M2 | HEIGHT: 71 IN

## 2025-08-05 DIAGNOSIS — I25.10 CORONARY ARTERY DISEASE INVOLVING NATIVE CORONARY ARTERY OF NATIVE HEART WITHOUT ANGINA PECTORIS: Primary | ICD-10-CM

## 2025-08-05 DIAGNOSIS — E78.2 MIXED HYPERLIPIDEMIA: ICD-10-CM

## 2025-08-05 DIAGNOSIS — I10 ESSENTIAL HYPERTENSION: ICD-10-CM

## 2025-08-05 PROCEDURE — 3078F DIAST BP <80 MM HG: CPT | Performed by: FAMILY MEDICINE

## 2025-08-05 PROCEDURE — 3074F SYST BP LT 130 MM HG: CPT | Performed by: FAMILY MEDICINE

## 2025-08-05 PROCEDURE — 99214 OFFICE O/P EST MOD 30 MIN: CPT | Performed by: FAMILY MEDICINE

## 2025-08-20 ENCOUNTER — TRANSCRIBE ORDERS (OUTPATIENT)
Dept: ADMINISTRATIVE | Facility: HOSPITAL | Age: 80
End: 2025-08-20
Payer: MEDICARE

## 2025-08-20 DIAGNOSIS — N18.31 STAGE 3A CHRONIC KIDNEY DISEASE: Primary | ICD-10-CM
